# Patient Record
Sex: FEMALE | Race: WHITE | NOT HISPANIC OR LATINO | Employment: FULL TIME | URBAN - METROPOLITAN AREA
[De-identification: names, ages, dates, MRNs, and addresses within clinical notes are randomized per-mention and may not be internally consistent; named-entity substitution may affect disease eponyms.]

---

## 2017-03-02 ENCOUNTER — ALLSCRIPTS OFFICE VISIT (OUTPATIENT)
Dept: OTHER | Facility: OTHER | Age: 62
End: 2017-03-02

## 2018-01-15 VITALS
RESPIRATION RATE: 18 BRPM | HEIGHT: 64 IN | OXYGEN SATURATION: 96 % | SYSTOLIC BLOOD PRESSURE: 170 MMHG | WEIGHT: 287.25 LBS | BODY MASS INDEX: 49.04 KG/M2 | HEART RATE: 121 BPM | DIASTOLIC BLOOD PRESSURE: 90 MMHG

## 2018-03-12 ENCOUNTER — OFFICE VISIT (OUTPATIENT)
Dept: BARIATRICS | Facility: CLINIC | Age: 63
End: 2018-03-12

## 2018-03-12 VITALS
DIASTOLIC BLOOD PRESSURE: 82 MMHG | RESPIRATION RATE: 20 BRPM | WEIGHT: 291 LBS | HEART RATE: 82 BPM | SYSTOLIC BLOOD PRESSURE: 128 MMHG | TEMPERATURE: 98 F | HEIGHT: 64 IN | BODY MASS INDEX: 49.68 KG/M2

## 2018-03-12 DIAGNOSIS — E66.01 MORBID (SEVERE) OBESITY DUE TO EXCESS CALORIES (HCC): Primary | ICD-10-CM

## 2018-03-12 DIAGNOSIS — E66.01 MORBID OBESITY (HCC): Primary | ICD-10-CM

## 2018-03-12 PROCEDURE — RECHECK

## 2018-03-12 RX ORDER — LOSARTAN POTASSIUM 100 MG/1
1 TABLET ORAL DAILY
COMMUNITY
Start: 2014-01-20 | End: 2018-07-16 | Stop reason: SDUPTHER

## 2018-03-12 RX ORDER — BENZONATATE 200 MG/1
1 CAPSULE ORAL 3 TIMES DAILY PRN
COMMUNITY
Start: 2017-03-06 | End: 2018-04-27 | Stop reason: ALTCHOICE

## 2018-03-12 RX ORDER — LEVOCETIRIZINE DIHYDROCHLORIDE 5 MG/1
1 TABLET, FILM COATED ORAL DAILY
COMMUNITY
Start: 2014-01-20 | End: 2018-04-27 | Stop reason: SDUPTHER

## 2018-03-12 RX ORDER — ASPIRIN 81 MG/1
81 TABLET, CHEWABLE ORAL DAILY
COMMUNITY
End: 2018-08-09

## 2018-03-12 RX ORDER — IPRATROPIUM BROMIDE AND ALBUTEROL SULFATE 2.5; .5 MG/3ML; MG/3ML
3 SOLUTION RESPIRATORY (INHALATION) EVERY 6 HOURS PRN
COMMUNITY
Start: 2015-04-02

## 2018-03-12 RX ORDER — MELOXICAM 15 MG/1
1 TABLET ORAL DAILY
COMMUNITY
Start: 2013-11-20 | End: 2018-04-09 | Stop reason: ALTCHOICE

## 2018-03-12 RX ORDER — ALBUTEROL SULFATE 90 UG/1
2 AEROSOL, METERED RESPIRATORY (INHALATION) EVERY 6 HOURS PRN
COMMUNITY
Start: 2014-03-10

## 2018-03-12 RX ORDER — LEVOFLOXACIN 750 MG/1
1 TABLET ORAL DAILY
COMMUNITY
Start: 2016-10-24 | End: 2018-04-09 | Stop reason: ALTCHOICE

## 2018-03-12 RX ORDER — LORAZEPAM 0.5 MG/1
TABLET ORAL AS NEEDED
COMMUNITY
Start: 2014-04-08 | End: 2018-07-31 | Stop reason: SDUPTHER

## 2018-03-12 RX ORDER — HYDROCODONE POLISTIREX AND CHLORPHENIRAMINE POLISTIREX 10; 8 MG/5ML; MG/5ML
5 SUSPENSION, EXTENDED RELEASE ORAL
COMMUNITY
Start: 2016-10-24 | End: 2018-04-09 | Stop reason: ALTCHOICE

## 2018-03-12 NOTE — PROGRESS NOTES
Bariatric Behavioral Health Evaluation    Presenting Problem:  Bruno Engel, :  1955    Patient here to increase health, increase mobility, reduce chronic pain    Is the patient seeking Bariatric Surgery Eval? Yes  If yes how long have you researched this surgery option  Patient has been researching bariatric surgery for 2 years    Realizes Post- Op Requirements? Yes Patient has numerous family members with bariatric surgery    Pre-morbid level of function and history of present illness:  History of chronic pain    Psychiatric/Psychological Treatment Diagnosis: Denies [place] I diagnosis    Outpatient Counselor No    Psychiatrist No     Have you had Inpatient Treatment?  No    Family Constellation (include relationship with each and Psych/Med HX)    Father  tobacco use and Siblings  obesity    Domestic Violence No     Patient denies childhood trauma    Additional comments/stressors related to family/relationships/peer support:  Health, weight, chronic pain    Physical/Psychological Assessment:     Appearance: appropriate  Sociability: average  Affect: appropriate  Mood: calm  Thought Process: coherent  Speech: normal  Content: no impairment  Orientation: person  Yes , place  Yes , time  Yes , normal attention span  Yes , normal memory  Yes   and normal judgement  Yes   Insight: emotional  good    Risk Assessment:     none    Recommendations: Recommended for surgery  yes    Risk of Harm to Self or Others:   Patient denies suicidal and homicidal ideation    Observation:     Interviews this interview    Access to weapons no     Based on the previous information, the client presents the following risk of harm to self or others: low      BARIATRIC SURGERY EDUCATION CHECKLIST    I have received education related to my bariatric surgery process and understand:    Patients may be required to complete a psychiatric evaluation and receive clearance for surgery from their psychiatrist     Patients who undergo weight loss surgery are at higher risk of increased mental health concerns and suicide attempts  Patients may be required to complete a full substance abuse evaluation and then complete all treatment recommendations prior to surgery  If diagnosis of abuse/dependence results, patient may be required to remain sober for one (1) year before having bariatric surgery  Patients on psychiatric medications should check with their provider to discuss psychiatric medications and the changes in absorption  Patient should discuss all time release medications with provider and take all medications as prescribed  The recommendation is that there is no use of  any tobacco products, Hookah or  vapes for the bariatric post-operation patient  Bariatric surgery patients should not consume alcohol as a post-operative patient as it may increase risk of numerous health conditions including but not limited to alcohol abuse and ulcers  There is a possibility of weight regain if patient does not follow all program guidelines and recommendations  Bariatric surgery patients should exercise thirty (30) to sixty (60) minutes per day to maintain post-surgical weight loss  Research indicates that bariatric patients are more successful when they see a therapist for up to two (2) years post-op  Patients will follow all medical and dietary recommendations provided  Patient will keep all scheduled appointments and follow up with their physician for a minimum of five (5) years  Patient will take all vitamins as recommended  Post-operative vitamins are life-long  Patient reviewed Bariatric Surgery Education Checklist and agrees they have received education on these issues   Note:  Patient denies Ax[place] I diagnosis or treatment  Patient educated regarding the impact of nicotine and alcohol on the post bariatric surgery patient    Patient meets criteria for surgery at this program and is referred to physician

## 2018-03-12 NOTE — PROGRESS NOTES
Bariatric Nutrition Assessment Note    Type of surgery    Gastric bypass: laparoscopic  Surgery Date: TDB  Surgeon: Dr Jose Luis Arroyo  65 y o   female     Wt with BMI of 25: 143lbs  Pre-Op Excess Wt: 148  Height 5' 3 5" (1 613 m), weight 132 kg (291 lb)  Weight History   Onset of Obesity: Childhood, teenage years  Family history of obesity: Yes, sister had lap band, now going for bariatric sx  Wt Loss Attempts: Commercial Programs ([employer], Mayela Katt, etc )  Exercise  FAD Diets (Cabbage soup, Grapefruit, Cleanse, etc )  Fasting  High Protein/Low CHO diets (Atkins, Union, etc )  Self Created Diets (Portion Control, Healthy Food Choices, etc )  Maximum Wt Lost: 55lbs w/ weight watchers    Review of History and Medications   No past medical history on file  No past surgical history on file  Social History     Social History    Marital status: /Civil Union     Spouse name: N/A    Number of children: N/A    Years of education: N/A     Social History Main Topics    Smoking status: Not on file    Smokeless tobacco: Not on file    Alcohol use Not on file    Drug use: Unknown    Sexual activity: Not on file     Other Topics Concern    Not on file     Social History Narrative    No narrative on file     No current outpatient prescriptions on file  Food Intake and Lifestyle Assessment   Food Intake Assessment completed via usual diet recall  Breakfast: coffee w/lf milk and belvita pack  Sometimes oatmeal  Snack: grazes   Lunch: sometimes will bring a yoselyn, chicken salad or turkey and cheese sandwich  Or will get a salad from [facility] with tuna, cucumbers and oil and vinegar    Snack: grazes  Dinner: usually meat, veggie and carb, but sometimes orders out pizza or [employer]  Snack: popcorn  Beverage intake: water and coffee/tea  Protein supplement: n/a  Estimated protein intake per day: 60gm  Estimated fluid intake per day: 48-64oz  Meals eaten away from home: 1-2 per week  Typical meal pattern: 3 meals per day and 0-3 snacks per day  Eating Behaviors: Consumption of high calorie/ high fat foods, Large portion sizes, Frequent snacking/ grazing, Mindless eating, Emotional eating and Craves salty foods  Food allergies or intolerances: Allergies not on file  Cultural or Mandaeism considerations: n/a    Physical Assessment  Physical Activity  Types of exercise: None, had bad arthritis in knees, therefore has difficulty walking  Discussed trying the recumbent bike which she has done in the past  Current physical limitations: bad arthritis in both knees    Psychosocial Assessment   Support systems: spouse spouse and children sibling(s) children  Socioeconomic factors: n/a    Nutrition Diagnosis  Diagnosis: Overweight / Obesity (NC-3 3), Excessive energy intake (NI-1 5), Inappropriate intake of carbohydrates (NI-5 8 3) and Inadequate protein intake (NI-5 7  3)  Related to: Physical inactivity and Excessive energy intake  As Evidenced by: BMI >25 and Excessive energy intake     Nutrition Prescription: Recommend the following diet  High protein and Regular    Interventions and Teaching   Discussed pre-op and post-op nutrition guidelines  Patient educated and handouts provided    Surgical changes to stomach / GI  Capacity of post-surgery stomach  Adequate hydration  Sugar and fat restriction to decrease "dumping syndrome"  Expected weight loss  Exercise  Suggestions for pre-op diet  Nutrition considerations after surgery  Protein supplements  Meal planning and preparation  Appropriate carbohydrate, protein, and fat intake, and food/fluid choices to maximize safe weight loss, nutrient intake, and tolerance   Dietary and lifestyle changes  Possible problems with poor eating habits  Techniques for self monitoring and keeping daily food journal  Vitamin / Mineral supplementation 2000IU Vitamin D at this time, advised to add MVI    Education provided to: patient    Barriers to learning: No barriers identified  Readiness to change: preparation    Prior research on procedure: books, internet, discussed with provider and info session    Comprehension: verbalizes understanding     Expected Compliance: excellent  Recommendations  Pt is an appropriate candidate for surgery  Yes    Discussed with pt the need to lose 5% of current weight before surgery (14 5lbs)  Reviewed changes to make at this time to assist in weight loss ie:  Increase intake of veggies and protein, decrease intake of carbs and fat  Start back with exercise on the recumbent bike    Evaluation / Monitoring  Dietitian to Monitor: Eating pattern as discussed Body weight Physical activity    Goals  Food journal, Exercise 15 minutes *(to start due to knees) 5 times per week, Complete lession plans 1-6, Eat 3 meals per day and Eliminate mindless snacking    Time Spent:   1 Hour

## 2018-04-05 PROBLEM — F41.8 DEPRESSION WITH ANXIETY: Status: ACTIVE | Noted: 2018-04-05

## 2018-04-05 PROBLEM — E66.01 MORBID OBESITY WITH BODY MASS INDEX (BMI) OF 50.0 TO 59.9 IN ADULT (HCC): Status: ACTIVE | Noted: 2018-04-05

## 2018-04-05 PROBLEM — I10 BENIGN ESSENTIAL HTN: Status: ACTIVE | Noted: 2018-04-05

## 2018-04-05 NOTE — ASSESSMENT & PLAN NOTE
Interested in [address] with Dr Shyanne Clemons    10% Weight loss-Not Applicable  Screening labs-Not Completed  Support Group-Not Completed  Cardiac Risk Assessment-Not Completed  Upper Endoscopy-Not Completed  Sleep Medicine Assessment-Negative STOP  BANG  Alcohol and nicotine use is not recommended following bariatric surgery  NSAIDs should be avoided following bariatric surgery  Advised that pregnancy should be avoided following bariatric surgery for 12-18 months and should not solely rely on OCP and consider additional/alternative sources for contraception due to potential absorption issues-post menopausal

## 2018-04-05 NOTE — PROGRESS NOTES
Assessment/Plan: Morbid obesity with body mass index (BMI) of 50 0 to 59 9 in [facility])  Interested in Margarita-En-Y Gastric Bypass with Dr Giacomo Maloney  10% Weight loss-Not Applicable  Screening labs-Not Completed  Support Group-Not Completed  Cardiac Risk Assessment-Not Completed  Upper Endoscopy-Not Completed  Sleep Medicine Assessment-Negative STOP  BANG  Alcohol and nicotine use is not recommended following bariatric surgery  NSAIDs should be avoided following bariatric surgery  Advised that pregnancy should be avoided following bariatric surgery for 12-18 months and should not solely rely on OCP and consider additional/alternative sources for contraception due to potential absorption issues-post menopausal    Benign essential HTN  -stable  -taking losartan    Depression with anxiety  -on ativan prn   -recommend f/u with PCP  -no longer taking sertraline    Follow up in approximately 1 month with Non-Surgical Physician/Advanced Practitioner  Goals:  Food log (ie ) www myfitnesspal com,sparkpeople  com,loseit com,calorieking  com,etc    No sugary beverages  At least 64oz of water daily  Increase physical activity by 10 minutes daily Gradually increase physical activity to a goal of 5 days per week for 30 minutes of MODERATE intensity PLUS 2 days per week of FULL BODY resistance training  Practice lesson plans 1-6 in bariatric manual   Practice 30/60 rule  Please schedule cardiology risk assessment  Please attend support group  Please have labs drawn    Diagnoses and all orders for this visit:    Morbid obesity with body mass index (BMI) of 50 0 to 59 9 in [facility])  -     Comprehensive metabolic panel; Future  -     TSH, 3rd generation with T4 reflex; Future  -     Lipid panel; Future  -     CBC and Platelet; Future    Benign essential HTN  -     Comprehensive metabolic panel; Future  -     TSH, 3rd generation with T4 reflex; Future  -     Lipid panel; Future  -     CBC and Platelet;  Future    Depression with anxiety    Abnormal weight gain  -     Comprehensive metabolic panel; Future  -     TSH, 3rd generation with T4 reflex; Future  -     Lipid panel; Future  -     CBC and Platelet; Future    Preoperative clearance  -     Comprehensive metabolic panel; Future  -     TSH, 3rd generation with T4 reflex; Future  -     Lipid panel; Future  -     CBC and Platelet; Future    Other orders  -     Multiple Vitamins-Minerals (WOMENS MULTIVITAMIN PO); Take by mouth daily at bedtime    Subjective:   Chief Complaint   Patient presents with    Consult     Pt is here for 1/3 0667 [facility] weight consult     Patient ID: Faraz Palomares  is a 65 y o  female with excess weight/obesity here to pursue weight managment  Past Medical History:   Diagnosis Date    Anxiety     Asthma     Depression     Hypertension     Lumbar disc disease     Morbid obesity ([address] )     Osteoarthritis      HPI:  Obesity/Excess Weight:  Severity: Severe  Onset:  Most of her life    Modifiers: Commercial Weight Loss Programs-ie  Weight Watchers, Micah Sara, Nutrisystem, etc   Contributing factors: Pregnancy and falls/knee injury, poor eating habits  Associated symptoms: increased joint pain, increased shortness of breath and inability to do certain activities    The following portions of the patient's history were reviewed and updated as appropriate: allergies, current medications, past family history, past medical history, past social history, past surgical history and problem list     Review of Systems   Constitutional: Negative for chills and fever  HENT: Negative for sore throat  Respiratory: Positive for shortness of breath (associated with asthma, usually occurs with URI's)  Negative for cough  Cardiovascular: Negative for chest pain and palpitations  Gastrointestinal: Negative for abdominal pain, constipation, diarrhea, nausea and vomiting  Denies GERD   Genitourinary: Negative for dysuria  Musculoskeletal: Positive for arthralgias  Skin: Negative for rash  Neurological: Negative for headaches  Psychiatric/Behavioral:        Recently discontinued anti-depressant  Denies sx of depression  Recommend f/u with PCP     Objective:    /78 (BP Location: Left arm, Patient Position: Sitting, Cuff Size: Extra-Large)   Pulse 100   Temp 98 5 °F (36 9 °C) (Tympanic)   Resp 16   Ht 5' 3 5" (1 613 m)   Wt 130 kg (287 lb)   BMI 50 04 kg/m²     Physical Exam   Nursing note and vitals reviewed  Constitutional   General appearance: Abnormal   well developed and morbidly obese  Eyes No conjunctival pallor  Ears, Nose, Mouth, and Throat Oral mucosa moist    Pulmonary   Respiratory effort: No increased work of breathing or signs of respiratory distress  Auscultation of lungs: Clear to auscultation, equal breath sounds bilaterally, no wheezes, no rales, no rhonci  Cardiovascular   Auscultation of heart: Normal rate and rhythm, normal S1 and S2, without murmurs  Examination of extremities for edema and/or varicosities: Normal   no edema  Abdomen   Abdomen: Abnormal   The abdomen was obese  Bowel sounds were normal  The abdomen was soft and nontender     Musculoskeletal   Gait and station: ambulates with cane  Psychiatric   Orientation to person, place and time: Normal     Affect: appropriate

## 2018-04-09 ENCOUNTER — OFFICE VISIT (OUTPATIENT)
Dept: BARIATRICS | Facility: CLINIC | Age: 63
End: 2018-04-09
Payer: COMMERCIAL

## 2018-04-09 VITALS
SYSTOLIC BLOOD PRESSURE: 120 MMHG | HEIGHT: 64 IN | BODY MASS INDEX: 49 KG/M2 | RESPIRATION RATE: 16 BRPM | TEMPERATURE: 98.5 F | DIASTOLIC BLOOD PRESSURE: 78 MMHG | WEIGHT: 287 LBS | HEART RATE: 100 BPM

## 2018-04-09 DIAGNOSIS — F41.8 DEPRESSION WITH ANXIETY: ICD-10-CM

## 2018-04-09 DIAGNOSIS — Z01.818 PREOPERATIVE CLEARANCE: ICD-10-CM

## 2018-04-09 DIAGNOSIS — E66.01 MORBID OBESITY WITH BODY MASS INDEX (BMI) OF 50.0 TO 59.9 IN ADULT (HCC): Primary | ICD-10-CM

## 2018-04-09 DIAGNOSIS — I10 BENIGN ESSENTIAL HTN: ICD-10-CM

## 2018-04-09 DIAGNOSIS — R63.5 ABNORMAL WEIGHT GAIN: ICD-10-CM

## 2018-04-09 PROCEDURE — 99204 OFFICE O/P NEW MOD 45 MIN: CPT | Performed by: PHYSICIAN ASSISTANT

## 2018-04-09 NOTE — PATIENT INSTRUCTIONS
Goals: Food log (ie ) www myfitnesspal com,sparkpeople  com,loseit com,calorieking  com,etc    No sugary beverages  At least 64oz of water daily    Increase physical activity by 10 minutes daily Gradually increase physical activity to a goal of 5 days per week for 30 minutes of MODERATE intensity PLUS 2 days per week of FULL BODY resistance training  Practice lesson plans 1-6 in bariatric manual   Practice 30/60 rule  Please schedule cardiology risk assessment  Please attend support group  Please have labs drawn

## 2018-04-10 ENCOUNTER — TELEPHONE (OUTPATIENT)
Dept: FAMILY MEDICINE CLINIC | Facility: CLINIC | Age: 63
End: 2018-04-10

## 2018-04-27 ENCOUNTER — OFFICE VISIT (OUTPATIENT)
Dept: FAMILY MEDICINE CLINIC | Facility: CLINIC | Age: 63
End: 2018-04-27
Payer: COMMERCIAL

## 2018-04-27 VITALS
BODY MASS INDEX: 50.5 KG/M2 | OXYGEN SATURATION: 97 % | WEIGHT: 285 LBS | TEMPERATURE: 98.2 F | SYSTOLIC BLOOD PRESSURE: 160 MMHG | HEART RATE: 93 BPM | HEIGHT: 63 IN | DIASTOLIC BLOOD PRESSURE: 90 MMHG

## 2018-04-27 DIAGNOSIS — E66.01 CLASS 3 SEVERE OBESITY DUE TO EXCESS CALORIES WITHOUT SERIOUS COMORBIDITY WITH BODY MASS INDEX (BMI) OF 40.0 TO 44.9 IN ADULT (HCC): Primary | ICD-10-CM

## 2018-04-27 DIAGNOSIS — T78.40XD ALLERGIC STATE, SUBSEQUENT ENCOUNTER: ICD-10-CM

## 2018-04-27 PROCEDURE — 99213 OFFICE O/P EST LOW 20 MIN: CPT | Performed by: FAMILY MEDICINE

## 2018-04-27 RX ORDER — MULTIVIT-MIN/IRON/FOLIC ACID/K 18-600-40
1 CAPSULE ORAL EVERY EVENING
COMMUNITY

## 2018-04-27 RX ORDER — LEVOCETIRIZINE DIHYDROCHLORIDE 5 MG/1
5 TABLET, FILM COATED ORAL DAILY
Qty: 90 TABLET | Refills: 3 | Status: SHIPPED | OUTPATIENT
Start: 2018-04-27 | End: 2019-07-23 | Stop reason: SDUPTHER

## 2018-05-01 NOTE — PROGRESS NOTES
Assessment/Plan:    No problem-specific Assessment & Plan notes found for this encounter  Diagnoses and all orders for this visit:    Class 3 severe obesity due to excess calories without serious comorbidity with body mass index (BMI) of 40 0 to 44 9 in adult [facility])  -     Ambulatory referral to Bariatric Surgery; Future    Allergic state, subsequent encounter  -     levocetirizine (XYZAL) 5 MG tablet; Take 1 tablet (5 mg total) by mouth daily    Other orders  -     Cholecalciferol (VITAMIN D) 2000 units CAPS; Take 1 capsule by mouth daily        I definitely feel she would benefit from weight loss surgery in terms of her asthma and her arthritic condition of her knees  As well as her hypertension  Subjective:      Patient ID: Eleno Closs is a 65 y o  female  Lucia Tamez is here to discuss her interest in weight loss surgery she already has gone through the education portion of things with Dr Mecca Tony she is unsure if she wants to sleep versus the Margarita-en-Y did have a long discussion regarding my experiences with patients who have had both  She went off her sertraline on her own with weaning and she feels she is doing fine        The following portions of the patient's history were reviewed and updated as appropriate: current medications, past social history, past surgical history and problem list     Review of Systems   Constitutional: Negative  HENT: Negative  Respiratory: Negative  Cardiovascular: Negative  Gastrointestinal: Negative  Objective:      /90   Pulse 93   Temp 98 2 °F (36 8 °C) (Tympanic)   Ht 5' 3" (1 6 m)   Wt 129 kg (285 lb)   SpO2 97%   BMI 50 49 kg/m²          Physical Exam   Constitutional: She appears well-developed  Morbidly obese   Psychiatric: She has a normal mood and affect   Her behavior is normal  Judgment and thought content normal

## 2018-05-02 ENCOUNTER — OFFICE VISIT (OUTPATIENT)
Dept: CARDIOLOGY CLINIC | Facility: CLINIC | Age: 63
End: 2018-05-02
Payer: COMMERCIAL

## 2018-05-02 VITALS
OXYGEN SATURATION: 98 % | WEIGHT: 288 LBS | HEIGHT: 63 IN | BODY MASS INDEX: 51.03 KG/M2 | DIASTOLIC BLOOD PRESSURE: 96 MMHG | HEART RATE: 90 BPM | SYSTOLIC BLOOD PRESSURE: 136 MMHG

## 2018-05-02 DIAGNOSIS — Z82.49 FAMILY HISTORY OF HEART DISEASE: ICD-10-CM

## 2018-05-02 DIAGNOSIS — I10 BENIGN ESSENTIAL HYPERTENSION: ICD-10-CM

## 2018-05-02 DIAGNOSIS — Z01.818 PRE-OP EXAM: Primary | ICD-10-CM

## 2018-05-02 DIAGNOSIS — E66.01 MORBID OBESITY WITH BODY MASS INDEX (BMI) OF 50.0 TO 59.9 IN ADULT (HCC): ICD-10-CM

## 2018-05-02 DIAGNOSIS — R06.00 DYSPNEA ON EXERTION: ICD-10-CM

## 2018-05-02 PROCEDURE — 99244 OFF/OP CNSLTJ NEW/EST MOD 40: CPT | Performed by: INTERNAL MEDICINE

## 2018-05-02 PROCEDURE — 93000 ELECTROCARDIOGRAM COMPLETE: CPT | Performed by: INTERNAL MEDICINE

## 2018-05-02 NOTE — PROGRESS NOTES
Subjective:      Rodolfo Medrano is a 65 y o  female who presents to the office today for a preoperative consultation at the request of surgeon Dr Emmanuel Soliman who plans on performing gastric bypass (VS sleeve) in July  This consultation is requested for the specific conditions prompting preoperative evaluation (i e  because of potential affect on operative risk): poor conditioning, dyspnea, hypertension  Planned anesthesia is general  The patient has no known anesthesia issues and no remote history of abnormal bleeding  She feels dyspnea on exertion and is restricted in activity due to knee pain  She cannot walk 4 blocks or 2 flights of stairs without stopping  The following portions of the patient's history were reviewed and updated as appropriate: allergies, current medications, past family history, past medical history, past social history, past surgical history and problem list     Review of Systems  Pertinent items are noted in HPI  Remainder reviewed and are negative  Objective:      Physical Exam  /96 (BP Location: Left arm, Patient Position: Sitting, Cuff Size: Large)   Pulse 90 Comment: apical  Ht 5' 3" (1 6 m)   Wt 131 kg (288 lb)   SpO2 98%   BMI 51 02 kg/m²   General appearance: alert and oriented, in no acute distress  Head: Normocephalic, without obvious abnormality, atraumatic  Eyes: conjunctivae/corneas clear  PERRL, EOM's intact  Fundi benign  Nose: Nares normal  Septum midline  Mucosa normal  No drainage or sinus tenderness    Neck: no adenopathy, no carotid bruit, no JVD, supple, symmetrical, trachea midline and thyroid not enlarged, symmetric, no tenderness/mass/nodules  Lungs: clear to auscultation bilaterally  Heart: regular rate and rhythm, S1, S2 normal, no murmur, click, rub or gallop  Abdomen: soft, non-tender; bowel sounds normal; no masses,  no organomegaly  Extremities: extremities normal, warm and well-perfused; no cyanosis, clubbing, or edema  Skin: Skin color, texture, turgor normal  No rashes or lesions    Predictors of intubation difficulty:   Morbid obesity? yes    Anatomically abnormal facies? no   Prominent incisors? no   Receding mandible? no   Short, thick neck? yes    Neck range of motion: normal    Cardiographics  ECG: normal sinus rhythm and poor R-wave progression    Lab Review   No visits with results within 2 Month(s) from this visit  Assessment:     1  Pre-op exam - patient's functional capacity is limited due to knee pain  She has poor exercise capacity and develops dyspnea  Will need additional testing prior to surgery to rule out CAD or other cardiac dysfunction  2  Benign essential hypertension - controlled on losartan   3  Family history of heart disease    4  Morbid obesity with body mass index (BMI) of 50 0 to 59 9 in adult ([facility] [address] )    5  Dyspnea on exertion            Plan:      1  Preoperative workup as follows cardiac stress testing to exclude undiagnosed coronary disease (testing will be done with pharmacological agent as she cannot walk on treadmill due to knee pain and limp), echocardiography to exclude impaired ventricular function, hemoglobin, hematocrit, electrolytes, creatinine  2  Change in medication regimen before surgery: none  3  Prophylaxis for cardiac events with perioperative beta-blockers: not indicated  4  Invasive hemodynamic monitoring perioperatively: not indicated    5  Deep vein thrombosis prophylaxis postoperatively:regimen to be chosen by surgical team

## 2018-05-09 ENCOUNTER — TELEPHONE (OUTPATIENT)
Dept: FAMILY MEDICINE CLINIC | Facility: CLINIC | Age: 63
End: 2018-05-09

## 2018-05-10 ENCOUNTER — TELEPHONE (OUTPATIENT)
Dept: FAMILY MEDICINE CLINIC | Facility: CLINIC | Age: 63
End: 2018-05-10

## 2018-05-11 ENCOUNTER — OFFICE VISIT (OUTPATIENT)
Dept: BARIATRICS | Facility: CLINIC | Age: 63
End: 2018-05-11
Payer: COMMERCIAL

## 2018-05-11 VITALS
BODY MASS INDEX: 48.49 KG/M2 | DIASTOLIC BLOOD PRESSURE: 68 MMHG | WEIGHT: 284 LBS | RESPIRATION RATE: 18 BRPM | SYSTOLIC BLOOD PRESSURE: 116 MMHG | HEIGHT: 64 IN | TEMPERATURE: 98 F | HEART RATE: 88 BPM

## 2018-05-11 DIAGNOSIS — E66.9 OBESITY, CLASS I, BMI 30-34.9: Primary | ICD-10-CM

## 2018-05-11 PROBLEM — E66.01 MORBID OBESITY (HCC): Status: ACTIVE | Noted: 2018-05-11

## 2018-05-11 PROCEDURE — 99204 OFFICE O/P NEW MOD 45 MIN: CPT | Performed by: SURGERY

## 2018-05-11 NOTE — PROGRESS NOTES
BARIATRIC CONSULT-INITIAL - BARIATRIC SURGERY  Naz Carter 65 y o  female MRN: [id number]  Unit/Bed#:  Encounter: [id number]      HPI:  Naz Carter is a 65 y o  female who presents with morbid obesity to discuss weight loss options  Review of Systems   Constitutional: Negative  HENT: Negative  Eyes: Negative  Respiratory: Negative  Cardiovascular: Negative  Gastrointestinal: Negative  Endocrine: Negative  Genitourinary: Negative  Musculoskeletal: Negative  Skin: Negative  Neurological: Negative  Hematological: Negative  Psychiatric/Behavioral: Negative  Historical Information   Past Medical History:   Diagnosis Date    Allergic rhinitis     last assessed 14, resolved 12/22/15    Anxiety     Arthritis     resolved 12/22/15    Asthma     Depression     Hypertension     Benign essential     Lumbar disc disease     Morbid obesity ([address] )     Osteoarthritis     Pre-operative laboratory examination      Past Surgical History:   Procedure Laterality Date     SECTION      last assessed 14     Social History   History   Alcohol Use    Yes     Comment: wine - rarely     History   Drug Use No     History   Smoking Status    Never Smoker   Smokeless Tobacco    Never Used     Family History: non-contributory    Meds/Allergies   all medications and allergies reviewed  No Known Allergies    Objective       Current Vitals:   Blood Pressure: 116/68 (18 1418)  Pulse: 88 (18 1418)  Temperature: 98 °F (36 7 °C) (18 1418)  Respirations: 18 (18 1418)  Height: 5' 3 5" (161 3 cm) (18 1418)  Weight - Scale: 129 kg (284 lb) (18 1418)  Body mass index is 49 52 kg/m²  Invasive Devices          No matching active lines, drains, or airways          Physical Exam   Constitutional: She appears well-developed and well-nourished  Lab Results: I have personally reviewed pertinent lab results      Imaging: I have personally reviewed pertinent reports  EKG, Pathology, and Other Studies: I have personally reviewed pertinent reports  Code Status: [unfilled]  Advance Directive and Living Will:      Power of :    POLST:      Assessment/PLAN:            Patient has a long history of morbid obesity and is presenting to discuss the surgical weight loss options  Despite the patient best efforts patient was unable to lose any meaningful or sustainable weight using nonsurgical means  We had a long discussion regarding all the surgical weight-loss options at our disposal at this point and reviewed the risks and benefits of each procedure in details as it relates to her age, BMI and medical conditions  Patient elected to undergo a gastric bypass    Risks and benefits were explained to the patient  We also discussed the importance and need of a preoperative workup to make sure that the patient can undergo the procedure safely  Preoperative workup includes sleep apnea screening, cardiac evaluation, nutrition/psych and preoperative EGD  Risks and benefits of all the preoperative diagnostic tests were discussed with the patient including but not limited to the upper endoscopy  Alternatives to surgery and alternative forms of surgery were also explained  Postsurgical commitment and aftercare programs were discussed and explained to the patient in details   In terms of comorbidities patient suffers mostly of   Past Medical History:   Diagnosis Date    Allergic rhinitis     last assessed 1/20/14, resolved 12/22/15    Anxiety     Arthritis     resolved 12/22/15    Asthma     Depression     Hypertension     Benign essential     Lumbar disc disease     Morbid obesity [facility] )     Osteoarthritis     Pre-operative laboratory examination        I informed the patient that the rate of resolution of comorbid conditions following weight loss surgery is between 60 and 90% depending on the severity of the specific medical condition  I discussed and educated the patient regarding the different components of our multidisciplinary program and the importance of compliance and follow-up in the postoperative period  All questions answered  Patient understands risks and benefits  A videotape of the procedure was also shown to the patient  After showing the video we discussed all the technical aspects of the procedure and also the potential complications including but not limited to gastrointestinal perforation, leak, obstruction, stricture and hemorrhage  I spent 45 min with the patient more than 50% of the time was spent educating the patient and coordinating care

## 2018-05-11 NOTE — LETTER
May 11, 2018     Ermelinda Tillman, [address]    Patient: Tra Templeton   YOB: 1955   Date of Visit: 5/11/2018       Dear Dr Joshua Navarro: Thank you for referring Tra Templeton to me for evaluation  Below are my notes for this consultation  If you have questions, please do not hesitate to call me  I look forward to following your patient along with you           Sincerely,        Humberto Izaguirre MD        CC: No Recipients

## 2018-05-30 ENCOUNTER — HOSPITAL ENCOUNTER (OUTPATIENT)
Dept: RADIOLOGY | Facility: HOSPITAL | Age: 63
Discharge: HOME | End: 2018-05-30
Attending: INTERNAL MEDICINE
Payer: COMMERCIAL

## 2018-05-30 ENCOUNTER — HOSPITAL ENCOUNTER (OUTPATIENT)
Dept: NON INVASIVE DIAGNOSTICS | Facility: HOSPITAL | Age: 63
Discharge: HOME | End: 2018-05-30
Attending: INTERNAL MEDICINE
Payer: COMMERCIAL

## 2018-05-30 DIAGNOSIS — Z01.818 PRE-OP EXAM: ICD-10-CM

## 2018-05-30 DIAGNOSIS — R06.00 DYSPNEA ON EXERTION: ICD-10-CM

## 2018-05-30 LAB
CHEST PAIN STATEMENT: NORMAL
MAX DIASTOLIC BP: 85 MMHG
MAX HEART RATE: 155 BPM
MAX PREDICTED HEART RATE: 159 BPM
MAX. SYSTOLIC BP: 171 MMHG
PROTOCOL NAME: NORMAL
REASON FOR TERMINATION: NORMAL
TARGET HR FORMULA: NORMAL
TIME IN EXERCISE PHASE: NORMAL

## 2018-05-30 PROCEDURE — 78452 HT MUSCLE IMAGE SPECT MULT: CPT | Performed by: INTERNAL MEDICINE

## 2018-05-30 PROCEDURE — 93016 CV STRESS TEST SUPVJ ONLY: CPT | Performed by: INTERNAL MEDICINE

## 2018-05-30 PROCEDURE — 93306 TTE W/DOPPLER COMPLETE: CPT

## 2018-05-30 PROCEDURE — A9502 TC99M TETROFOSMIN: HCPCS

## 2018-05-30 PROCEDURE — 93017 CV STRESS TEST TRACING ONLY: CPT

## 2018-05-30 PROCEDURE — 93018 CV STRESS TEST I&R ONLY: CPT | Performed by: INTERNAL MEDICINE

## 2018-05-30 PROCEDURE — 78452 HT MUSCLE IMAGE SPECT MULT: CPT

## 2018-05-30 RX ADMIN — REGADENOSON 0.4 MG: 0.08 INJECTION, SOLUTION INTRAVENOUS at 09:15

## 2018-06-11 ENCOUNTER — TELEPHONE (OUTPATIENT)
Dept: CARDIOLOGY CLINIC | Facility: CLINIC | Age: 63
End: 2018-06-11

## 2018-06-11 ENCOUNTER — OFFICE VISIT (OUTPATIENT)
Dept: BARIATRICS | Facility: CLINIC | Age: 63
End: 2018-06-11
Payer: COMMERCIAL

## 2018-06-11 VITALS
RESPIRATION RATE: 17 BRPM | BODY MASS INDEX: 48.36 KG/M2 | WEIGHT: 283.3 LBS | HEIGHT: 64 IN | HEART RATE: 91 BPM | SYSTOLIC BLOOD PRESSURE: 128 MMHG | DIASTOLIC BLOOD PRESSURE: 86 MMHG | TEMPERATURE: 98.6 F

## 2018-06-11 DIAGNOSIS — I10 BENIGN ESSENTIAL HYPERTENSION: ICD-10-CM

## 2018-06-11 DIAGNOSIS — E66.01 MORBID OBESITY WITH BODY MASS INDEX (BMI) OF 40.0 TO 49.9 (HCC): Primary | ICD-10-CM

## 2018-06-11 PROCEDURE — 99214 OFFICE O/P EST MOD 30 MIN: CPT | Performed by: PHYSICIAN ASSISTANT

## 2018-06-11 NOTE — PROGRESS NOTES
Assessment/Plan: Morbid obesity with body mass index (BMI) of 40 0 to 49 9 ([facility] [address] )  Interested in Margarita-En-Y Gastric Bypass with Dr Lorena Parks  10% Weight loss-Not Applicable  Screening labs-Not Completed  Check coverage of vitamin labs before having them drawn  Support Group- Completed 4/18/18  Cardiac Risk Assessment-Not Completed  Upper Endoscopy-Not Completed; scheduled for 6/13/18  Sleep Medicine Assessment-Negative STOP  BANG  Alcohol and nicotine use is not recommended following bariatric surgery  NSAIDs should be avoided following bariatric surgery  Advised that pregnancy should be avoided following bariatric surgery for 12-18 months and should not solely rely on OCP and consider additional/alternative sources for contraception due to potential absorption issues-post menopausal    Benign essential hypertension  -stable  -taking losartan    Follow up in approximately 1 month with Surgical Dietician  Reviewed the importance of following the lessons in the manual and the dietary, behavioral, and exercise changes for long-term success following bariatric surgery  Goals:  Food log (ie ) www myfitnesspal com,sparkpeople  com,loseit com,calorieking  com,etc    No sugary beverages  At least 64oz of water daily  Increase physical activity by 10 minutes daily Gradually increase physical activity to a goal of 5 days per week for 30 minutes of MODERATE intensity PLUS 2 days per week of FULL BODY resistance training  Practice lesson plans 1-6 in bariatric manual   Practice 30/60 rule  Please have labs done     Diagnoses and all orders for this visit:    Morbid obesity with body mass index (BMI) of 40 0 to 49 9 ([facility])    Benign essential hypertension    Subjective:   Chief Complaint   Patient presents with    Weight Check     Patient here for 3/3 Jimbo-Weight Check  Patient ID: Julio C Dueñas  is a 65 y o  female with excess weight/obesity here to pursue weight managment    Patient is pursuing Margarita-En-Y Gastric Bypass  HPI  The patient presents for 3/4 weight check  The patient has been:  Following the lessons in the manual- yes  Practicing the 30/60 minute rule- yes  Food logging- yes  Exercise- no    The following portions of the patient's history were reviewed and updated as appropriate: allergies, current medications, past family history, past medical history, past social history, past surgical history and problem list     Review of Systems   Respiratory: Negative for cough and shortness of breath  Cardiovascular: Negative for chest pain and palpitations  Gastrointestinal: Negative for abdominal pain, constipation, diarrhea, nausea and vomiting  Psychiatric/Behavioral:        Denies sx of depression     Objective:    /86 (BP Location: Left arm, Patient Position: Sitting, Cuff Size: Large)   Pulse 91   Temp 98 6 °F (37 °C) (Tympanic)   Resp 17   Ht 5' 3 5" (1 613 m)   Wt 129 kg (283 lb 4 8 oz)   BMI 49 40 kg/m²      Physical Exam   Nursing note and vitals reviewed  Constitutional   General appearance: Abnormal   well developed and morbidly obese  Eyes No conjunctival pallor  Ears, Nose, Mouth, and Throat Oral mucosa moist    Pulmonary   Respiratory effort: No increased work of breathing or signs of respiratory distress  Auscultation of lungs: Clear to auscultation, equal breath sounds bilaterally, no wheezes, no rales, no rhonci  Cardiovascular   Auscultation of heart: Borderline tachycardic, Normal rhythm, normal S1 and S2, without murmurs  Examination of extremities for edema and/or varicosities: + nonpitting edema  Abdomen   Abdomen: Abnormal   The abdomen was obese  Bowel sounds were normal  The abdomen was soft and nontender     Musculoskeletal   Gait and station: ambulates with cane   Psychiatric   Orientation to person, place and time: Normal     Affect: appropriate

## 2018-06-11 NOTE — ASSESSMENT & PLAN NOTE
Interested in [address] with Dr Jesse Choudhury  10% Weight loss-Not Applicable  Screening labs-Not Completed  Check coverage of vitamin labs before having them drawn    Support Group- Completed 4/18/18  Cardiac Risk Assessment-Not Completed  Upper Endoscopy-Not Completed; scheduled for 6/13/18  Sleep Medicine Assessment-Negative STOP  BANG  Alcohol and nicotine use is not recommended following bariatric surgery  NSAIDs should be avoided following bariatric surgery  Advised that pregnancy should be avoided following bariatric surgery for 12-18 months and should not solely rely on OCP and consider additional/alternative sources for contraception due to potential absorption issues-post menopausal

## 2018-06-11 NOTE — PATIENT INSTRUCTIONS
Goals: Food log (ie ) www myfitnesspal com,sparkpeople  com,loseit com,calorieking  com,etc    No sugary beverages  At least 64oz of water daily    Increase physical activity by 10 minutes daily Gradually increase physical activity to a goal of 5 days per week for 30 minutes of MODERATE intensity PLUS 2 days per week of FULL BODY resistance training  Practice lesson plans 1-6 in bariatric manual   Practice 30/60 rule  Please have labs done

## 2018-06-12 NOTE — TELEPHONE ENCOUNTER
Pre  Op  Clearance note- Cardiology    Josefina Rodney   65 y o   female  1955      DO Josefina Ceballos :   Patient's chart was reviewed for preop clearance  Patient was seen in our office on 5/2/18  Patient has past medical history significant for obesity and hypertension  Patient is now scheduled for gastric bypass  Patient has no clinical evidence of heart failure or ischemia or active arrhythmia  Patient's last cardiac workup including stress test and echocardiogram reports were reviewed and it shows normal cardiac function  In my opinion patient is in optimum condition for the procedure as planned  Patient is low risk for the surgery as planned  Continue current cardiac medications  If you have any question please do not hesitate to call us at our office of [facility]    Phone # [phone]      Erika Rosas DO, [facility] [place]  6/12/2018  9:19 AM

## 2018-06-18 ENCOUNTER — TELEPHONE (OUTPATIENT)
Dept: CARDIOLOGY CLINIC | Facility: CLINIC | Age: 63
End: 2018-06-18

## 2018-06-18 NOTE — TELEPHONE ENCOUNTER
----- Message from Maksim Luciano sent at 6/18/2018 12:03 PM EDT -----  Regarding: FW: CARDIAC CLEARANCE      ----- Message -----  From: Kennedi France  Sent: 6/15/2018   1:05 PM  To: Maksim Luciano  Subject: CARDIAC CLEARANCE                                Larry Rangel Yuri,  Is it possible to put the clearance statement in the last cardiac visit this will be required by her insurance company to see in a report for preauthorization for surgery   Thank you

## 2018-06-27 ENCOUNTER — ANESTHESIA EVENT (OUTPATIENT)
Dept: GASTROENTEROLOGY | Facility: HOSPITAL | Age: 63
End: 2018-06-27
Payer: COMMERCIAL

## 2018-06-28 ENCOUNTER — TELEPHONE (OUTPATIENT)
Dept: BARIATRICS | Facility: CLINIC | Age: 63
End: 2018-06-28

## 2018-06-28 ENCOUNTER — HOSPITAL ENCOUNTER (OUTPATIENT)
Facility: HOSPITAL | Age: 63
Setting detail: OUTPATIENT SURGERY
Discharge: HOME | End: 2018-06-28
Attending: SURGERY | Admitting: SURGERY
Payer: COMMERCIAL

## 2018-06-28 ENCOUNTER — LAB (OUTPATIENT)
Dept: LAB | Facility: HOSPITAL | Age: 63
End: 2018-06-28
Attending: PHYSICIAN ASSISTANT
Payer: COMMERCIAL

## 2018-06-28 ENCOUNTER — ANESTHESIA (OUTPATIENT)
Dept: GASTROENTEROLOGY | Facility: HOSPITAL | Age: 63
End: 2018-06-28
Payer: COMMERCIAL

## 2018-06-28 VITALS
OXYGEN SATURATION: 95 % | HEART RATE: 100 BPM | BODY MASS INDEX: 48.32 KG/M2 | SYSTOLIC BLOOD PRESSURE: 129 MMHG | HEIGHT: 64 IN | TEMPERATURE: 97.7 F | DIASTOLIC BLOOD PRESSURE: 67 MMHG | WEIGHT: 283 LBS | RESPIRATION RATE: 16 BRPM

## 2018-06-28 DIAGNOSIS — R73.01 IFG (IMPAIRED FASTING GLUCOSE): ICD-10-CM

## 2018-06-28 DIAGNOSIS — Z01.818 PREOPERATIVE CLEARANCE: ICD-10-CM

## 2018-06-28 DIAGNOSIS — E66.01 MORBID OBESITY (HCC): ICD-10-CM

## 2018-06-28 DIAGNOSIS — E66.9 OBESITY, CLASS I, BMI 30-34.9: ICD-10-CM

## 2018-06-28 DIAGNOSIS — E66.01 MORBID OBESITY WITH BODY MASS INDEX (BMI) OF 50.0 TO 59.9 IN ADULT (HCC): ICD-10-CM

## 2018-06-28 DIAGNOSIS — E66.01 MORBID OBESITY WITH BODY MASS INDEX (BMI) OF 40.0 TO 49.9 (HCC): Primary | ICD-10-CM

## 2018-06-28 DIAGNOSIS — R73.01 IFG (IMPAIRED FASTING GLUCOSE): Primary | ICD-10-CM

## 2018-06-28 DIAGNOSIS — R63.5 ABNORMAL WEIGHT GAIN: ICD-10-CM

## 2018-06-28 DIAGNOSIS — I10 BENIGN ESSENTIAL HTN: ICD-10-CM

## 2018-06-28 LAB
ALBUMIN SERPL BCP-MCNC: 3.8 G/DL (ref 3.5–5)
ALP SERPL-CCNC: 104 U/L (ref 46–116)
ALT SERPL W P-5'-P-CCNC: 21 U/L (ref 12–78)
ANION GAP SERPL CALCULATED.3IONS-SCNC: 8 MMOL/L (ref 4–13)
AST SERPL W P-5'-P-CCNC: 13 U/L (ref 5–45)
BILIRUB SERPL-MCNC: 0.46 MG/DL (ref 0.2–1)
BUN SERPL-MCNC: 27 MG/DL (ref 5–25)
CALCIUM SERPL-MCNC: 9.3 MG/DL (ref 8.3–10.1)
CHLORIDE SERPL-SCNC: 104 MMOL/L (ref 100–108)
CHOLEST SERPL-MCNC: 232 MG/DL (ref 50–200)
CO2 SERPL-SCNC: 28 MMOL/L (ref 21–32)
CREAT SERPL-MCNC: 0.76 MG/DL (ref 0.6–1.3)
ERYTHROCYTE [DISTWIDTH] IN BLOOD BY AUTOMATED COUNT: 14.2 % (ref 11.6–15.1)
EST. AVERAGE GLUCOSE BLD GHB EST-MCNC: 114 MG/DL
GFR SERPL CREATININE-BSD FRML MDRD: 84 ML/MIN/1.73SQ M
GLUCOSE P FAST SERPL-MCNC: 106 MG/DL (ref 65–99)
HBA1C MFR BLD: 5.6 % (ref 4.2–6.3)
HCT VFR BLD AUTO: 41.3 % (ref 34.8–46.1)
HDLC SERPL-MCNC: 58 MG/DL (ref 40–60)
HGB BLD-MCNC: 12.9 G/DL (ref 11.5–15.4)
LDLC SERPL CALC-MCNC: 153 MG/DL (ref 0–100)
MCH RBC QN AUTO: 28.9 PG (ref 26.8–34.3)
MCHC RBC AUTO-ENTMCNC: 31.2 G/DL (ref 31.4–37.4)
MCV RBC AUTO: 93 FL (ref 82–98)
NONHDLC SERPL-MCNC: 174 MG/DL
PLATELET # BLD AUTO: 291 THOUSANDS/UL (ref 149–390)
PMV BLD AUTO: 9.5 FL (ref 8.9–12.7)
POTASSIUM SERPL-SCNC: 4.3 MMOL/L (ref 3.5–5.3)
PROT SERPL-MCNC: 7.7 G/DL (ref 6.4–8.2)
RBC # BLD AUTO: 4.46 MILLION/UL (ref 3.81–5.12)
SODIUM SERPL-SCNC: 140 MMOL/L (ref 136–145)
TRIGL SERPL-MCNC: 105 MG/DL
TSH SERPL DL<=0.05 MIU/L-ACNC: 2.42 UIU/ML (ref 0.36–3.74)
VIT B12 SERPL-MCNC: 726 PG/ML (ref 100–900)
WBC # BLD AUTO: 6.71 THOUSAND/UL (ref 4.31–10.16)

## 2018-06-28 PROCEDURE — 88342 IMHCHEM/IMCYTCHM 1ST ANTB: CPT | Performed by: PATHOLOGY

## 2018-06-28 PROCEDURE — 36415 COLL VENOUS BLD VENIPUNCTURE: CPT

## 2018-06-28 PROCEDURE — 84425 ASSAY OF VITAMIN B-1: CPT

## 2018-06-28 PROCEDURE — 43239 EGD BIOPSY SINGLE/MULTIPLE: CPT | Performed by: SURGERY

## 2018-06-28 PROCEDURE — 84590 ASSAY OF VITAMIN A: CPT

## 2018-06-28 PROCEDURE — 84443 ASSAY THYROID STIM HORMONE: CPT

## 2018-06-28 PROCEDURE — 88305 TISSUE EXAM BY PATHOLOGIST: CPT | Performed by: PATHOLOGY

## 2018-06-28 PROCEDURE — 88341 IMHCHEM/IMCYTCHM EA ADD ANTB: CPT | Performed by: PATHOLOGY

## 2018-06-28 PROCEDURE — 82306 VITAMIN D 25 HYDROXY: CPT

## 2018-06-28 PROCEDURE — 83036 HEMOGLOBIN GLYCOSYLATED A1C: CPT

## 2018-06-28 PROCEDURE — 85027 COMPLETE CBC AUTOMATED: CPT

## 2018-06-28 PROCEDURE — 80053 COMPREHEN METABOLIC PANEL: CPT

## 2018-06-28 PROCEDURE — 82607 VITAMIN B-12: CPT

## 2018-06-28 PROCEDURE — 80061 LIPID PANEL: CPT

## 2018-06-28 RX ORDER — SODIUM CHLORIDE 9 MG/ML
125 INJECTION, SOLUTION INTRAVENOUS CONTINUOUS
Status: DISCONTINUED | OUTPATIENT
Start: 2018-06-28 | End: 2018-06-28 | Stop reason: HOSPADM

## 2018-06-28 RX ORDER — OMEPRAZOLE 20 MG/1
20 TABLET, DELAYED RELEASE ORAL DAILY
Qty: 30 TABLET | Refills: 0 | Status: CANCELLED | OUTPATIENT
Start: 2018-06-28 | End: 2018-07-28

## 2018-06-28 RX ORDER — PROPOFOL 10 MG/ML
INJECTION, EMULSION INTRAVENOUS AS NEEDED
Status: DISCONTINUED | OUTPATIENT
Start: 2018-06-28 | End: 2018-06-28 | Stop reason: SURG

## 2018-06-28 RX ADMIN — PROPOFOL 200 MG: 10 INJECTION, EMULSION INTRAVENOUS at 08:11

## 2018-06-28 RX ADMIN — SODIUM CHLORIDE 125 ML/HR: 0.9 INJECTION, SOLUTION INTRAVENOUS at 07:47

## 2018-06-28 RX ADMIN — LIDOCAINE HYDROCHLORIDE 100 MG: 20 INJECTION, SOLUTION INTRAVENOUS at 08:11

## 2018-06-28 NOTE — PRE-PROCEDURE INSTRUCTIONS
Endo process reviewed with pt  Call bell in reach on rail  Pillow provided at pt  Request for under knees  Pt  Needs labs drawn after procedure  Pt  Comfortable

## 2018-06-28 NOTE — OP NOTE
OPERATIVE REPORT  PATIENT NAME: Olman Aj    :  1955  MRN: [id number]  Pt Location: [facility] 01    SURGERY DATE: 2018    Surgeon(s) and Role:     * Raisa Scott MD - Primary    Preop Diagnosis:  Morbid obesity ([facility] [address] ) [E66 01]    Post-Op Diagnosis Codes:     * Morbid obesity ([facility] [address] ) [E66 01]    Procedure(s) (LRB):  ESOPHAGOGASTRODUODENOSCOPY (EGD) (N/A)    Specimen(s):  * No specimens in log *    Estimated Blood Loss:   Minimal    Drains:       Anesthesia Type:   IV Sedation with Anesthesia    Operative Indications: Morbid obesity ([facility] ) [E66 01]      Operative Findings:  Esophagitis LA grade II  Hiatal hernia  Gastritis    Complications:   None    Procedure and Technique:  EGD and biopsy   I was present for the entire procedure    Patient Disposition:  hemodynamically stable             Indication:   Patient suffers from morbid obesity and associated co-morbidities  and failed to achieve any meaningful or sustainable weight loss  Patient presented for a bariatric procedure and was consented for a preoperative upper endoscopy and biopsy to rule out H  Pylori  Description of the procedure: The patient was brought to the endoscopy suite and was placed in  left lateral decubitus position  A time-out was called and the patient was identified by name and by armband  The patient received IV  Propofol under closed monitoring  by the anesthesiologist and nurse anesthesist     Upper endoscopy was performed under direct visualization  Esophagus was visualized and showed esophagitis   The GE junction showed a hiatal hernia   The stomach was then inspected and showed mild gastritis    First and second portion of duodenum were inspected and appeared to be normal  Biopsy was taken with a punch biopsy forceps from the antrum and sent to pathology to rule out H  Pylori   Retroflex view of the GE junction was obtained and was normal      The patient tolerated the procedure well and was transferred to the recovery unit in stable condition           I    SIGNATURE: Tip Mg MD  DATE: June 28, 2018  TIME: 8:07 AM

## 2018-06-28 NOTE — ANESTHESIA PREPROCEDURE EVALUATION
Review of Systems/Medical History  Patient summary reviewed  Chart reviewed  No history of anesthetic complications     Cardiovascular  Hypertension controlled,    Pulmonary  Asthma Last rescue: < 1 month ago Asthma type of rescue: PRN inhaler,        GI/Hepatic  Negative GI/hepatic ROS          Negative  ROS        Endo/Other    Obesity  morbid obesity   GYN  Negative gynecology ROS          Hematology  Negative hematology ROS      Musculoskeletal    Arthritis     Neurology  Negative neurology ROS      Psychology   Anxiety, Depression , being treated for depression,              Physical Exam    Airway    Mallampati score: II  TM Distance: >3 FB  Neck ROM: full     Dental   No notable dental hx     Cardiovascular  Rhythm: regular, Rate: normal, Cardiovascular exam normal    Pulmonary  Pulmonary exam normal Breath sounds clear to auscultation,     Other Findings        Anesthesia Plan  ASA Score- 3     Anesthesia Type- IV sedation with anesthesia with ASA Monitors  Additional Monitors:   Airway Plan:         Plan Factors-Patient not instructed to abstain from smoking on day of procedure  Patient did not smoke on day of surgery  Induction- intravenous  Postoperative Plan-     Informed Consent- Anesthetic plan and risks discussed with patient

## 2018-06-28 NOTE — TELEPHONE ENCOUNTER
Please call the pt and let her know that we are still waiting on labs to be resulted, however, her glucose has been resulted and was slightly elevated  We typically check an A1c  If the lab is able to add it to what they collected today, would she be agreeable for me to have them add it? This is to screen her for diabetes and would be recommended prior to surgery  Thank you!

## 2018-07-01 LAB — VIT A SERPL-MCNC: 44.6 UG/DL (ref 36.4–108)

## 2018-07-02 LAB — VIT B1 BLD-SCNC: 144.3 NMOL/L (ref 66.5–200)

## 2018-07-03 LAB
25(OH)D2 SERPL-MCNC: <1 NG/ML
25(OH)D3 SERPL-MCNC: 46 NG/ML
25(OH)D3+25(OH)D2 SERPL-MCNC: 46 NG/ML

## 2018-07-12 ENCOUNTER — OFFICE VISIT (OUTPATIENT)
Dept: BARIATRICS | Facility: CLINIC | Age: 63
End: 2018-07-12

## 2018-07-12 VITALS — WEIGHT: 276.1 LBS | HEIGHT: 64 IN | BODY MASS INDEX: 47.14 KG/M2

## 2018-07-12 DIAGNOSIS — Z98.84 BARIATRIC SURGERY STATUS: ICD-10-CM

## 2018-07-12 DIAGNOSIS — E66.01 MORBID (SEVERE) OBESITY DUE TO EXCESS CALORIES (HCC): Primary | ICD-10-CM

## 2018-07-12 PROCEDURE — RECHECK

## 2018-07-12 NOTE — PROGRESS NOTES
Bariatric Follow Up Nutrition Note    Preop  4 Month Program    Type of surgery  Preop  Surgery Date: TBD  Surgeon: Dr Camargo Reasons  59 y o   female  Height 5' 3 5" (1 613 m), weight 125 kg (276 lb 1 6 oz)  Review of History and Medications   Past Medical History:   Diagnosis Date    Allergic rhinitis     last assessed 14, resolved 12/22/15    Anxiety     Arthritis     resolved 12/22/15    Asthma     [facility] )     derma fibroid sarcoma protuberance    Depression     History of cellulitis     Hypertension     Benign essential     Joint stiffness of knee     Lumbar disc disease     Morbid obesity ([facility] [address] )     Osteoarthritis     Pre-operative laboratory examination      Past Surgical History:   Procedure Laterality Date     SECTION      last assessed 14    MOHS SURGERY Right     upper arm    CA EGD TRANSORAL BIOPSY SINGLE/MULTIPLE N/A 2018    Procedure: ESOPHAGOGASTRODUODENOSCOPY (EGD) with bx;  Surgeon: Sweta Degroot MD;  Location: [facility];   Service: Bariatrics     Social History     Social History    Marital status: /Civil Union     Spouse name: N/A    Number of children: N/A    Years of education: N/A     Social History Main Topics    Smoking status: Never Smoker    Smokeless tobacco: Never Used    Alcohol use Yes      Comment: wine - rarely    Drug use: No    Sexual activity: Not on file     Other Topics Concern    Not on file     Social History Narrative    No narrative on file       Current Outpatient Prescriptions:     albuterol (PROAIR HFA) 90 mcg/act inhaler, Inhale, Disp: , Rfl:     aspirin 81 mg chewable tablet, Chew 81 mg daily, Disp: , Rfl:     Cholecalciferol (VITAMIN D) 2000 units CAPS, Take 1 capsule by mouth daily, Disp: , Rfl:     ipratropium-albuterol (DUO-NEB) 0 5-2 5 mg/3 mL, Inhale, Disp: , Rfl:     levocetirizine (XYZAL) 5 MG tablet, Take 1 tablet (5 mg total) by mouth daily, Disp: 90 tablet, Rfl: 3    LORazepam (ATIVAN) 0 5 mg tablet, Take by mouth as needed  , Disp: , Rfl:     losartan (COZAAR) 100 MG tablet, Take 1 tablet by mouth daily, Disp: , Rfl:     Multiple Vitamins-Minerals (WOMENS MULTIVITAMIN PO), Take by mouth daily at bedtime, Disp: , Rfl:     Food Intake and Lifestyle Assessment   Food Intake Assessment completed via 24 hour recall  Breakfast: coffee and greek gyourt or cottage cheese with a sprinkle of granola  Snack: cheese stick   Lunch: salad with veggies and grilled chicken and balsamic vinaigrette  Snack:  Cheese stick  Dinner: lean protein and veggies  Snack: none  Beverage intake: water and coffee/tea  Diet texture/stage: regular  Protein supplement: not at this time  Estimated protein intake per day: 75gm  Estimated fluid intake per day: 64oz  Meals eaten away from home: none  Typical meal pattern: 3 meals per day and 2 snacks per day  Eating Behaviors: Appropriate portion sizes    Physical Activity  Types of exercise: None  Current physical limitations: bad knees    Psychosocial Assessment   Support systems: spouse and children  Socioeconomic factors: none    Nutrition Diagnosis  Diagnosis: Overweight / Obesity (NC-3 3)  Related to: Excessive energy intake in the past, but has made changes and has lost weight  As Evidenced by: BMI >25     Interventions and Teaching   Patient educated on post-op nutrition guidelines  Patient educated and handouts provided  Diet progression  Adequate hydration  Nutrition considerations after surgery  Vitamin / Mineral supplementation for post-op    Education provided to: patient    Barriers to learning: No barriers identified    Readiness to change: action    Comprehension: verbalizes understanding     Expected Compliance: excellent    Goals  Food journal, Exercise 30 minutes 5 times per week, Complete lession plans 1-6, Eat 3 meals per day     Pt has done well losing 15lbs over the past 4 months    She has made a lot of diet changes mostly greatly reducing the carbs and decreasing her portion sizes  Discussed post-op diet progression, vitamins and hydration  Advised pt will get further education in pre-op class  Pt has completed her required 4 month program    will submit for pre-auth and contact pt when she hears back  Pt verbalizes understanding         Time Spent:   30 Minutes

## 2018-07-16 DIAGNOSIS — I10 ESSENTIAL HYPERTENSION: Primary | ICD-10-CM

## 2018-07-16 RX ORDER — LOSARTAN POTASSIUM 100 MG/1
100 TABLET ORAL DAILY
Qty: 90 TABLET | Refills: 3 | Status: SHIPPED | OUTPATIENT
Start: 2018-07-16 | End: 2019-06-23 | Stop reason: SDUPTHER

## 2018-07-19 PROBLEM — I10 ESSENTIAL HYPERTENSION, BENIGN: Status: ACTIVE | Noted: 2018-07-19

## 2018-07-19 PROBLEM — M19.90 ACUTE ARTHRITIS: Status: ACTIVE | Noted: 2018-07-19

## 2018-07-19 PROBLEM — J45.909 ASTHMATIC BRONCHITIS WITHOUT COMPLICATION: Status: ACTIVE | Noted: 2018-07-19

## 2018-07-31 DIAGNOSIS — F41.9 ANXIETY: Primary | ICD-10-CM

## 2018-08-01 RX ORDER — LORAZEPAM 0.5 MG/1
0.5 TABLET ORAL AS NEEDED
Qty: 60 TABLET | Refills: 0 | Status: SHIPPED | OUTPATIENT
Start: 2018-08-01 | End: 2018-11-27 | Stop reason: SDUPTHER

## 2018-08-08 ENCOUNTER — ANESTHESIA EVENT (OUTPATIENT)
Dept: PERIOP | Facility: HOSPITAL | Age: 63
DRG: 621 | End: 2018-08-08
Payer: COMMERCIAL

## 2018-08-08 RX ORDER — SCOLOPAMINE TRANSDERMAL SYSTEM 1 MG/1
1 PATCH, EXTENDED RELEASE TRANSDERMAL ONCE
Status: CANCELLED | OUTPATIENT
Start: 2018-08-28 | End: 2018-08-28

## 2018-08-08 RX ORDER — SODIUM CHLORIDE 9 MG/ML
125 INJECTION, SOLUTION INTRAVENOUS CONTINUOUS
Status: CANCELLED | OUTPATIENT
Start: 2018-08-28 | End: 2019-08-28

## 2018-08-09 ENCOUNTER — APPOINTMENT (OUTPATIENT)
Dept: PREADMISSION TESTING | Facility: HOSPITAL | Age: 63
DRG: 621 | End: 2018-08-09
Payer: COMMERCIAL

## 2018-08-09 RX ORDER — ACETAMINOPHEN 500 MG
500 TABLET ORAL EVERY 6 HOURS PRN
COMMUNITY

## 2018-08-09 RX ORDER — OMEPRAZOLE 20 MG/1
20 CAPSULE, DELAYED RELEASE ORAL DAILY
COMMUNITY
End: 2018-08-31 | Stop reason: SDUPTHER

## 2018-08-09 NOTE — ANESTHESIA PREPROCEDURE EVALUATION
Review of Systems/Medical History  Patient summary reviewed  Chart reviewed  No history of anesthetic complications     Cardiovascular  Hypertension controlled,    Pulmonary  Asthma Last rescue: < 1 month ago Asthma type of rescue: PRN inhaler,        GI/Hepatic  Negative GI/hepatic ROS          Negative  ROS        Endo/Other    Obesity  morbid obesity   GYN  Negative gynecology ROS          Hematology  Negative hematology ROS      Musculoskeletal    Arthritis     Neurology  Negative neurology ROS      Psychology   Anxiety, Depression , being treated for depression,              Physical Exam    Airway    Mallampati score: II  TM Distance: >3 FB  Neck ROM: full     Dental   No notable dental hx     Cardiovascular  Rhythm: regular, Rate: normal, Cardiovascular exam normal    Pulmonary  Pulmonary exam normal Breath sounds clear to auscultation,     Other Findings        Anesthesia Plan  ASA Score- 3     Anesthesia Type- general with ASA Monitors  Additional Monitors:   Airway Plan: ETT  Comment: SCOP patch ordered  Plan Factors-Patient not instructed to abstain from smoking on day of procedure  Patient did not smoke on day of surgery  Induction- intravenous  Postoperative Plan- Plan for postoperative opioid use  Informed Consent- Anesthetic plan and risks discussed with patient

## 2018-08-09 NOTE — PRE-PROCEDURE INSTRUCTIONS
Pre-Surgery Instructions:   Medication Instructions    acetaminophen (TYLENOL) 500 mg tablet Patient was instructed by Physician and understands   albuterol (PROAIR HFA) 90 mcg/act inhaler Patient was instructed by Physician and understands   Cholecalciferol (VITAMIN D) 2000 units CAPS Patient was instructed by Physician and understands   ipratropium-albuterol (DUO-NEB) 0 5-2 5 mg/3 mL Patient was instructed by Physician and understands   levocetirizine (XYZAL) 5 MG tablet Patient was instructed by Physician and understands   LORazepam (ATIVAN) 0 5 mg tablet Patient was instructed by Physician and understands   losartan (COZAAR) 100 MG tablet Patient was instructed by Physician and understands   Multiple Vitamins-Minerals (WOMENS MULTIVITAMIN PO) Patient was instructed by Physician and understands   omeprazole (PriLOSEC) 20 mg delayed release capsule Patient was instructed by Physician and understands  Pt instructed by Dr Sky Doss to take omeprazole and ativan *with a sip of water the morning of surgery  Pt given/reviewed [facility] preop instructions and chlorhexadine soap   Pt to hold asa/NSAIDS/vitamins/herbal supplements one week before surgery or as per Dr Dianna Coronel

## 2018-08-21 ENCOUNTER — TELEPHONE (OUTPATIENT)
Dept: BARIATRICS | Facility: CLINIC | Age: 63
End: 2018-08-21

## 2018-08-23 DIAGNOSIS — E66.01 MORBID (SEVERE) OBESITY DUE TO EXCESS CALORIES (HCC): Primary | ICD-10-CM

## 2018-08-24 ENCOUNTER — OFFICE VISIT (OUTPATIENT)
Dept: BARIATRICS | Facility: CLINIC | Age: 63
End: 2018-08-24
Payer: COMMERCIAL

## 2018-08-24 VITALS
WEIGHT: 268 LBS | HEART RATE: 86 BPM | DIASTOLIC BLOOD PRESSURE: 86 MMHG | BODY MASS INDEX: 45.75 KG/M2 | SYSTOLIC BLOOD PRESSURE: 128 MMHG | TEMPERATURE: 97.8 F | HEIGHT: 64 IN

## 2018-08-24 DIAGNOSIS — E66.01 OBESITY, CLASS III, BMI 40-49.9 (MORBID OBESITY) (HCC): Primary | ICD-10-CM

## 2018-08-24 PROCEDURE — 99214 OFFICE O/P EST MOD 30 MIN: CPT | Performed by: SURGERY

## 2018-08-24 RX ORDER — HEPARIN SODIUM 5000 [USP'U]/ML
5000 INJECTION, SOLUTION INTRAVENOUS; SUBCUTANEOUS
Status: CANCELLED | OUTPATIENT
Start: 2018-08-25 | End: 2018-08-26

## 2018-08-24 RX ORDER — OXYCODONE HYDROCHLORIDE AND ACETAMINOPHEN 5; 325 MG/1; MG/1
1 TABLET ORAL EVERY 4 HOURS PRN
Qty: 30 TABLET | Refills: 0 | Status: SHIPPED | OUTPATIENT
Start: 2018-08-24 | End: 2018-08-29

## 2018-08-24 NOTE — PROGRESS NOTES
BARIATRIC HISTORY AND PHYSICAL - BARIATRIC SURGERY  Mayela Peralta 59 y o  female MRN: [id number]  Unit/Bed#:  Encounter: [id number]      HPI:  Mayela Peralta is a 59 y o  female who presents to review her preoperative workup and see if she is a good candidate to undergo a bariatric procedure  Review of Systems   Constitutional: Negative  HENT: Negative  Eyes: Negative  Respiratory: Negative  Cardiovascular: Negative  Gastrointestinal: Negative  Endocrine: Negative  Genitourinary: Negative  Musculoskeletal: Negative  Skin: Negative  Neurological: Negative  Hematological: Negative  Psychiatric/Behavioral: Negative  Historical Information   Past Medical History:   Diagnosis Date    Allergic rhinitis     last assessed 14, resolved 12/22/15    Anxiety     Arthritis     resolved 12/22/15    Asthma     [facility]     derma fibroid sarcoma protuberance    Depression     "situational"    Environmental and seasonal allergies     "smoke,perfume and mold some of my triggers"    GERD (gastroesophageal reflux disease)     Hiatal hernia     History of cellulitis     Hyperlipidemia     "a little high"    Hypertension     Benign essential     Joint stiffness of knee     Low back pain     Lumbar disc disease     [facility]bid [facility] ([facility] )     Osteoarthritis     Pre-operative laboratory examination     Right knee pain     Shortness of breath     "occas "    Use of cane as ambulatory aid     "for long distances"     Past Surgical History:   Procedure Laterality Date     SECTION      last assessed 14    MOHS SURGERY Right     upper arm    [facility] EGD TRANSORAL BIOPSY SINGLE/MULTIPLE N/A 2018    Procedure: ESOPHAGOGASTRODUODENOSCOPY (EGD) with bx;  Surgeon: Polina Anguiano MD;  Location: [facility];   Service: Bariatrics     Social History   History   Alcohol Use    Yes     Comment: rare     History   Drug Use No     History   Smoking Status    Never Smoker   Smokeless Tobacco    Never Used     Family History: non-contributory    Meds/Allergies   all medications and allergies reviewed  No Known Allergies    Objective     Current Vitals:   Blood Pressure: 128/86 (08/24/18 1442)  Pulse: 86 (08/24/18 1442)  Temperature: 97 8 °F (36 6 °C) (08/24/18 1442)  Temp Source: Tympanic (08/24/18 1442)  Height: 5' 3 5" (161 3 cm) (08/24/18 1442)  Weight - Scale: 122 kg (268 lb) (08/24/18 1442)  Body mass index is 46 73 kg/m²  [unfilled]    Invasive Devices          No matching active lines, drains, or airways          Physical Exam   Constitutional: She is oriented to person, place, and time  She appears well-developed  HENT:   Head: Normocephalic and atraumatic  Eyes: Conjunctivae and EOM are normal    Neck: Normal range of motion  Neck supple  Cardiovascular: Normal rate, regular rhythm, normal heart sounds and intact distal pulses  Pulmonary/Chest: Effort normal and breath sounds normal    Abdominal: Soft  Bowel sounds are normal    Musculoskeletal: Normal range of motion  Neurological: She is alert and oriented to person, place, and time  She has normal reflexes  Skin: Skin is warm and dry  Psychiatric: She has a normal mood and affect  Lab Results: I have personally reviewed pertinent lab results  Imaging: I have personally reviewed pertinent reports  EKG, Pathology, and Other Studies: I have personally reviewed pertinent reports  Code Status: [unfilled]  Advance Directive and Living Will:      Power of :    POLST:      Assessment/Plan:      The patient presented to review the preoperative workup and see if bariatric surgery is appropriate and indicated following the extensive preoperative workup and the enrollment in our weight loss program    Preoperative workup was complete  Results were reviewed with the patient including the blood work results and the endoscopy findings and the biopsy results   We also reviewed the cardiology evaluation  I believe that the patient will be a good candidate for laparoscopic AMISH-EN-Y GASTRIC BYPASS    Patient will need to start the 2 week liquid diet prior to surgery  Risks and benefits explained one more time to the patient  Alternatives to surgery and alternative forms of surgery were also explained  Post-surgical commitment and after care programs were explained  We also discussed that the [employer] robot may be available to us to use on the day of the patient procedure  and that the procedure may be performed robotically  I discussed in details the advantages and disadvantages of this approach including the potential decrease in postoperative pain because of the remote center technology  I also mentioned the lack of strong evidence for  the use of robot in bariatric patients and the potential disadvantage to patients because of the prolonged operative time  Consent was signed  Questions were answered and concerns were addressed  Patient wishes to proceed  As per  [facility] [address] guidelines, I had a discussion with the patient regarding his CODE STATUS in the perioperative period and the patient is level 1 or FULL CODE STATUS

## 2018-08-28 ENCOUNTER — HOSPITAL ENCOUNTER (INPATIENT)
Facility: HOSPITAL | Age: 63
LOS: 1 days | Discharge: HOME | DRG: 621 | End: 2018-08-29
Attending: SURGERY | Admitting: SURGERY
Payer: COMMERCIAL

## 2018-08-28 ENCOUNTER — ANESTHESIA (OUTPATIENT)
Dept: PERIOP | Facility: HOSPITAL | Age: 63
DRG: 621 | End: 2018-08-28
Payer: COMMERCIAL

## 2018-08-28 PROCEDURE — 43644 LAP GASTRIC BYPASS/ROUX-EN-Y: CPT | Performed by: SURGERY

## 2018-08-28 PROCEDURE — 0D164ZA BYPASS STOMACH TO JEJUNUM, PERCUTANEOUS ENDOSCOPIC APPROACH: ICD-10-PCS | Performed by: SURGERY

## 2018-08-28 PROCEDURE — 0DJ08ZZ INSPECTION OF UPPER INTESTINAL TRACT, VIA NATURAL OR ARTIFICIAL OPENING ENDOSCOPIC: ICD-10-PCS | Performed by: SURGERY

## 2018-08-28 PROCEDURE — C9113 INJ PANTOPRAZOLE SODIUM, VIA: HCPCS | Performed by: SURGERY

## 2018-08-28 RX ORDER — HEPARIN SODIUM 5000 [USP'U]/ML
5000 INJECTION, SOLUTION INTRAVENOUS; SUBCUTANEOUS
Status: COMPLETED | OUTPATIENT
Start: 2018-08-28 | End: 2018-08-28

## 2018-08-28 RX ORDER — MIDAZOLAM HYDROCHLORIDE 1 MG/ML
INJECTION INTRAMUSCULAR; INTRAVENOUS AS NEEDED
Status: DISCONTINUED | OUTPATIENT
Start: 2018-08-28 | End: 2018-08-28 | Stop reason: SURG

## 2018-08-28 RX ORDER — ONDANSETRON 2 MG/ML
4 INJECTION INTRAMUSCULAR; INTRAVENOUS EVERY 6 HOURS PRN
Status: DISCONTINUED | OUTPATIENT
Start: 2018-08-28 | End: 2018-08-29 | Stop reason: HOSPADM

## 2018-08-28 RX ORDER — ACETAMINOPHEN 160 MG/5ML
650 SUSPENSION, ORAL (FINAL DOSE FORM) ORAL EVERY 4 HOURS PRN
Status: DISCONTINUED | OUTPATIENT
Start: 2018-08-28 | End: 2018-08-29 | Stop reason: HOSPADM

## 2018-08-28 RX ORDER — PROPOFOL 10 MG/ML
INJECTION, EMULSION INTRAVENOUS AS NEEDED
Status: DISCONTINUED | OUTPATIENT
Start: 2018-08-28 | End: 2018-08-28 | Stop reason: SURG

## 2018-08-28 RX ORDER — FENTANYL CITRATE 50 UG/ML
INJECTION, SOLUTION INTRAMUSCULAR; INTRAVENOUS AS NEEDED
Status: DISCONTINUED | OUTPATIENT
Start: 2018-08-28 | End: 2018-08-28 | Stop reason: SURG

## 2018-08-28 RX ORDER — BUPIVACAINE HYDROCHLORIDE AND EPINEPHRINE 5; 5 MG/ML; UG/ML
INJECTION, SOLUTION PERINEURAL AS NEEDED
Status: DISCONTINUED | OUTPATIENT
Start: 2018-08-28 | End: 2018-08-28 | Stop reason: HOSPADM

## 2018-08-28 RX ORDER — GLYCOPYRROLATE 0.2 MG/ML
INJECTION INTRAMUSCULAR; INTRAVENOUS AS NEEDED
Status: DISCONTINUED | OUTPATIENT
Start: 2018-08-28 | End: 2018-08-28 | Stop reason: SURG

## 2018-08-28 RX ORDER — METOCLOPRAMIDE HYDROCHLORIDE 5 MG/ML
10 INJECTION INTRAMUSCULAR; INTRAVENOUS EVERY 6 HOURS PRN
Status: DISCONTINUED | OUTPATIENT
Start: 2018-08-28 | End: 2018-08-29 | Stop reason: HOSPADM

## 2018-08-28 RX ORDER — ALBUTEROL SULFATE 90 UG/1
2 AEROSOL, METERED RESPIRATORY (INHALATION) EVERY 6 HOURS PRN
Status: DISCONTINUED | OUTPATIENT
Start: 2018-08-28 | End: 2018-08-29 | Stop reason: HOSPADM

## 2018-08-28 RX ORDER — SODIUM CHLORIDE 9 MG/ML
125 INJECTION, SOLUTION INTRAVENOUS CONTINUOUS
Status: DISCONTINUED | OUTPATIENT
Start: 2018-08-28 | End: 2018-08-29

## 2018-08-28 RX ORDER — SCOLOPAMINE TRANSDERMAL SYSTEM 1 MG/1
1 PATCH, EXTENDED RELEASE TRANSDERMAL ONCE
Status: DISCONTINUED | OUTPATIENT
Start: 2018-08-28 | End: 2018-08-29 | Stop reason: HOSPADM

## 2018-08-28 RX ORDER — DEXAMETHASONE SODIUM PHOSPHATE 4 MG/ML
INJECTION, SOLUTION INTRA-ARTICULAR; INTRALESIONAL; INTRAMUSCULAR; INTRAVENOUS; SOFT TISSUE AS NEEDED
Status: DISCONTINUED | OUTPATIENT
Start: 2018-08-28 | End: 2018-08-28

## 2018-08-28 RX ORDER — SODIUM CHLORIDE, SODIUM LACTATE, POTASSIUM CHLORIDE, CALCIUM CHLORIDE 600; 310; 30; 20 MG/100ML; MG/100ML; MG/100ML; MG/100ML
75 INJECTION, SOLUTION INTRAVENOUS CONTINUOUS
Status: DISCONTINUED | OUTPATIENT
Start: 2018-08-28 | End: 2018-08-29 | Stop reason: HOSPADM

## 2018-08-28 RX ORDER — FENTANYL CITRATE/PF 50 MCG/ML
50 SYRINGE (ML) INJECTION
Status: COMPLETED | OUTPATIENT
Start: 2018-08-28 | End: 2018-08-28

## 2018-08-28 RX ORDER — ROCURONIUM BROMIDE 10 MG/ML
INJECTION, SOLUTION INTRAVENOUS AS NEEDED
Status: DISCONTINUED | OUTPATIENT
Start: 2018-08-28 | End: 2018-08-28 | Stop reason: SURG

## 2018-08-28 RX ORDER — HYDROMORPHONE HYDROCHLORIDE 2 MG/ML
INJECTION, SOLUTION INTRAMUSCULAR; INTRAVENOUS; SUBCUTANEOUS AS NEEDED
Status: DISCONTINUED | OUTPATIENT
Start: 2018-08-28 | End: 2018-08-28 | Stop reason: SURG

## 2018-08-28 RX ORDER — PANTOPRAZOLE SODIUM 40 MG/1
40 INJECTION, POWDER, FOR SOLUTION INTRAVENOUS
Status: DISCONTINUED | OUTPATIENT
Start: 2018-08-28 | End: 2018-08-29 | Stop reason: HOSPADM

## 2018-08-28 RX ORDER — DEXAMETHASONE SODIUM PHOSPHATE 4 MG/ML
INJECTION, SOLUTION INTRA-ARTICULAR; INTRALESIONAL; INTRAMUSCULAR; INTRAVENOUS; SOFT TISSUE AS NEEDED
Status: DISCONTINUED | OUTPATIENT
Start: 2018-08-28 | End: 2018-08-28 | Stop reason: SURG

## 2018-08-28 RX ORDER — IPRATROPIUM BROMIDE AND ALBUTEROL SULFATE 2.5; .5 MG/3ML; MG/3ML
3 SOLUTION RESPIRATORY (INHALATION) EVERY 6 HOURS PRN
Status: DISCONTINUED | OUTPATIENT
Start: 2018-08-28 | End: 2018-08-29 | Stop reason: HOSPADM

## 2018-08-28 RX ORDER — OXYCODONE HCL 5 MG/5 ML
10 SOLUTION, ORAL ORAL EVERY 4 HOURS PRN
Status: DISCONTINUED | OUTPATIENT
Start: 2018-08-28 | End: 2018-08-29 | Stop reason: HOSPADM

## 2018-08-28 RX ORDER — PROMETHAZINE HYDROCHLORIDE 25 MG/ML
12.5 INJECTION, SOLUTION INTRAMUSCULAR; INTRAVENOUS EVERY 6 HOURS PRN
Status: DISCONTINUED | OUTPATIENT
Start: 2018-08-28 | End: 2018-08-29 | Stop reason: HOSPADM

## 2018-08-28 RX ORDER — ONDANSETRON 2 MG/ML
INJECTION INTRAMUSCULAR; INTRAVENOUS AS NEEDED
Status: DISCONTINUED | OUTPATIENT
Start: 2018-08-28 | End: 2018-08-28 | Stop reason: SURG

## 2018-08-28 RX ORDER — ONDANSETRON 2 MG/ML
4 INJECTION INTRAMUSCULAR; INTRAVENOUS ONCE AS NEEDED
Status: DISCONTINUED | OUTPATIENT
Start: 2018-08-28 | End: 2018-08-28 | Stop reason: HOSPADM

## 2018-08-28 RX ORDER — MORPHINE SULFATE 4 MG/ML
4 INJECTION, SOLUTION INTRAMUSCULAR; INTRAVENOUS EVERY 2 HOUR PRN
Status: DISCONTINUED | OUTPATIENT
Start: 2018-08-28 | End: 2018-08-29 | Stop reason: HOSPADM

## 2018-08-28 RX ORDER — HYDRALAZINE HYDROCHLORIDE 20 MG/ML
10 INJECTION INTRAMUSCULAR; INTRAVENOUS EVERY 6 HOURS PRN
Status: DISCONTINUED | OUTPATIENT
Start: 2018-08-28 | End: 2018-08-29 | Stop reason: HOSPADM

## 2018-08-28 RX ORDER — MORPHINE SULFATE 2 MG/ML
2 INJECTION, SOLUTION INTRAMUSCULAR; INTRAVENOUS EVERY 2 HOUR PRN
Status: DISCONTINUED | OUTPATIENT
Start: 2018-08-28 | End: 2018-08-29 | Stop reason: HOSPADM

## 2018-08-28 RX ADMIN — SODIUM CHLORIDE, SODIUM LACTATE, POTASSIUM CHLORIDE, AND CALCIUM CHLORIDE 125 ML/HR: .6; .31; .03; .02 INJECTION, SOLUTION INTRAVENOUS at 17:39

## 2018-08-28 RX ADMIN — FENTANYL CITRATE 50 MCG: 50 INJECTION, SOLUTION INTRAMUSCULAR; INTRAVENOUS at 14:28

## 2018-08-28 RX ADMIN — HEPARIN SODIUM 5000 UNITS: 5000 INJECTION, SOLUTION INTRAVENOUS; SUBCUTANEOUS at 11:42

## 2018-08-28 RX ADMIN — CEFAZOLIN SODIUM 2000 MG: 2 SOLUTION INTRAVENOUS at 13:08

## 2018-08-28 RX ADMIN — OXYCODONE HYDROCHLORIDE 10 MG: 5 SOLUTION ORAL at 21:39

## 2018-08-28 RX ADMIN — PANTOPRAZOLE SODIUM 40 MG: 40 INJECTION, POWDER, FOR SOLUTION INTRAVENOUS at 17:58

## 2018-08-28 RX ADMIN — HYDROMORPHONE HYDROCHLORIDE 1 MG: 2 INJECTION, SOLUTION INTRAMUSCULAR; INTRAVENOUS; SUBCUTANEOUS at 13:39

## 2018-08-28 RX ADMIN — HYDROMORPHONE HYDROCHLORIDE 0.5 MG: 1 INJECTION, SOLUTION INTRAMUSCULAR; INTRAVENOUS; SUBCUTANEOUS at 16:00

## 2018-08-28 RX ADMIN — CEFAZOLIN SODIUM 2000 MG: 2 SOLUTION INTRAVENOUS at 20:46

## 2018-08-28 RX ADMIN — PROPOFOL 200 MG: 10 INJECTION, EMULSION INTRAVENOUS at 13:00

## 2018-08-28 RX ADMIN — FENTANYL CITRATE 50 MCG: 50 INJECTION, SOLUTION INTRAMUSCULAR; INTRAVENOUS at 15:34

## 2018-08-28 RX ADMIN — ACETAMINOPHEN 650 MG: 160 SUSPENSION ORAL at 21:42

## 2018-08-28 RX ADMIN — DEXAMETHASONE SODIUM PHOSPHATE 4 MG: 4 INJECTION, SOLUTION INTRAMUSCULAR; INTRAVENOUS at 14:42

## 2018-08-28 RX ADMIN — HYDROMORPHONE HYDROCHLORIDE 0.5 MG: 1 INJECTION, SOLUTION INTRAMUSCULAR; INTRAVENOUS; SUBCUTANEOUS at 16:10

## 2018-08-28 RX ADMIN — SCOPALAMINE 1 PATCH: 1 PATCH, EXTENDED RELEASE TRANSDERMAL at 10:36

## 2018-08-28 RX ADMIN — ROCURONIUM BROMIDE 50 MG: 10 INJECTION INTRAVENOUS at 13:00

## 2018-08-28 RX ADMIN — ONDANSETRON HYDROCHLORIDE 4 MG: 2 INJECTION, SOLUTION INTRAVENOUS at 14:42

## 2018-08-28 RX ADMIN — MORPHINE SULFATE 4 MG: 4 INJECTION INTRAVENOUS at 19:24

## 2018-08-28 RX ADMIN — GLYCOPYRROLATE 0.4 MG: 0.2 INJECTION, SOLUTION INTRAMUSCULAR; INTRAVENOUS at 14:46

## 2018-08-28 RX ADMIN — METRONIDAZOLE 500 MG: 500 INJECTION, SOLUTION INTRAVENOUS at 13:17

## 2018-08-28 RX ADMIN — FENTANYL CITRATE 50 MCG: 50 INJECTION, SOLUTION INTRAMUSCULAR; INTRAVENOUS at 15:27

## 2018-08-28 RX ADMIN — FENTANYL CITRATE 50 MCG: 50 INJECTION, SOLUTION INTRAMUSCULAR; INTRAVENOUS at 15:09

## 2018-08-28 RX ADMIN — SODIUM CHLORIDE: 0.9 INJECTION, SOLUTION INTRAVENOUS at 14:45

## 2018-08-28 RX ADMIN — LIDOCAINE HYDROCHLORIDE 50 MG: 20 INJECTION, SOLUTION INTRAVENOUS at 13:00

## 2018-08-28 RX ADMIN — NEOSTIGMINE METHYLSULFATE 3 MG: 1 INJECTION, SOLUTION INTRAMUSCULAR; INTRAVENOUS; SUBCUTANEOUS at 14:46

## 2018-08-28 RX ADMIN — DEXAMETHASONE SODIUM PHOSPHATE 4 MG: 4 INJECTION, SOLUTION INTRAMUSCULAR; INTRAVENOUS at 13:08

## 2018-08-28 RX ADMIN — MIDAZOLAM 2 MG: 1 INJECTION INTRAMUSCULAR; INTRAVENOUS at 12:52

## 2018-08-28 RX ADMIN — SODIUM CHLORIDE: 0.9 INJECTION, SOLUTION INTRAVENOUS at 13:17

## 2018-08-28 RX ADMIN — HYDROMORPHONE HYDROCHLORIDE 0.5 MG: 1 INJECTION, SOLUTION INTRAMUSCULAR; INTRAVENOUS; SUBCUTANEOUS at 15:50

## 2018-08-28 RX ADMIN — SODIUM CHLORIDE 125 ML/HR: 0.9 INJECTION, SOLUTION INTRAVENOUS at 10:45

## 2018-08-28 RX ADMIN — HYDROMORPHONE HYDROCHLORIDE 1 MG: 2 INJECTION, SOLUTION INTRAMUSCULAR; INTRAVENOUS; SUBCUTANEOUS at 13:11

## 2018-08-28 RX ADMIN — FENTANYL CITRATE 100 MCG: 50 INJECTION, SOLUTION INTRAMUSCULAR; INTRAVENOUS at 13:00

## 2018-08-28 NOTE — H&P (VIEW-ONLY)
BARIATRIC HISTORY AND PHYSICAL - BARIATRIC SURGERY  Buster Queen 59 y o  female MRN: [id number]  Unit/Bed#:  Encounter: [id number]      HPI:  Buster Queen is a 59 y o  female who presents to review her preoperative workup and see if she is a good candidate to undergo a bariatric procedure  Review of Systems   Constitutional: Negative  HENT: Negative  Eyes: Negative  Respiratory: Negative  Cardiovascular: Negative  Gastrointestinal: Negative  Endocrine: Negative  Genitourinary: Negative  Musculoskeletal: Negative  Skin: Negative  Neurological: Negative  Hematological: Negative  Psychiatric/Behavioral: Negative  Historical Information   Past Medical History:   Diagnosis Date    Allergic rhinitis     last assessed 14, resolved 12/22/15    Anxiety     Arthritis     resolved 12/22/15    Asthma     Cancer ([facility] )     derma fibroid sarcoma protuberance    Depression     "situational"    Environmental and seasonal allergies     "smoke,perfume and mold some of my triggers"    GERD (gastroesophageal reflux disease)     Hiatal hernia     History of cellulitis     Hyperlipidemia     "a little high"    Hypertension     Benign essential     Joint stiffness of knee     Low back pain     Lumbar disc disease     [facility]     Osteoarthritis     Pre-operative laboratory examination     Right knee pain     Shortness of breath     "occas "    Use of cane as ambulatory aid     "for long distances"     Past Surgical History:   Procedure Laterality Date     SECTION      last assessed 14    MOHS SURGERY Right     upper arm    [facility] EGD TRANSORAL BIOPSY SINGLE/MULTIPLE N/A 2018    Procedure: ESOPHAGOGASTRODUODENOSCOPY (EGD) with bx;  Surgeon: Teodoro Dean MD;  Location: [facility];   Service: Bariatrics     Social History   History   Alcohol Use    Yes     Comment: rare     History   Drug Use No     History   Smoking Status    Never Smoker   Smokeless Tobacco    Never Used     Family History: non-contributory    Meds/Allergies   all medications and allergies reviewed  No Known Allergies    Objective     Current Vitals:   Blood Pressure: 128/86 (08/24/18 1442)  Pulse: 86 (08/24/18 1442)  Temperature: 97 8 °F (36 6 °C) (08/24/18 1442)  Temp Source: Tympanic (08/24/18 1442)  Height: 5' 3 5" (161 3 cm) (08/24/18 1442)  Weight - Scale: 122 kg (268 lb) (08/24/18 1442)  Body mass index is 46 73 kg/m²  [unfilled]    Invasive Devices          No matching active lines, drains, or airways          Physical Exam   Constitutional: She is oriented to person, place, and time  She appears well-developed  HENT:   Head: Normocephalic and atraumatic  Eyes: Conjunctivae and EOM are normal    Neck: Normal range of motion  Neck supple  Cardiovascular: Normal rate, regular rhythm, normal heart sounds and intact distal pulses  Pulmonary/Chest: Effort normal and breath sounds normal    Abdominal: Soft  Bowel sounds are normal    Musculoskeletal: Normal range of motion  Neurological: She is alert and oriented to person, place, and time  She has normal reflexes  Skin: Skin is warm and dry  Psychiatric: She has a normal mood and affect  Lab Results: I have personally reviewed pertinent lab results  Imaging: I have personally reviewed pertinent reports  EKG, Pathology, and Other Studies: I have personally reviewed pertinent reports  Code Status: [unfilled]  Advance Directive and Living Will:      Power of :    POLST:      Assessment/Plan:      The patient presented to review the preoperative workup and see if bariatric surgery is appropriate and indicated following the extensive preoperative workup and the enrollment in our weight loss program    Preoperative workup was complete  Results were reviewed with the patient including the blood work results and the endoscopy findings and the biopsy results   We also reviewed the cardiology evaluation  I believe that the patient will be a good candidate for laparoscopic AMISH-EN-Y GASTRIC BYPASS    Patient will need to start the 2 week liquid diet prior to surgery  Risks and benefits explained one more time to the patient  Alternatives to surgery and alternative forms of surgery were also explained  Post-surgical commitment and after care programs were explained  We also discussed that the [employer] robot may be available to us to use on the day of the patient procedure  and that the procedure may be performed robotically  I discussed in details the advantages and disadvantages of this approach including the potential decrease in postoperative pain because of the remote center technology  I also mentioned the lack of strong evidence for  the use of robot in bariatric patients and the potential disadvantage to patients because of the prolonged operative time  Consent was signed  Questions were answered and concerns were addressed  Patient wishes to proceed  As per  [facility] [address] guidelines, I had a discussion with the patient regarding his CODE STATUS in the perioperative period and the patient is level 1 or FULL CODE STATUS

## 2018-08-28 NOTE — ANESTHESIA POSTPROCEDURE EVALUATION
Post-Op Assessment Note      CV Status:  Stable    Mental Status:  Alert and awake    Hydration Status:  Euvolemic    PONV Controlled:  Controlled    Airway Patency:  Patent    Post Op Vitals Reviewed: Yes          Staff: Anesthesiologist           /77 (08/28/18 1830)    Temp 97 5 °F (36 4 °C) (08/28/18 1830)    Pulse 74 (08/28/18 1830)   Resp 18 (08/28/18 1830)    SpO2 93 % (08/28/18 1830)

## 2018-08-28 NOTE — OP NOTE
OPERATIVE REPORT  PATIENT NAME: Neel May    :  1955  MRN: [id number]  Pt Location: [facility]    SURGERY DATE: 2018    Surgeon(s) and Role:     * Reanna Anguiano MD - Primary     * Francesca Lopez MD - Assisting    Preop Diagnosis:  Morbid obesity ([facility] ) [E66 01]  Essential hypertension, benign [I10]  Acute arthritis [M19 90]  Asthmatic bronchitis without complication, unspecified asthma severity, unspecified whether persistent [J45 909]    Post-Op Diagnosis Codes:     * Morbid obesity ([facility] ) [E66 01]     * Essential hypertension, benign [I10]     * Acute arthritis [M19 90]     * Asthmatic bronchitis without complication, unspecified asthma severity, unspecified whether persistent [J45 909]    Procedure(s) (LRB):  LAPAROSCOPIC AMISH-EN-Y GASTRIC BYPASS AND INTRAOPERATIVE EGD (N/A)    Specimen(s):  * No specimens in log *    Estimated Blood Loss:   Minimal    Drains:       Anesthesia Type:   General    Operative Indications: Morbid obesity ([facility] ) [E66 01]  Essential hypertension, benign [I10]  Acute arthritis [M19 90]  Asthmatic bronchitis without complication, unspecified asthma severity, unspecified whether persistent [J45 909]      Operative Findings:  Normal Findings    Complications:   None    Procedure and Technique:  Laparoscopic Amish en Y Gastric Bypass with intraop-EGD   I was present for the entire procedure    Patient Disposition:  hemodynamically stable     No qualified resident available to assist  The presence of an assistant was necessary for camera holding, traction and counter traction and for help with suturing and stapling in addition to performing the intraop-EGD    Indication:   Patient suffers from morbid obesity and associated co-morbidities  and failed to achieve any meaningful or sustainable weight loss and was therefore consented to undergo a laparoscopic gastric bypass  Risks and benefits were explained to the patient, patient consented for the procedure      Description of the procedure: The patient was placed in a supine position  The patient received a dose of IV antibiotics and a dose of subcutaneous heparin prior to the procedure  The patient was induced and intubated under general endotracheal anesthesia  The abdominal wall was prepped and draped under sterile conditions in the usual fashion  After calling a time-out, we started the procedure by making an incision to the left of the midline 6 inches from the xiphoid process, and the abdominal cavity was accessed using an Optiview trocar  The abdominal cavity was first insufflated with CO2 to a pressure of 15 mmHg using a Veress needle and then the abdominal cavity was  accessed using a 12 mm trocar using the Optiview technique The abdominal cavity was inspected and there was no evidence of tumors or lesions  There was no evidence of injury to the bowel or bleeding from the insertion of the Optiview trocar  At that point, two 12-mm trocars were placed under direct visualization on the right side of the abdominal wall and two other 12-mm trocars were placed on the left side of the abdominal wall, all under direct visualization  The procedure was started by lifting the omentum in a cephalad fashion  The ligament of Treitz was identified and then the small bowel was transected with an endo JERSON using a white load  The mesentery of the small bowel was then transected using a harmonic scalpel  After that, the distal small bowel was run for 150 cm in a way to obtain a 150 cm long Margarita limb  At that point, two enterotomies were made in the BP limb and the Margarita limb with a Harmonic scalpel, and the jejunojejunostomy was fashioned using an endo JERSON stapler with a white load  After firing the stapler, the staple line was inspected and there was no evidence of bleeding and the staple line was well formed   The common enterotomy of the jejunojejunostomy was closed in two layers using 2-0 Vicryl running suture for the first layer and  2-0 V LOC in a running fashion for the second layer  The mesenteric defect of the small bowel was approximated using nonabsorbable suture in a figure of eight fashion  At that point, a Brolin suture was placed to approximate the BP limb and the Margarita limb in a way to prevent kinking of the Margarita limb using 2-0 Vicryl suture  At that point, we turned our attention to the upper portion of the abdomen  Enolia Render liver retractor was placed through a stab incision in the subxiphoid space, and the left lobe of the liver was retracted medially  An OG tube was placed in the stomach to decompress the stomach and was then removed  The angle of His was dissected with a combination of sharp dissection and blunt dissection  At that point, the pars flaccida was opened and a gastric pouch was created with serial firings of the endo JERSON stapler with a purple cartridge reinforced with Seamguard  After the formation of the gastric pouch, the staple lines of the pouch and the gastric remnant were inspected and appeared to be well formed and also appeared to be hemostatic  At that point, a 22 OrVil was brought through the mouth and, after making a gastrotomy with electrocautery, the NG tube connected to the OrVil was brought through the pouch gastrotomy and removed from the abdominal cavity through the 12-mm trocar on the left side of the abdominal wall  At that point, the anvil appeared to be well positioned in the pouch  The omentum was then split in half with the harmonic scalpel to avoid tension on the margarita limb  The end of the Margarita limb was opened with a Harmonic scalpel  The 12-mm trocar site on the upper  right side of the abdominal wall was dilated, and the 25 circular stapler was brought through that incision into the abdominal cavity and placed inside the Margarita limb  At that point, the stapler was engaged and connected to the anvil  There was no twisting of the pouch or the Margarita limb    The stapler was fired and then removed from the abdominal cavity  The site of the mesentery of the overhang of the Margarita limb was transected with the Harmonic scalpel, and then the overhang was transected using an endo JERSON stapler with a white load  The gastrojejunostomy anastomosis was then reinforced with 2-0 Vicryl sutures placed at 3 oclock and 9 oclock to prevent any tension on the anastomosis  Georgana Fred space was closed with a figure-of-eight suture using 0 Ethibond suture  At that point, a bowel clamp was placed around the Margarita limb and an upper endoscopy was performed to check for hemostasis and airtightness  When the endoscopy was performed, the staple line of the pouch and the gastrojejunostomy anastomosis appeared to be hemostatic  At that point, the patient was placed in a supine position  The abdominal cavity was filled with saline and then the pouch and the Margarita limb were distended with air and, at that point, we did not visualize any air bubbles suggestive of air leak  The bowel clamp was then removed  The gastrojejunostomy was covered with an omental flap that was secured in place with an absorbable suture in a simple fashion  The small bowel segment was removed from the abdominal cavity through the right upper trocar site using an endo-catch bag  The [facility] liver retractor was removed  sponge count was completed and was correct  The right upper quadrant incision was irrigated with saline  The right upper quadrant trocar incision and the midline incision were both closed with 1-0 Vicryl in a simple fashion  All the skin edges of the trocar sites were approximated with 4-0 Monocryl in a subcuticular inverted fashion  The patient was extubated and transferred to the PACU in stable condition           SIGNATURE: Arnoldo Warner MD  DATE: August 28, 2018  TIME: 2:47 PM

## 2018-08-28 NOTE — PLAN OF CARE
DISCHARGE PLANNING     Discharge to home or other facility with appropriate resources Progressing        INFECTION - ADULT     Absence or prevention of progression during hospitalization Progressing        Knowledge Deficit     Patient/family/caregiver demonstrates understanding of disease process, treatment plan, medications, and discharge instructions Progressing        PAIN - ADULT     Verbalizes/displays adequate comfort level or baseline comfort level Progressing        Potential for Falls     Patient will remain free of falls Progressing        SKIN/TISSUE INTEGRITY - ADULT     Incision(s), wounds(s) or drain site(s) healing without S/S of infection Progressing

## 2018-08-29 VITALS
TEMPERATURE: 98.1 F | WEIGHT: 262.2 LBS | HEART RATE: 101 BPM | RESPIRATION RATE: 18 BRPM | HEIGHT: 64 IN | OXYGEN SATURATION: 92 % | SYSTOLIC BLOOD PRESSURE: 141 MMHG | DIASTOLIC BLOOD PRESSURE: 78 MMHG | BODY MASS INDEX: 44.76 KG/M2

## 2018-08-29 LAB
ERYTHROCYTE [DISTWIDTH] IN BLOOD BY AUTOMATED COUNT: 13.8 % (ref 11.6–15.1)
HCT VFR BLD AUTO: 38.3 % (ref 34.8–46.1)
HGB BLD-MCNC: 12.2 G/DL (ref 11.5–15.4)
MCH RBC QN AUTO: 30.9 PG (ref 26.8–34.3)
MCHC RBC AUTO-ENTMCNC: 31.9 G/DL (ref 31.4–37.4)
MCV RBC AUTO: 97 FL (ref 82–98)
PLATELET # BLD AUTO: 257 THOUSANDS/UL (ref 149–390)
PMV BLD AUTO: 9.6 FL (ref 8.9–12.7)
RBC # BLD AUTO: 3.95 MILLION/UL (ref 3.81–5.12)
WBC # BLD AUTO: 12.51 THOUSAND/UL (ref 4.31–10.16)

## 2018-08-29 PROCEDURE — 99024 POSTOP FOLLOW-UP VISIT: CPT | Performed by: SURGERY

## 2018-08-29 PROCEDURE — C9113 INJ PANTOPRAZOLE SODIUM, VIA: HCPCS | Performed by: SURGERY

## 2018-08-29 PROCEDURE — 85027 COMPLETE CBC AUTOMATED: CPT | Performed by: SURGERY

## 2018-08-29 RX ORDER — LOSARTAN POTASSIUM 50 MG/1
100 TABLET ORAL DAILY
Status: DISCONTINUED | OUTPATIENT
Start: 2018-08-29 | End: 2018-08-29 | Stop reason: HOSPADM

## 2018-08-29 RX ADMIN — ACETAMINOPHEN 650 MG: 160 SUSPENSION ORAL at 08:41

## 2018-08-29 RX ADMIN — PANTOPRAZOLE SODIUM 40 MG: 40 INJECTION, POWDER, FOR SOLUTION INTRAVENOUS at 08:42

## 2018-08-29 RX ADMIN — SODIUM CHLORIDE, SODIUM LACTATE, POTASSIUM CHLORIDE, AND CALCIUM CHLORIDE 125 ML/HR: .6; .31; .03; .02 INJECTION, SOLUTION INTRAVENOUS at 00:23

## 2018-08-29 RX ADMIN — ACETAMINOPHEN 650 MG: 160 SUSPENSION ORAL at 03:49

## 2018-08-29 RX ADMIN — CEFAZOLIN SODIUM 2000 MG: 2 SOLUTION INTRAVENOUS at 05:16

## 2018-08-29 RX ADMIN — OXYCODONE HYDROCHLORIDE 10 MG: 5 SOLUTION ORAL at 03:48

## 2018-08-29 RX ADMIN — OXYCODONE HYDROCHLORIDE 10 MG: 5 SOLUTION ORAL at 08:42

## 2018-08-29 RX ADMIN — OXYCODONE HYDROCHLORIDE 10 MG: 5 SOLUTION ORAL at 13:43

## 2018-08-29 RX ADMIN — LOSARTAN POTASSIUM 100 MG: 50 TABLET, FILM COATED ORAL at 10:36

## 2018-08-29 RX ADMIN — ACETAMINOPHEN 650 MG: 160 SUSPENSION ORAL at 13:42

## 2018-08-29 NOTE — PLAN OF CARE
DISCHARGE PLANNING     Discharge to home or other facility with appropriate resources Progressing        INFECTION - ADULT     Absence or prevention of progression during hospitalization Progressing        Knowledge Deficit     Patient/family/caregiver demonstrates understanding of disease process, treatment plan, medications, and discharge instructions Progressing        PAIN - ADULT     Verbalizes/displays adequate comfort level or baseline comfort level Progressing        Potential for Falls     Patient will remain free of falls Progressing        Prexisting or High Potential for Compromised Skin Integrity     Skin integrity is maintained or improved Progressing        SKIN/TISSUE INTEGRITY - ADULT     Incision(s), wounds(s) or drain site(s) healing without S/S of infection Progressing

## 2018-08-29 NOTE — CASE MANAGEMENT
Initial Clinical Review    Age/Sex: 59 y o  female    Surgery Date:    8/28/2018    Procedure: Preop Diagnosis:  Morbid obesity ([facility] ) [E66 01]  Essential hypertension, benign [I10]  Acute arthritis [M19 90]  Asthmatic bronchitis without complication, unspecified asthma severity, unspecified whether persistent [J45 909]     Post-Op Diagnosis Codes:     * Morbid obesity ([facility] ) [E66 01]     * Essential hypertension, benign [I10]     * Acute arthritis [M19 90]     * Asthmatic bronchitis without complication, unspecified asthma severity, unspecified whether persistent [J45 909]     Procedure(s) (LRB):  LAPAROSCOPIC AMISH-EN-Y GASTRIC BYPASS AND INTRAOPERATIVE EGD (N/A)    Anesthesia:   general    Admission Orders: Date/Time/Statement: 8/28/18 @ 1525     Orders Placed This Encounter   Procedures    Inpatient Admission     Standing Status:   Standing     Number of Occurrences:   1     Order Specific Question:   Admitting Physician     Answer:   YAZAN Ernandez [[id number]]     Order Specific Question:   Level of Care     Answer:   Med Surg [16]     Order Specific Question:   Bed Type     Answer:   Bariatric [1]     Order Specific Question:   Estimated length of stay     Answer:   More than 2 Midnights     Order Specific Question:   Certification     Answer:   I certify that inpatient services are medically necessary for this patient for a duration of greater than two midnights  See H&P and MD Progress Notes for additional information about the patient's course of treatment         Vital Signs: /66 (BP Location: Right arm)   Pulse 82   Temp 98 1 °F (36 7 °C) (Temporal)   Resp 18   Ht 5' 3 5" (1 613 m)   Wt 119 kg (262 lb 3 2 oz)   SpO2 91%   BMI 45 72 kg/m²     Diet:        Diet Orders            Start     Ordered    08/28/18 1655  Room Service  Once     Question:  Type of Service  Answer:  Room Service-Appropriate    08/28/18 1655    08/28/18 1522  Diet Bariatric; Bariatric Clear Liquid  Diet effective now     Question Answer Comment   Diet Type Bariatric    Bariatric Bariatric Clear Liquid    RD to adjust diet per protocol? No        08/28/18 1525          Mobility:   As  delaney    DVT Prophylaxis:    SCD'S    Pain Control:   Pain Medications             acetaminophen (TYLENOL) 500 mg tablet Take 500 mg by mouth every 6 (six) hours as needed for mild pain      IV  protonix  Daily  Cozaar   Daily  IV  75/hr  IV  MSO4  Prn  ( x1  8/28)    pOST OP PROGRESS  NOTE   8/29  Assessment  23-year-old female POD 1 s/p laparoscopic gastric bypass  Patient is afebrile hemodynamically stable, hemoglobin stable, tolerating clear liquids and ambulating  Patient looks well and is likely ready for discharge in the afternoon     Plan  - discharge later in the afternoon   -decrease IV fluids to 75 an hour  - encourage incentive spirometry, ambulation, clear liquids    Thank you,  [address]  Utilization Review Department  Phone: [phone]; Fax [phone]  ATTENTION: Please call with any questions or concerns to [phone]  and carefully follow the prompts so that you are directed to the right person  Send all requests for admission clinical reviews, approved or denied determinations and any other requests to fax [phone]   All voicemails are confidential

## 2018-08-29 NOTE — NURSING NOTE
Patient discharged to home verbal understanding instructions aware to get prescriptions from pharmacy escort via wheelchair with daughter and staff

## 2018-08-29 NOTE — DISCHARGE INSTR - AVS FIRST PAGE
Stay hydrated, drink cups of water every 15 mins  Mild nausea is ok as long as you can drink fluids  Take your omeprazole daily  Crush your meds and mix with liquid  Follow up with Dr Lamar Cortez  and your PCP within the next week

## 2018-08-29 NOTE — PROGRESS NOTES
I agree with the previous nursing assessment  Patient is pleasant, comfortable in bed, and rings appropriately   No pain or nausea, voiding and ambulating

## 2018-08-29 NOTE — DISCHARGE SUMMARY
Discharge Summary - Julio C Dueñas 59 y o  female MRN: [id number]    Unit/Bed#: E5 -01 Encounter: [id number]    Admission Date:   Admission Orders     Ordered        08/28/18 1525  Inpatient Admission  Once               Admitting Diagnosis: Morbid obesity ([address] ) [E66 01]  Essential hypertension, benign [I10]  Acute arthritis [M19 90]  Asthmatic bronchitis without complication, unspecified asthma severity, unspecified whether persistent [J45 909]    HPI:   Patient is a 58-year-old female admitted electively for laparoscopic gastric bypass    Procedures Performed:   Laparoscopic gastric bypass  Intraoperative EGD    Summary of Hospital Course:   Patient tolerated surgery well without complications  There were no events in the night after surgery  In the morning of postoperative day 1, the patient reported mild incisional pain and nausea were well controlled with medications  Patient was tolerating liquid diet, ambulating and voiding independently  The patient was deemed ready for discharge home  Significant Findings, Care, Treatment and Services Provided:   Pain medication and antiemetics    Complications:   None    Discharge Diagnosis:   Morbid obesity, status post gastric surgery    Resolved Problems  Date Reviewed: 8/29/2018    None          Condition at Discharge: good         Discharge instructions/Information to patient and family:   See after visit summary for information provided to patient and family  Provisions for Follow-Up Care:  See after visit summary for information related to follow-up care and any pertinent home health orders  PCP: Orlando Geller DO    Disposition: Home    Planned Readmission: No    Discharge Statement   I spent 20 minutes discharging the patient  This time was spent on the day of discharge  I had direct contact with the patient on the day of discharge  Additional documentation is required if more than 30 minutes were spent on discharge       Discharge Medications:  See after visit summary for reconciled discharge medications provided to patient and family

## 2018-08-29 NOTE — PROGRESS NOTES
Bariatric Surgery Progress Note    Subjective  No adverse events  Reports only mild nausea an incisional pain   Advancing PO hydration, ambulation, spirometry  Pain/nausea control adequate  Objective  Vitals:    08/28/18 1800 08/28/18 1830 08/28/18 2337 08/29/18 0725   BP: 142/77 144/77 145/78 114/88   BP Location: Right arm Right arm  Right arm   Pulse: 70 74 75 95   Resp: 16 18 18 18   Temp: 97 5 °F (36 4 °C) 97 5 °F (36 4 °C) 97 8 °F (36 6 °C) 98 2 °F (36 8 °C)   TempSrc: Tympanic Temporal  Temporal   SpO2: 93% 93% 94% 91%   Weight:       Height:         NAD, alert  Normal inspiratory effort  Abdomen soft, non-distended, appropriately tender  Incisions c/d/i      Labs  Leukocytosis at 12 5, expected from surgery  Hemoglobin stable at 12 2    Assessment  59-year-old female POD 1 s/p laparoscopic gastric bypass  Patient is afebrile hemodynamically stable, hemoglobin stable, tolerating clear liquids and ambulating    Patient looks well and is likely ready for discharge in the afternoon    Plan  - discharge later in the afternoon   -decrease IV fluids to 75 an hour  - encourage incentive spirometry, ambulation, clear liquids

## 2018-08-29 NOTE — DISCHARGE INSTRUCTIONS
Bariatric/Weight Loss Surgery  Hospital Discharge Instructions  1  ACTIVITY:  a  Progress as feels comfortable - a good rule is:  if you are doing something and it begins to hurt, stop doing the activity  Walk at least 3 times per day at home  b  Robert Dupont may walk stairs if you do so slowly  c  You may shower 48 hours after surgery  d  Use your incentive spirometer 10 times per hour while awake for 1 week  e  No driving if you are still taking certain prescription pain medication  Examples of such medication are Percocet, Darvocet, Oxycodone, Tylenol #3, and Tylenol with Codeine  Follow your pharmacists orders  2  DIET  a  Stay on a liquid diet for 7 days after your surgery date, sipping slowly  Refer to your manual for examples of choices  Remember to keep your liquids sugar free or low calorie  You may have protein drinks  Make sure to drink 48 to 64 ounces per day of fluids  b  On the 8th day after surgery, start a pureed/blenderized diet  (As an example, if you had surgery on a Monday, you can start your pureed/blenderized diet the following Monday)  Start 3 meals per day, breakfast, lunch and dinner, each meal to consist of 30 minutes without drinking, 20 minutes eating food and then another 60 minutes without drinking  Follow the directions in your manual under Diet Progression, section 3  You will be on a pureed/blenderized diet for 3 to 5 days  c  Once you start the pureed/blenderized diet, remember to keep hydrated by drinking water or low calorie liquids between meal times (48 to 64 ounces per day) following the 30/60 minute rule  d  Robert Dupont may start a soft diet once you get approval from your surgeon at your first follow up visit  3  MEDICATIONS:  a  Start vitamins and minerals when you get home  b  Pain and Anti-acid Medication as per prescription  c  Other medications as indicated on the Physician Patient Discharge Instructions form given to you at the time of discharge    d  Make sure that you are splitting your pill or tablet medications in halves or fourths or even crushing them before you take them  Capsules should be opened and mixed with water or jello  You need to do this for at least 2 to 4 weeks after surgery  Eventually you will be able to take your medications the regular way as they were prescribed  Ask your nurse prior to discharge about your medications  e  Robert Dupont will need to consult with your Family Doctor in regards to all your prescribed medication, particularly those for blood pressure and diabetes  As you lose weight, medical conditions may change, requiring an alteration or elimination of the drug dose  4  INCISION CARE  a  You may shower and get incisions wet 2 days after surgery  b  If you have a drain, empty the drain as the nurses instructed  5  FOLLOW-UP APPOINTMENT should be made for one week after discharge  Call surgeons office at [phone] to schedule an appointment      6  CALL YOUR DOCTOR FOR:  pain not controlled by pain medications, a temperature greater than 101 5° F, any increase or change in drainage or redness from any incision, any vomiting or inability to keep liquids down, shortness of breath, shoulder pain, or bleeding

## 2018-08-30 ENCOUNTER — TELEPHONE (OUTPATIENT)
Dept: BARIATRICS | Facility: CLINIC | Age: 63
End: 2018-08-30

## 2018-08-31 ENCOUNTER — TELEPHONE (OUTPATIENT)
Dept: BARIATRICS | Facility: CLINIC | Age: 63
End: 2018-08-31

## 2018-08-31 DIAGNOSIS — E66.01 MORBID (SEVERE) OBESITY DUE TO EXCESS CALORIES (HCC): Primary | ICD-10-CM

## 2018-08-31 RX ORDER — OMEPRAZOLE 20 MG/1
20 CAPSULE, DELAYED RELEASE ORAL DAILY
Qty: 90 CAPSULE | Refills: 1 | Status: SHIPPED | OUTPATIENT
Start: 2018-08-31 | End: 2019-02-24 | Stop reason: SDUPTHER

## 2018-08-31 NOTE — TELEPHONE ENCOUNTER
Post op follow up phone call completed  Pt is sipping liquids, using IS as instructed, reinforced importance of using IS to help prevent pneumonia  Ambulating about home without difficulty  Pain controlled with analgesia  Reaffirmed examples of liquid diet over the next week  Pt stated understanding about discharge instructions and medication adjustments  Follow up appt with surgeon scheduled for next week  Instructed to call with any additional questions or concerns

## 2018-09-07 ENCOUNTER — OFFICE VISIT (OUTPATIENT)
Dept: BARIATRICS | Facility: CLINIC | Age: 63
End: 2018-09-07

## 2018-09-07 VITALS
BODY MASS INDEX: 43.11 KG/M2 | HEIGHT: 64 IN | WEIGHT: 252.5 LBS | DIASTOLIC BLOOD PRESSURE: 86 MMHG | HEART RATE: 80 BPM | TEMPERATURE: 98.4 F | SYSTOLIC BLOOD PRESSURE: 124 MMHG

## 2018-09-07 DIAGNOSIS — E66.01 OBESITY, CLASS III, BMI 40-49.9 (MORBID OBESITY) (HCC): Primary | ICD-10-CM

## 2018-09-07 DIAGNOSIS — E66.01 MORBID OBESITY WITH BODY MASS INDEX (BMI) OF 40.0 TO 49.9 (HCC): Primary | ICD-10-CM

## 2018-09-07 PROCEDURE — 99024 POSTOP FOLLOW-UP VISIT: CPT | Performed by: SURGERY

## 2018-09-07 PROCEDURE — RECHECK: Performed by: DIETITIAN, REGISTERED

## 2018-09-07 NOTE — LETTER
2018     Brandon Patel, [facility]    Patient: Ana Webber   YOB: 1955   Date of Visit: 2018       Dear Dr Melissa Lyon: Thank you for referring Ana Webber to me for evaluation  Below are my notes for this consultation  If you have questions, please do not hesitate to call me  I look forward to following your patient along with you  Sincerely,        Ann Marie Rock MD        CC: No Recipients  Ann Marie Rock MD  2018  1:52 PM  Sign at close encounter  [address] [facility] [address] 59 y o  female MRN: [id number]  Unit/Bed#:  Encounter: [id number]      HPI:  Ana Webber is a 59 y o  female who presents for the 1st postoperative visit following a laparoscopic Margarita-en-Y gastric bypass      Review of Systems    Historical Information   Past Medical History:   Diagnosis Date    Allergic rhinitis     last assessed 14, resolved 12/22/15    Anxiety     Arthritis     resolved 12/22/15    Asthma     Cancer ([facility] )     derma fibroid sarcoma protuberance    Depression     "situational"    Environmental and seasonal allergies     "smoke,perfume and mold some of my triggers"    GERD (gastroesophageal reflux disease)     Hiatal hernia     History of cellulitis     Hyperlipidemia     "a little high"    Hypertension     Benign essential     Joint stiffness of knee     Low back pain     Lumbar disc disease     Morbid obesity ([address] )     Osteoarthritis     Postgastrectomy malabsorption     Pre-operative laboratory examination     Right knee pain     Shortness of breath     "occas "    Use of cane as ambulatory aid     "for long distances"     Past Surgical History:   Procedure Laterality Date     SECTION      last assessed 14    MOHS SURGERY Right     upper arm    [place] EGD TRANSORAL BIOPSY SINGLE/MULTIPLE N/A 2018    Procedure: ESOPHAGOGASTRODUODENOSCOPY (EGD) with bx;  Surgeon: Bhumika Go MD;  Location: [facility]; Service: Bariatrics    MS LAP GASTRIC BYPASS/AMISH-EN-Y N/A 8/28/2018    Procedure: LAPAROSCOPIC AMISH-EN-Y GASTRIC BYPASS AND INTRAOPERATIVE EGD;  Surgeon: Bhumika Go MD;  Location: [facility];  Service: Bariatrics     Social History   History   Alcohol Use    Yes     Comment: rare     History   Drug Use No     History   Smoking Status    Never Smoker   Smokeless Tobacco    Never Used     Family History: non-contributory    Meds/Allergies   all medications and allergies reviewed  No Known Allergies    Objective   First Vitals:   @VSFIRST2(5,8,6,7,9,11,14,10:FIRST)@    Current Vitals:   Blood Pressure: 124/86 (09/07/18 1338)  Pulse: 80 (09/07/18 1338)  Temperature: 98 4 °F (36 9 °C) (09/07/18 1338)  Temp Source: Tympanic (09/07/18 1338)  Height: 5' 3 5" (161 3 cm) (09/07/18 1338)  Weight - Scale: 115 kg (252 lb 8 oz) (09/07/18 1338)    [unfilled]    Invasive Devices          No matching active lines, drains, or airways          Physical Exam     Not in acute distress  Abdomen soft non tender non distended  Surgical incisions at trocar sites clean and dry no discharge no erythema no tenderness healing well    Lab Results: I have personally reviewed pertinent lab results  Imaging: I have personally reviewed pertinent reports  EKG, Pathology, and Other Studies: I have personally reviewed pertinent reports  Code Status: [unfilled]  Advance Directive and Living Will:      Power of :    POLST:      Assessment/Plan :      Patient is presenting for the first postoperative visit, patient hospital stay was uneventful without any complications, patient is doing well, has no complaints, is taking vitamins as instructed, currently tolerating the blenderized diet, will advance to soft diet   Patient will also be meeting with our dietician today to review her vitamin and mineral supplements and also go over her diet and emphasize postoperative commitment and compliance  The patient was also instructed to start exercising on a regular basis  However, I recommended no heavy lifting, or weight exercises for another 2 weeks  F/U in 4 weeks  Patient was instructed to call if develops nausea, vomiting, fever or chills

## 2018-09-07 NOTE — PROGRESS NOTES
Weight Management Nutrition Class     Diagnosis: Morbid Obesity    Bariatric Surgeon: Dr Rigo Winters    Surgery: Gastric Bypass Laparoscopic    Class: first post op note    Topics discussed today include:     fluid goals post op, protein goals post op, constipation, chew food well, exercise, PPI use, diet progression, protein supplems, vitamin/mineral supplements, calcium supplements and iron supplements    Patient was able to verbalize basic diet (protein, fluid, vitamin and mineral) recommendations and possible nutrition-related complications  Yes     Reviewed post-operative pureed and soft foods diet with pt  Discussed gradual diet advancement and portion size progression  Discussed adequate hydration, signs and symptoms of dehydration  Discussed recommended post-operative vitamin supplementation and protein supplements  Discussed importance of regular physical activity  Provided pt with contact information for future questions/concerns

## 2018-09-07 NOTE — PROGRESS NOTES
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    Depression     "situational"    Environmental and seasonal allergies     "smoke,perfume and mold some of my triggers"    GERD (gastroesophageal reflux disease)     Hiatal hernia     History of cellulitis     Hyperlipidemia     "a little high"    Hypertension     Benign essential     Joint stiffness of knee     Low back pain     Lumbar disc disease     Morbid obesity ([address] )     Osteoarthritis     Postgastrectomy malabsorption     Pre-operative laboratory examination     Right knee pain     Shortness of breath     "occas "    Use of cane as ambulatory aid     "for long distances"     Past Surgical History:   Procedure Laterality Date     SECTION      last assessed 14    MOHS SURGERY Right     upper arm    ND EGD TRANSORAL BIOPSY SINGLE/MULTIPLE N/A 2018    Procedure: ESOPHAGOGASTRODUODENOSCOPY (EGD) with bx;  Surgeon: Marcela Blair MD;  Location: [facility];   Service: Bariatrics    ND LAP GASTRIC BYPASS/MARGARITA-EN-Y N/A 2018    Procedure: LAPAROSCOPIC MARGARITA-EN-Y GASTRIC BYPASS AND INTRAOPERATIVE EGD;  Surgeon: Marcela Blair MD;  [facility]: [facility];  Service: Bariatrics     Social History   History   Alcohol Use    Yes     Comment: rare     History   Drug Use No     History   Smoking Status    Never Smoker   Smokeless Tobacco    Never Used     Family History: non-contributory    Meds/Allergies   all medications and allergies reviewed  No Known Allergies    Objective   First Vitals:   @VSFIRST2(5,8,6,7,9,11,14,10:FIRST)@    Current Vitals:   Blood Pressure: 124/86 (09/07/18 1338)  Pulse: 80 (09/07/18 1338)  Temperature: 98 4 °F (36 9 °C) (09/07/18 1338)  Temp Source: Tympanic (09/07/18 1338)  Height: 5' 3 5" (161 3 cm) (09/07/18 1338)  Weight - Scale: 115 kg (252 lb 8 oz) (09/07/18 1338)    [unfilled]    Invasive Devices          No matching active lines, drains, or airways          Physical Exam     Not in acute distress  Abdomen soft non tender non distended  Surgical incisions at trocar sites clean and dry no discharge no erythema no tenderness healing well    Lab Results: I have personally reviewed pertinent lab results  Imaging: I have personally reviewed pertinent reports  EKG, Pathology, and Other Studies: I have personally reviewed pertinent reports  Code Status: [unfilled]  Advance Directive and Living Will:      Power of :    POLST:      Assessment/Plan :      Patient is presenting for the first postoperative visit, patient hospital stay was uneventful without any complications, patient is doing well, has no complaints, is taking vitamins as instructed, currently tolerating the blenderized diet, will advance to soft diet  Patient will also be meeting with our dietician today to review her vitamin and mineral supplements and also go over her diet and emphasize postoperative commitment and compliance  The patient was also instructed to start exercising on a regular basis  However, I recommended no heavy lifting, or weight exercises for another 2 weeks  F/U in 4 weeks  Patient was instructed to call if develops nausea, vomiting, fever or chills

## 2018-09-10 ENCOUNTER — OFFICE VISIT (OUTPATIENT)
Dept: FAMILY MEDICINE CLINIC | Facility: CLINIC | Age: 63
End: 2018-09-10
Payer: COMMERCIAL

## 2018-09-10 VITALS
DIASTOLIC BLOOD PRESSURE: 70 MMHG | BODY MASS INDEX: 43.59 KG/M2 | HEART RATE: 95 BPM | WEIGHT: 250 LBS | SYSTOLIC BLOOD PRESSURE: 116 MMHG | RESPIRATION RATE: 16 BRPM | OXYGEN SATURATION: 99 % | TEMPERATURE: 97 F

## 2018-09-10 DIAGNOSIS — Z12.11 SCREENING FOR COLON CANCER: ICD-10-CM

## 2018-09-10 DIAGNOSIS — Z12.11 COLON CANCER SCREENING: ICD-10-CM

## 2018-09-10 DIAGNOSIS — J30.9 ALLERGIC RHINITIS, UNSPECIFIED SEASONALITY, UNSPECIFIED TRIGGER: ICD-10-CM

## 2018-09-10 DIAGNOSIS — Z98.84 STATUS POST BARIATRIC SURGERY: Primary | ICD-10-CM

## 2018-09-10 DIAGNOSIS — Z12.39 BREAST CANCER SCREENING: ICD-10-CM

## 2018-09-10 PROCEDURE — 99214 OFFICE O/P EST MOD 30 MIN: CPT | Performed by: FAMILY MEDICINE

## 2018-09-10 NOTE — PROGRESS NOTES
SUBJECTIVE:  9/10/2018 Heather Spencer MD  I have identified this patient to be Galo Adan,  -1955, MR# [id number]    Chief complaint: 2 week post op for Margarita n Y  She had the surgery to reduce damage in her R knee and to improve her mobility  On medication of HTN  On soft diet now X 4 - 6 weeks, then will be on modified regular diet  A little issue with hypersense of smell  Her typical diet now is:  Scrambled eggs, cottage cheese, applesauce, chicken broth, 1 - 2 protein drinks per day  Keeping hydrated  Allowed ground beef or turkey, as long as soft and moist    On bariatric MVI and calcium  Feels ok with some tiredness, some frustration  Easily feels full  Going back to work Friday   for [employer]  Exercise: just starting doing some light calisthenics  R knee (traumatic patella-femoral syndrome)    Sleep: doesn't have sleep apnea  History of Present Illness:    Patient Active Problem List   Diagnosis    Morbid obesity with body mass index (BMI) of 40 0 to 49 9 ([facility])    Benign essential hypertension    Depression with anxiety    Pre-op exam    Family history of heart disease    Morbid obesity ([facility] )    Essential hypertension, benign    Acute arthritis    Asthmatic bronchitis without complication       EXAM:  /70   Pulse 95   Temp (!) 97 °F (36 1 °C)   Resp 16   Wt 113 kg (250 lb)   SpO2 99%   BMI 43 59 kg/m²   The patient appears well, in no apparent distress  Alert and oriented times three, pleasant and cooperative  Vital signs are as noted by the nurse      Eyes: well perfused conjunctiva, not clinically pale, not jaundiced  Ears: normal non hyperemic left tympanic membranes with a little fluid   External ear canals clear, no otalgia  Nose: nares normal, no rhinorrhea  Oropharynx: No tonsillar enlargement or exudates, moist mucous membranes  Neck: supple, trachea in the midline and no cervical lymphadenopathy  Lungs: clear to auscultation and percussion  Heart: Regular rate and rhythm, no gallop, no murmurs  Abdomen: soft, non-tender or distended, no guarding, rebound, normal bowel sounds, no masses  Surgical laparoscopy scars healing well  Skin: good capillary refill,no rashes noted  Extremities: Good power and tone all extremities bilaterally  No pedal edema      ASSESSMENT/PLAN:    1  Breast cancer screening    - Mammo screening bilateral w cad; Future    2  Colon cancer screening    - Ambulatory referral to Gastroenterology; Future    3  Allergic rhinitis, unspecified seasonality, unspecified trigger    - fluticasone (VERAMYST) 27 5 MCG/SPRAY nasal spray; 2 sprays into each nostril daily  Dispense: 10 g; Refill: 0    4   Status post bariatric surgery  Healng well, emotionally adjusting reasonably well - discussed setting aside some time each day for emotional processing, healthy diet and exercise principles    F/u 3 months

## 2018-10-10 ENCOUNTER — CLINICAL SUPPORT (OUTPATIENT)
Dept: BARIATRICS | Facility: CLINIC | Age: 63
End: 2018-10-10

## 2018-10-10 VITALS — HEIGHT: 64 IN | WEIGHT: 244.4 LBS | BODY MASS INDEX: 41.73 KG/M2

## 2018-10-10 DIAGNOSIS — Z98.84 BARIATRIC SURGERY STATUS: Primary | ICD-10-CM

## 2018-10-10 DIAGNOSIS — E66.01 MORBID (SEVERE) OBESITY DUE TO EXCESS CALORIES (HCC): ICD-10-CM

## 2018-10-10 PROCEDURE — RECHECK: Performed by: DIETITIAN, REGISTERED

## 2018-10-10 NOTE — PROGRESS NOTES
Weight Management Nutrition Class     Diagnosis: Obesity    Bariatric Surgeon: Dr Fredo Avila    Surgery: Gastric Bypass Laparoscopic    Class: 5 week post op     Topics discussed today include:     fluid goals post op, protein goals post op, constipation, chew food well, exercise, avoidance of alcohol, PPI use, diet progression, protein supplems, vitamin/mineral supplements and calcium supplements    Patient was able to verbalize basic diet (protein, fluid, vitamin and mineral) recommendations and possible nutrition-related complications   Yes

## 2018-11-27 DIAGNOSIS — F41.9 ANXIETY: ICD-10-CM

## 2018-11-30 RX ORDER — LORAZEPAM 0.5 MG/1
TABLET ORAL
Qty: 60 TABLET | Refills: 0 | Status: SHIPPED | OUTPATIENT
Start: 2018-11-30 | End: 2019-05-28 | Stop reason: SDUPTHER

## 2018-12-13 ENCOUNTER — OFFICE VISIT (OUTPATIENT)
Dept: BARIATRICS | Facility: CLINIC | Age: 63
End: 2018-12-13
Payer: COMMERCIAL

## 2018-12-13 VITALS
SYSTOLIC BLOOD PRESSURE: 116 MMHG | HEIGHT: 63 IN | WEIGHT: 218.5 LBS | DIASTOLIC BLOOD PRESSURE: 84 MMHG | TEMPERATURE: 97 F | HEART RATE: 87 BPM | BODY MASS INDEX: 38.71 KG/M2

## 2018-12-13 DIAGNOSIS — I10 ESSENTIAL HYPERTENSION, BENIGN: ICD-10-CM

## 2018-12-13 DIAGNOSIS — K91.2 POSTSURGICAL MALABSORPTION: ICD-10-CM

## 2018-12-13 DIAGNOSIS — Z98.84 BARIATRIC SURGERY STATUS: Primary | ICD-10-CM

## 2018-12-13 PROBLEM — E66.9 OBESITY, CLASS II, BMI 35-39.9: Status: ACTIVE | Noted: 2018-12-13

## 2018-12-13 PROBLEM — J30.9 ALLERGIC RHINITIS: Status: RESOLVED | Noted: 2018-09-10 | Resolved: 2018-12-13

## 2018-12-13 PROBLEM — E66.01 MORBID OBESITY WITH BODY MASS INDEX (BMI) OF 40.0 TO 49.9 (HCC): Status: RESOLVED | Noted: 2018-04-05 | Resolved: 2018-12-13

## 2018-12-13 PROBLEM — E66.812 OBESITY, CLASS II, BMI 35-39.9: Status: ACTIVE | Noted: 2018-12-13

## 2018-12-13 PROBLEM — E66.01 MORBID OBESITY (HCC): Status: RESOLVED | Noted: 2018-05-11 | Resolved: 2018-12-13

## 2018-12-13 PROCEDURE — 99214 OFFICE O/P EST MOD 30 MIN: CPT | Performed by: PHYSICIAN ASSISTANT

## 2018-12-13 NOTE — ASSESSMENT & PLAN NOTE
Advised that with continued weight loss blood pressure can come done  Advised on symptoms of hypotension and if this occurs to follow-up with PCP for further management since blood pressure medications may need to be adjusted at that time      Controlled on medication/followed by PCP

## 2018-12-13 NOTE — ASSESSMENT & PLAN NOTE
-s/p Margarita-En-Y Gastric Bypass with Dr Denisha Arevalo on 8/28/2018  Overall doing Well  Initial: 291 lb  Current: 218 5 lb  EWL: 49% average weight loss and better than expected considering higher starting bmi  Chiki: Current  Current BMI is Body mass index is 39 33 kg/m²      Tolerating a regular diet-yes  Eating at least 60 grams of protein per day-yes  Following 30/60 minute rule with liquids-yes  Drinking at least 64 ounces of fluid per day-yes  Drinking carbonated beverages-no  Sufficient exercise-yes-kettle ball and becoming more active-plans to join the gym to increase as tolerated with baseline knee pains-encouraged exercise as tolerated  Using NSAIDs regularly-no  Using nicotine-no  Using alcohol-no

## 2018-12-13 NOTE — ASSESSMENT & PLAN NOTE
Malabsorption- patient is at risk for malabsorption of vitamins/minerals secondary to malabsorption from her procedure and restriction of intakes  Reviewed current supplements and advised on same    She is taking bariastic 2 per day  And 2 calcium per day-reviewed with our RD who gave approval

## 2018-12-13 NOTE — PROGRESS NOTES
Assessment/Plan:    Bariatric surgery status  -s/p Margarita-En-Y Gastric Bypass with Dr Annabelle Garcia on 8/28/2018  Overall doing Well  Initial: 291 lb  Current: 218 5 lb  EWL: 49% average weight loss and better than expected considering higher starting bmi  Chiki: Current  Current BMI is Body mass index is 39 33 kg/m²  Tolerating a regular diet-yes  Eating at least 60 grams of protein per day-yes  Following 30/60 minute rule with liquids-yes  Drinking at least 64 ounces of fluid per day-yes  Drinking carbonated beverages-no  Sufficient exercise-yes-kettle ball and becoming more active-plans to join the gym to increase as tolerated with baseline knee pains-encouraged exercise as tolerated  Using NSAIDs regularly-no  Using nicotine-no  Using alcohol-no      Postsurgical malabsorption  Malabsorption- patient is at risk for malabsorption of vitamins/minerals secondary to malabsorption from her procedure and restriction of intakes  Reviewed current supplements and advised on same    She is taking bariastic 2 per day  And 2 calcium per day-reviewed with our RD who gave approval    Essential hypertension, benign  Advised that with continued weight loss blood pressure can come done  Advised on symptoms of hypotension and if this occurs to follow-up with PCP for further management since blood pressure medications may need to be adjusted at that time  Controlled on medication/followed by PCP    Obesity, Class II, BMI 35-39 9  Improved from morbid obesity       Diagnoses and all orders for this visit:    Bariatric surgery status  -     Copper Level; Future  -     CBC and Platelet; Future  -     Comprehensive metabolic panel; Future  -     Ferritin; Future  -     Folate; Future  -     Lipid panel; Future  -     Iron Saturation %; Future  -     PTH, intact; Future  -     Vitamin A; Future  -     Vitamin B1, whole blood; Future  -     Vitamin B12; Future  -     Vitamin D 25 hydroxy;  Future  -     Zinc; Future    Postsurgical malabsorption  -     Copper Level; Future  -     CBC and Platelet; Future  -     Comprehensive metabolic panel; Future  -     Ferritin; Future  -     Folate; Future  -     Lipid panel; Future  -     Iron Saturation %; Future  -     PTH, intact; Future  -     Vitamin A; Future  -     Vitamin B1, whole blood; Future  -     Vitamin B12; Future  -     Vitamin D 25 hydroxy; Future  -     Zinc; Future    Essential hypertension, benign  -     Copper Level; Future  -     CBC and Platelet; Future  -     Comprehensive metabolic panel; Future  -     Ferritin; Future  -     Folate; Future  -     Lipid panel; Future  -     Iron Saturation %; Future  -     PTH, intact; Future  -     Vitamin A; Future  -     Vitamin B1, whole blood; Future  -     Vitamin B12; Future  -     Vitamin D 25 hydroxy; Future  -     Zinc; Future          Subjective:      Patient ID: Tyra Lyons is a 59 y o  female  She is here in routine follow-up  She is tolerating a regular diet  She is taking bariatric vitamins and is doing some exercise and plans to increase this in the next few weeks  She has no complaints today        The following portions of the patient's history were reviewed and updated as appropriate: allergies, current medications, past family history, past medical history, past social history, past surgical history and problem list     Review of Systems   Constitutional: Negative for chills and fever  Unexpected weight change: planned weight loss  Respiratory: Negative for shortness of breath and wheezing  Cardiovascular: Negative for chest pain and palpitations  Gastrointestinal: Negative for abdominal pain, constipation, diarrhea, nausea and vomiting  Psychiatric/Behavioral: Suicidal ideas: no complait of anxiety or depression           Objective:      /84 (BP Location: Left arm, Patient Position: Sitting, Cuff Size: Standard)   Pulse 87   Temp (!) 97 °F (36 1 °C) (Tympanic)   Ht 5' 2 5" (1 588 m)   Wt 99 1 kg (218 lb 8 oz)   BMI 39 33 kg/m²          Physical Exam   Constitutional: She is oriented to person, place, and time  She appears well-developed and well-nourished  HENT:   Mouth/Throat: Oropharynx is clear and moist    Eyes: Conjunctivae are normal  No scleral icterus  Cardiovascular: Normal rate, regular rhythm and normal heart sounds  Pulmonary/Chest: Effort normal and breath sounds normal    Abdominal: Soft  There is no tenderness  No incisional hernias appreciated   Musculoskeletal:   Normal gait   Neurological: She is alert and oriented to person, place, and time  Psychiatric: She has a normal mood and affect  Her behavior is normal  Judgment and thought content normal    Nursing note and vitals reviewed  GOALS: Continued weight loss with good nutrition intakes    Normal vitamin and mineral levels  Exercise as tolerated    BARRIERS: none identified

## 2018-12-13 NOTE — PATIENT INSTRUCTIONS
Follow-up in 3 months  We kindly ask that you arrive 15 minutes before your scheduled appointment time with your provider to allow for our staff to room you and check your vital signs and update your chart  We thank you for your patience at your visit  Follow diet as discussed  Get lab work done prior to next office visit  It is recommended to check with your insurance BEFORE getting labs done to make sure they are covered by your policy  Make sure to HOLD any multivitamins that may contain biotin and any biotin supplements FOR 5 DAYS before any labs since it can affect the results  Follow vitamin  and mineral recommendations as reviewed with you  Exercise as tolerated  Call our office if you have any problems with abdominal pain especially associated with fever, chills, nausea, vomiting or any other concerns  All  Post-bariatric surgery patients should be aware that very small quantities of any alcohol  can cause impairment and it is very possible not to feel the effect  The effect can be in the system for several hours  It is also a stomach irritant  It is advised to AVOID alcohol, Nonsteroidal antiinflammatory drugs (NSAIDS) and nicotine of all forms   Any of these can cause stomach irritation/pain  Recommendation : Most, if not all post-gastric surgery patients would benefit from having a regular counselor for at least 2 years post-op to help with need for multiple life-style changes  If you want to do this and need help finding a regular counselor you can also make a follow-up with our  to help you find one

## 2019-02-24 DIAGNOSIS — E66.01 MORBID (SEVERE) OBESITY DUE TO EXCESS CALORIES (HCC): ICD-10-CM

## 2019-02-24 RX ORDER — OMEPRAZOLE 20 MG/1
CAPSULE, DELAYED RELEASE ORAL
Qty: 90 CAPSULE | Refills: 1 | Status: SHIPPED | OUTPATIENT
Start: 2019-02-24 | End: 2020-12-28

## 2019-04-01 DIAGNOSIS — E66.01 MORBID (SEVERE) OBESITY DUE TO EXCESS CALORIES (HCC): ICD-10-CM

## 2019-04-01 RX ORDER — OMEPRAZOLE 20 MG/1
CAPSULE, DELAYED RELEASE ORAL
Qty: 90 CAPSULE | Refills: 1 | Status: SHIPPED | OUTPATIENT
Start: 2019-04-01 | End: 2019-12-21 | Stop reason: SDUPTHER

## 2019-04-03 ENCOUNTER — OFFICE VISIT (OUTPATIENT)
Dept: BARIATRICS | Facility: CLINIC | Age: 64
End: 2019-04-03
Payer: COMMERCIAL

## 2019-04-03 VITALS
TEMPERATURE: 99.9 F | WEIGHT: 198.2 LBS | SYSTOLIC BLOOD PRESSURE: 100 MMHG | HEIGHT: 64 IN | BODY MASS INDEX: 33.84 KG/M2 | RESPIRATION RATE: 16 BRPM | HEART RATE: 85 BPM | DIASTOLIC BLOOD PRESSURE: 78 MMHG

## 2019-04-03 DIAGNOSIS — Z98.84 S/P GASTRIC BYPASS: Primary | ICD-10-CM

## 2019-04-03 DIAGNOSIS — I10 BENIGN ESSENTIAL HYPERTENSION: ICD-10-CM

## 2019-04-03 DIAGNOSIS — K91.2 POSTSURGICAL MALABSORPTION: ICD-10-CM

## 2019-04-03 PROCEDURE — 99214 OFFICE O/P EST MOD 30 MIN: CPT | Performed by: PHYSICIAN ASSISTANT

## 2019-05-28 DIAGNOSIS — F41.9 ANXIETY: ICD-10-CM

## 2019-05-29 RX ORDER — LORAZEPAM 0.5 MG/1
TABLET ORAL
Qty: 60 TABLET | Refills: 0 | Status: SHIPPED | OUTPATIENT
Start: 2019-05-29 | End: 2019-11-14 | Stop reason: SDUPTHER

## 2019-06-17 LAB
25(OH)D3+25(OH)D2 SERPL-MCNC: 43.9 NG/ML (ref 30–100)
ALBUMIN SERPL-MCNC: 3.9 G/DL (ref 3.6–4.8)
ALBUMIN/GLOB SERPL: 1.7 {RATIO} (ref 1.2–2.2)
ALP SERPL-CCNC: 90 IU/L (ref 39–117)
ALT SERPL-CCNC: 16 IU/L (ref 0–32)
AST SERPL-CCNC: 20 IU/L (ref 0–40)
BASOPHILS # BLD AUTO: 0 X10E3/UL (ref 0–0.2)
BASOPHILS NFR BLD AUTO: 1 %
BILIRUB SERPL-MCNC: 0.3 MG/DL (ref 0–1.2)
BUN SERPL-MCNC: 17 MG/DL (ref 8–27)
BUN/CREAT SERPL: 24 (ref 12–28)
CALCIUM SERPL-MCNC: 9.2 MG/DL (ref 8.7–10.3)
CHLORIDE SERPL-SCNC: 105 MMOL/L (ref 96–106)
CO2 SERPL-SCNC: 26 MMOL/L (ref 20–29)
COPPER SERPL-MCNC: 119 UG/DL (ref 72–166)
CREAT SERPL-MCNC: 0.7 MG/DL (ref 0.57–1)
EOSINOPHIL # BLD AUTO: 0.2 X10E3/UL (ref 0–0.4)
EOSINOPHIL NFR BLD AUTO: 4 %
ERYTHROCYTE [DISTWIDTH] IN BLOOD BY AUTOMATED COUNT: 13.4 % (ref 12.3–15.4)
FERRITIN SERPL-MCNC: 154 NG/ML (ref 15–150)
FOLATE SERPL-MCNC: 18.1 NG/ML
GLOBULIN SER-MCNC: 2.3 G/DL (ref 1.5–4.5)
GLUCOSE SERPL-MCNC: 89 MG/DL (ref 65–99)
HCT VFR BLD AUTO: 37.4 % (ref 34–46.6)
HGB BLD-MCNC: 12.4 G/DL (ref 11.1–15.9)
IMM GRANULOCYTES # BLD: 0 X10E3/UL (ref 0–0.1)
IMM GRANULOCYTES NFR BLD: 0 %
IRON SATN MFR SERPL: 30 % (ref 15–55)
IRON SERPL-MCNC: 76 UG/DL (ref 27–139)
LABCORP COMMENT: NORMAL
LYMPHOCYTES # BLD AUTO: 1.5 X10E3/UL (ref 0.7–3.1)
LYMPHOCYTES NFR BLD AUTO: 34 %
MCH RBC QN AUTO: 31 PG (ref 26.6–33)
MCHC RBC AUTO-ENTMCNC: 33.2 G/DL (ref 31.5–35.7)
MCV RBC AUTO: 94 FL (ref 79–97)
MONOCYTES # BLD AUTO: 0.3 X10E3/UL (ref 0.1–0.9)
MONOCYTES NFR BLD AUTO: 6 %
NEUTROPHILS # BLD AUTO: 2.4 X10E3/UL (ref 1.4–7)
NEUTROPHILS NFR BLD AUTO: 55 %
PLATELET # BLD AUTO: 222 X10E3/UL (ref 150–450)
POTASSIUM SERPL-SCNC: 4.6 MMOL/L (ref 3.5–5.2)
PROT SERPL-MCNC: 6.2 G/DL (ref 6–8.5)
PTH-INTACT SERPL-MCNC: 17 PG/ML (ref 15–65)
RBC # BLD AUTO: 4 X10E6/UL (ref 3.77–5.28)
SL AMB EGFR AFRICAN AMERICAN: 107 ML/MIN/1.73
SL AMB EGFR NON AFRICAN AMERICAN: 93 ML/MIN/1.73
SODIUM SERPL-SCNC: 144 MMOL/L (ref 134–144)
TIBC SERPL-MCNC: 250 UG/DL (ref 250–450)
UIBC SERPL-MCNC: 174 UG/DL (ref 118–369)
VIT A SERPL-MCNC: 52.8 UG/DL (ref 22–69.5)
VIT B1 BLD-SCNC: 152.4 NMOL/L (ref 66.5–200)
VIT B12 SERPL-MCNC: 561 PG/ML (ref 232–1245)
WBC # BLD AUTO: 4.4 X10E3/UL (ref 3.4–10.8)
ZINC SERPL-MCNC: 73 UG/DL (ref 56–134)

## 2019-06-23 DIAGNOSIS — I10 ESSENTIAL HYPERTENSION: ICD-10-CM

## 2019-06-24 RX ORDER — LOSARTAN POTASSIUM 100 MG/1
TABLET ORAL
Qty: 90 TABLET | Refills: 3 | Status: SHIPPED | OUTPATIENT
Start: 2019-06-24 | End: 2020-11-20 | Stop reason: SDUPTHER

## 2019-07-23 DIAGNOSIS — T78.40XD ALLERGIC STATE, SUBSEQUENT ENCOUNTER: ICD-10-CM

## 2019-07-24 RX ORDER — LEVOCETIRIZINE DIHYDROCHLORIDE 5 MG/1
TABLET, FILM COATED ORAL
Qty: 90 TABLET | Refills: 3 | Status: SHIPPED | OUTPATIENT
Start: 2019-07-24 | End: 2020-07-20 | Stop reason: SDUPTHER

## 2019-08-08 DIAGNOSIS — F41.9 ANXIETY: ICD-10-CM

## 2019-08-08 RX ORDER — LORAZEPAM 0.5 MG/1
TABLET ORAL
Qty: 60 TABLET | Refills: 0 | Status: SHIPPED | OUTPATIENT
Start: 2019-08-08 | End: 2020-06-24 | Stop reason: SDUPTHER

## 2019-09-26 DIAGNOSIS — F41.9 ANXIETY: ICD-10-CM

## 2019-09-27 RX ORDER — LORAZEPAM 0.5 MG/1
TABLET ORAL
Qty: 60 TABLET | Refills: 0 | OUTPATIENT
Start: 2019-09-27

## 2019-11-14 ENCOUNTER — OFFICE VISIT (OUTPATIENT)
Dept: FAMILY MEDICINE CLINIC | Facility: CLINIC | Age: 64
End: 2019-11-14
Payer: COMMERCIAL

## 2019-11-14 VITALS
DIASTOLIC BLOOD PRESSURE: 80 MMHG | OXYGEN SATURATION: 98 % | BODY MASS INDEX: 33.02 KG/M2 | HEART RATE: 111 BPM | RESPIRATION RATE: 18 BRPM | SYSTOLIC BLOOD PRESSURE: 118 MMHG | HEIGHT: 64 IN | WEIGHT: 193.4 LBS

## 2019-11-14 DIAGNOSIS — F41.9 ANXIETY: ICD-10-CM

## 2019-11-14 DIAGNOSIS — N30.01 ACUTE CYSTITIS WITH HEMATURIA: Primary | ICD-10-CM

## 2019-11-14 LAB
SL AMB  POCT GLUCOSE, UA: ABNORMAL
SL AMB LEUKOCYTE ESTERASE,UA: 500
SL AMB POCT BILIRUBIN,UA: ABNORMAL
SL AMB POCT BLOOD,UA: 50
SL AMB POCT CLARITY,UA: CLEAR
SL AMB POCT COLOR,UA: YELLOW
SL AMB POCT KETONES,UA: ABNORMAL
SL AMB POCT NITRITE,UA: ABNORMAL
SL AMB POCT PH,UA: 5
SL AMB POCT SPECIFIC GRAVITY,UA: 1.02
SL AMB POCT URINE PROTEIN: ABNORMAL
SL AMB POCT UROBILINOGEN: ABNORMAL

## 2019-11-14 PROCEDURE — 1036F TOBACCO NON-USER: CPT | Performed by: FAMILY MEDICINE

## 2019-11-14 PROCEDURE — 99213 OFFICE O/P EST LOW 20 MIN: CPT | Performed by: FAMILY MEDICINE

## 2019-11-14 PROCEDURE — 81002 URINALYSIS NONAUTO W/O SCOPE: CPT | Performed by: FAMILY MEDICINE

## 2019-11-14 RX ORDER — LORAZEPAM 0.5 MG/1
TABLET ORAL
Qty: 60 TABLET | Refills: 0 | Status: SHIPPED | OUTPATIENT
Start: 2019-11-14 | End: 2020-01-20

## 2019-11-14 RX ORDER — SULFAMETHOXAZOLE AND TRIMETHOPRIM 800; 160 MG/1; MG/1
1 TABLET ORAL EVERY 12 HOURS SCHEDULED
Qty: 6 TABLET | Refills: 0 | Status: SHIPPED | OUTPATIENT
Start: 2019-11-14 | End: 2019-11-17

## 2019-11-18 ENCOUNTER — CLINICAL SUPPORT (OUTPATIENT)
Dept: FAMILY MEDICINE CLINIC | Facility: CLINIC | Age: 64
End: 2019-11-18
Payer: COMMERCIAL

## 2019-11-18 DIAGNOSIS — Z11.1 ENCOUNTER FOR PPD TEST: Primary | ICD-10-CM

## 2019-11-18 PROCEDURE — 86580 TB INTRADERMAL TEST: CPT

## 2019-11-18 NOTE — PROGRESS NOTES
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 she is upset to lose the benefits and income  She does not use her ativan all the time took her almost 3 months to use a months supply but she has taken it alittle more frequently but not more than prescribed  2  Thinks she has uti- frequency and mild burning  No fever no back pain      The following portions of the patient's history were reviewed and updated as appropriate: current medications, past family history, past medical history, past social history, past surgical history and problem list     Review of Systems   Constitutional:        See cc         Objective:      /80 (BP Location: Left arm, Patient Position: Sitting, Cuff Size: Adult)   Pulse (!) 111   Resp 18   Ht 5' 3 5" (1 613 m)   Wt 87 7 kg (193 lb 6 4 oz)   SpO2 98%   BMI 33 72 kg/m²          Physical Exam   Constitutional: She appears well-developed and well-nourished  Cardiovascular: Normal rate, regular rhythm, normal heart sounds and intact distal pulses  Exam reveals no gallop and no friction rub  No murmur heard  Pulmonary/Chest: Effort normal and breath sounds normal  No stridor  No respiratory distress  She has no wheezes  She has no rales  She exhibits no tenderness

## 2019-11-20 ENCOUNTER — CLINICAL SUPPORT (OUTPATIENT)
Dept: FAMILY MEDICINE CLINIC | Facility: CLINIC | Age: 64
End: 2019-11-20

## 2019-11-20 DIAGNOSIS — Z11.1 ENCOUNTER FOR PPD SKIN TEST READING: Primary | ICD-10-CM

## 2019-11-20 LAB
INDURATION: 0 MM
TB SKIN TEST: NEGATIVE

## 2019-12-21 DIAGNOSIS — E66.01 MORBID (SEVERE) OBESITY DUE TO EXCESS CALORIES (HCC): ICD-10-CM

## 2020-01-06 ENCOUNTER — TELEPHONE (OUTPATIENT)
Dept: BARIATRICS | Facility: CLINIC | Age: 65
End: 2020-01-06

## 2020-01-06 RX ORDER — OMEPRAZOLE 20 MG/1
CAPSULE, DELAYED RELEASE ORAL
Qty: 90 CAPSULE | Refills: 1 | Status: SHIPPED | OUTPATIENT
Start: 2020-01-06 | End: 2020-07-10

## 2020-01-06 NOTE — TELEPHONE ENCOUNTER
called patient to schedule an overdue follow up appointment - left a voicemail       ----- Message from Shannon Camargo RN sent at 2020  5:42 AM EST -----  Regardin year follow up appointment  Please contact patient to see if interested in rescheduling 1 year appointment that was canceled by patient  Thank you

## 2020-01-09 NOTE — TELEPHONE ENCOUNTER
Patient returned call and letter stating that she just recently lost her job and has no medical insurance and cannot afford a visit right now  She feels well and is taking her vitamins

## 2020-01-18 DIAGNOSIS — F41.9 ANXIETY: ICD-10-CM

## 2020-01-20 RX ORDER — LORAZEPAM 0.5 MG/1
TABLET ORAL
Qty: 60 TABLET | Refills: 0 | Status: SHIPPED | OUTPATIENT
Start: 2020-01-20 | End: 2020-11-20 | Stop reason: SDUPTHER

## 2020-03-10 DIAGNOSIS — F41.9 ANXIETY: ICD-10-CM

## 2020-03-22 DIAGNOSIS — F41.9 ANXIETY: ICD-10-CM

## 2020-03-23 RX ORDER — LORAZEPAM 0.5 MG/1
TABLET ORAL
Qty: 60 TABLET | Refills: 0 | OUTPATIENT
Start: 2020-03-23

## 2020-03-23 RX ORDER — LORAZEPAM 0.5 MG/1
TABLET ORAL
Qty: 60 TABLET | Refills: 0 | Status: SHIPPED | OUTPATIENT
Start: 2020-03-23 | End: 2020-08-06 | Stop reason: SDUPTHER

## 2020-06-12 DIAGNOSIS — F41.9 ANXIETY: ICD-10-CM

## 2020-06-24 DIAGNOSIS — F41.9 ANXIETY: ICD-10-CM

## 2020-06-25 RX ORDER — LORAZEPAM 0.5 MG/1
0.5 TABLET ORAL 2 TIMES DAILY
Qty: 60 TABLET | Refills: 0 | Status: SHIPPED | OUTPATIENT
Start: 2020-06-25 | End: 2020-08-06 | Stop reason: SDUPTHER

## 2020-06-25 RX ORDER — LORAZEPAM 0.5 MG/1
TABLET ORAL
Qty: 60 TABLET | Refills: 0 | OUTPATIENT
Start: 2020-06-25

## 2020-07-10 DIAGNOSIS — E66.01 MORBID (SEVERE) OBESITY DUE TO EXCESS CALORIES (HCC): ICD-10-CM

## 2020-07-10 RX ORDER — OMEPRAZOLE 20 MG/1
CAPSULE, DELAYED RELEASE ORAL
Qty: 90 CAPSULE | Refills: 1 | Status: SHIPPED | OUTPATIENT
Start: 2020-07-10 | End: 2020-08-06 | Stop reason: SDUPTHER

## 2020-07-15 DIAGNOSIS — T78.40XD ALLERGIC STATE, SUBSEQUENT ENCOUNTER: ICD-10-CM

## 2020-07-20 DIAGNOSIS — T78.40XD ALLERGIC STATE, SUBSEQUENT ENCOUNTER: ICD-10-CM

## 2020-07-20 RX ORDER — LEVOCETIRIZINE DIHYDROCHLORIDE 5 MG/1
5 TABLET, FILM COATED ORAL DAILY
Qty: 90 TABLET | Refills: 1 | Status: SHIPPED | OUTPATIENT
Start: 2020-07-20 | End: 2020-10-23

## 2020-08-06 ENCOUNTER — OFFICE VISIT (OUTPATIENT)
Dept: FAMILY MEDICINE CLINIC | Facility: CLINIC | Age: 65
End: 2020-08-06
Payer: COMMERCIAL

## 2020-08-06 VITALS
DIASTOLIC BLOOD PRESSURE: 96 MMHG | HEART RATE: 91 BPM | TEMPERATURE: 99 F | SYSTOLIC BLOOD PRESSURE: 140 MMHG | HEIGHT: 64 IN | WEIGHT: 197.6 LBS | BODY MASS INDEX: 33.73 KG/M2 | OXYGEN SATURATION: 98 %

## 2020-08-06 DIAGNOSIS — J30.2 SEASONAL ALLERGIES: ICD-10-CM

## 2020-08-06 DIAGNOSIS — Z02.1 PHYSICAL EXAM, PRE-EMPLOYMENT: ICD-10-CM

## 2020-08-06 DIAGNOSIS — J45.909 UNCOMPLICATED ASTHMA, UNSPECIFIED ASTHMA SEVERITY, UNSPECIFIED WHETHER PERSISTENT: ICD-10-CM

## 2020-08-06 DIAGNOSIS — E66.9 OBESITY, CLASS II, BMI 35-39.9: ICD-10-CM

## 2020-08-06 DIAGNOSIS — I10 BENIGN ESSENTIAL HYPERTENSION: Primary | ICD-10-CM

## 2020-08-06 PROCEDURE — 3008F BODY MASS INDEX DOCD: CPT | Performed by: FAMILY MEDICINE

## 2020-08-06 PROCEDURE — 1036F TOBACCO NON-USER: CPT | Performed by: FAMILY MEDICINE

## 2020-08-06 PROCEDURE — 3077F SYST BP >= 140 MM HG: CPT | Performed by: FAMILY MEDICINE

## 2020-08-06 PROCEDURE — 99213 OFFICE O/P EST LOW 20 MIN: CPT | Performed by: FAMILY MEDICINE

## 2020-08-06 PROCEDURE — 3080F DIAST BP >= 90 MM HG: CPT | Performed by: FAMILY MEDICINE

## 2020-08-06 NOTE — PROGRESS NOTES
[id number]  [place], [place], 20     Maxine Kimberlee MRN: [id number] : 1955 Age: 60 y o  Assessment/Plan     1  Physical exam, pre-employment  -patient to return on Monday for ppd    2  Benign essential hypertension  -blood pressure elevated today in office however, patient does states she had cup of coffee before she arrived and is bit nervous when it comes to discussing blood pressures  Patient to keep blood pressure log and follow-up in 2 weeks  Continue with Cozaar at this time  3  Obesity, Class II, BMI 35-39 9  -status post gastric bypass surgery    4  Uncomplicated asthma, unspecified asthma severity, unspecified whether persistent  -stable    5  Seasonal allergies  -stable      [employer] Kimberlee acknowledged understanding of treatment plan, all questions answered  Plan discussed with attending physician Dr Tello Bhatti  Eva Lazo is a 60 y o  female presents for pre-employment exam   Overall, patient states she feels well, no acute complaints  Patient denies any chest pain, shortness of breath, fever, chills, abdominal pain, lower extremity swelling, visual disturbance, hearing loss  Patient offers that her known triggers to asthma include tobacco smoke  Patient does have history of seasonal allergies, under good control  Patient's blood pressure elevated today in office, patient states she has been adherent with Cozaar  She does offer she consume caffeine prior to arrival and gets a bit nervous when discussing blood pressures  Denies any tobacco use  Social alcohol use      The following portions of the patient's history were reviewed and updated as appropriate: allergies, current medications, past family history, past medical history, past social history, past surgical history and problem list      Past Medical History:   Diagnosis Date    Allergic rhinitis     last assessed 14, resolved 12/22/15    Anxiety     Arthritis resolved 12/22/15    Asthma     Bariatric surgery status     Cancer [facility])     derma fibroid sarcoma protuberance    Depression     "situational"    Environmental and seasonal allergies     "smoke,perfume and mold some of my triggers"    GERD (gastroesophageal reflux disease)     Hiatal hernia     History of cellulitis     Hyperlipidemia     "a little high"    Hypertension     Benign essential     Joint stiffness of knee     Low back pain     Lumbar disc disease     Morbid obesity ([address] )     Osteoarthritis     Postgastrectomy malabsorption     Pre-operative laboratory examination     Right knee pain     Shortness of breath     "occas "    Use of cane as ambulatory aid     "for long distances"       Past Surgical History:   Procedure Laterality Date     SECTION      last assessed 14    [facility]    PA EGD TRANSORAL BIOPSY SINGLE/MULTIPLE N/A 2018    Procedure: ESOPHAGOGASTRODUODENOSCOPY (EGD) with bx;  Surgeon: Marianne Tello MD;  Location: [facility];   Service: Bariatrics    PA LAP GASTRIC BYPASS/AMISH-EN-Y N/A 2018    Procedure: LAPAROSCOPIC AMISH-EN-Y GASTRIC BYPASS AND INTRAOPERATIVE EGD;  Surgeon: Marianne Tello MD;  Location: [facility];  Service: Bariatrics       Current Outpatient Medications   Medication Sig Dispense Refill    albuterol (PROAIR HFA) 90 mcg/act inhaler Inhale 2 puffs every 6 (six) hours as needed        Cholecalciferol (VITAMIN D) 2000 units CAPS Take 1 capsule by mouth every evening        fluticasone (VERAMYST) 27 5 MCG/SPRAY nasal spray 2 sprays into each nostril daily 10 g 0    levocetirizine (XYZAL) 5 MG tablet Take 1 tablet (5 mg total) by mouth daily 90 tablet 1    losartan (COZAAR) 100 MG tablet TAKE ONE TABLET BY MOUTH EVERY DAY 90 tablet 3    Multiple Vitamins-Minerals (WOMENS MULTIVITAMIN PO) Take 1 tablet by mouth daily at bedtime        acetaminophen (TYLENOL) 500 mg tablet Take 500 mg by mouth every 6 (six) hours as needed for mild pain      ipratropium-albuterol (DUO-NEB) 0 5-2 5 mg/3 mL Take 3 mL by nebulization every 6 (six) hours as needed        LORazepam (ATIVAN) 0 5 mg tablet TAKE ONE TABLET BY MOUTH TWICE A DAY AS NEEDED (Patient not taking: Reported on 8/6/2020) 60 tablet 0    omeprazole (PriLOSEC) 20 mg delayed release capsule TAKE ONE CAPSULE BY MOUTH EVERY DAY (GENERIC PRILOSEC) (Patient not taking: Reported on 11/14/2019) 90 capsule 1     No current facility-administered medications for this visit  Review of Systems     As noted in HPI    Objective      /96 (BP Location: Left arm, Patient Position: Sitting, Cuff Size: Large)   Pulse 91   Temp 99 °F (37 2 °C) (Tympanic)   Ht 5' 4" (1 626 m)   Wt 89 6 kg (197 lb 9 6 oz)   SpO2 98%   BMI 33 92 kg/m²     Physical Exam   Constitutional: She is oriented to person, place, and time  She does not appear ill  No distress  HENT:   Head: Normocephalic and atraumatic  Right Ear: External ear normal    Left Ear: External ear normal    Nose: Nose normal    Mouth/Throat: Mucous membranes are moist  Oropharynx is clear  Eyes: Pupils are equal, round, and reactive to light  Conjunctivae are normal    Neck: Neck supple  Cardiovascular: Normal rate and normal pulses  Pulmonary/Chest: Effort normal and breath sounds normal    Abdominal: Soft  Normal appearance and bowel sounds are normal  There is no abdominal tenderness  Neurological: She is alert and oriented to person, place, and time  Skin: Skin is warm and dry  Capillary refill takes less than 2 seconds  Psychiatric: Her behavior is normal  Mood and thought content normal          Some portions of this record may have been generated with voice recognition software  There may be translation, syntax, or grammatical errors  Occasional wrong word or "sound-a-like" substitutions may have occurred due to the inherent limitations of the voice recognition software   Read the chart carefully and recognize, using context, where substations may have occurred  If you have any questions, please contact the dictating provider for clarification or correction, as needed

## 2020-08-10 ENCOUNTER — CLINICAL SUPPORT (OUTPATIENT)
Dept: FAMILY MEDICINE CLINIC | Facility: CLINIC | Age: 65
End: 2020-08-10
Payer: COMMERCIAL

## 2020-08-10 VITALS — HEIGHT: 64 IN | WEIGHT: 199.4 LBS | TEMPERATURE: 98.4 F | BODY MASS INDEX: 34.04 KG/M2

## 2020-08-10 DIAGNOSIS — Z11.1 SCREENING FOR TUBERCULOSIS: Primary | ICD-10-CM

## 2020-08-10 PROCEDURE — 3008F BODY MASS INDEX DOCD: CPT

## 2020-08-10 PROCEDURE — 99211 OFF/OP EST MAY X REQ PHY/QHP: CPT

## 2020-08-10 PROCEDURE — 3080F DIAST BP >= 90 MM HG: CPT

## 2020-08-10 PROCEDURE — 3077F SYST BP >= 140 MM HG: CPT

## 2020-08-10 PROCEDURE — 86580 TB INTRADERMAL TEST: CPT

## 2020-08-12 ENCOUNTER — CLINICAL SUPPORT (OUTPATIENT)
Dept: FAMILY MEDICINE CLINIC | Facility: CLINIC | Age: 65
End: 2020-08-12
Payer: COMMERCIAL

## 2020-08-12 DIAGNOSIS — Z11.1 ENCOUNTER FOR PPD SKIN TEST READING: Primary | ICD-10-CM

## 2020-08-12 LAB
INDURATION: NORMAL MM
TB SKIN TEST: NEGATIVE

## 2020-08-12 PROCEDURE — 3077F SYST BP >= 140 MM HG: CPT

## 2020-08-12 PROCEDURE — 1036F TOBACCO NON-USER: CPT

## 2020-08-12 PROCEDURE — 3080F DIAST BP >= 90 MM HG: CPT

## 2020-08-12 PROCEDURE — 99211 OFF/OP EST MAY X REQ PHY/QHP: CPT

## 2020-09-22 DIAGNOSIS — I10 ESSENTIAL HYPERTENSION: ICD-10-CM

## 2020-09-25 DIAGNOSIS — F41.9 ANXIETY: ICD-10-CM

## 2020-10-01 DIAGNOSIS — F41.9 ANXIETY: ICD-10-CM

## 2020-10-22 DIAGNOSIS — T78.40XD ALLERGY, SUBSEQUENT ENCOUNTER: ICD-10-CM

## 2020-10-23 DIAGNOSIS — F41.9 ANXIETY: ICD-10-CM

## 2020-10-23 RX ORDER — LEVOCETIRIZINE DIHYDROCHLORIDE 5 MG/1
TABLET, FILM COATED ORAL
Qty: 90 TABLET | Refills: 1 | Status: SHIPPED | OUTPATIENT
Start: 2020-10-23 | End: 2020-11-19

## 2020-11-06 DIAGNOSIS — I10 ESSENTIAL HYPERTENSION: ICD-10-CM

## 2020-11-06 DIAGNOSIS — F41.9 ANXIETY: ICD-10-CM

## 2020-11-09 RX ORDER — LOSARTAN POTASSIUM 100 MG/1
100 TABLET ORAL DAILY
Qty: 90 TABLET | Refills: 3 | OUTPATIENT
Start: 2020-11-09

## 2020-11-09 RX ORDER — LORAZEPAM 0.5 MG/1
TABLET ORAL
Qty: 60 TABLET | Refills: 0 | OUTPATIENT
Start: 2020-11-09

## 2020-11-18 DIAGNOSIS — T78.40XD ALLERGY, SUBSEQUENT ENCOUNTER: ICD-10-CM

## 2020-11-19 RX ORDER — LEVOCETIRIZINE DIHYDROCHLORIDE 5 MG/1
TABLET, FILM COATED ORAL
Qty: 90 TABLET | Refills: 1 | Status: SHIPPED | OUTPATIENT
Start: 2020-11-19 | End: 2020-11-22

## 2020-11-20 ENCOUNTER — TELEMEDICINE (OUTPATIENT)
Dept: FAMILY MEDICINE CLINIC | Facility: CLINIC | Age: 65
End: 2020-11-20
Payer: COMMERCIAL

## 2020-11-20 ENCOUNTER — TELEPHONE (OUTPATIENT)
Dept: FAMILY MEDICINE CLINIC | Facility: CLINIC | Age: 65
End: 2020-11-20

## 2020-11-20 DIAGNOSIS — I10 ESSENTIAL HYPERTENSION: ICD-10-CM

## 2020-11-20 DIAGNOSIS — F41.9 ANXIETY: ICD-10-CM

## 2020-11-20 DIAGNOSIS — F41.8 DEPRESSION WITH ANXIETY: Primary | ICD-10-CM

## 2020-11-20 PROCEDURE — 99213 OFFICE O/P EST LOW 20 MIN: CPT | Performed by: FAMILY MEDICINE

## 2020-11-20 RX ORDER — LORAZEPAM 0.5 MG/1
TABLET ORAL
Qty: 30 TABLET | Refills: 0 | Status: SHIPPED | OUTPATIENT
Start: 2020-11-20 | End: 2021-02-15

## 2020-11-20 RX ORDER — LOSARTAN POTASSIUM 100 MG/1
100 TABLET ORAL DAILY
Qty: 90 TABLET | Refills: 0 | Status: SHIPPED | OUTPATIENT
Start: 2020-11-20 | End: 2021-02-15

## 2020-11-21 DIAGNOSIS — T78.40XD ALLERGY, SUBSEQUENT ENCOUNTER: ICD-10-CM

## 2020-11-22 RX ORDER — LEVOCETIRIZINE DIHYDROCHLORIDE 5 MG/1
TABLET, FILM COATED ORAL
Qty: 90 TABLET | Refills: 1 | Status: SHIPPED | OUTPATIENT
Start: 2020-11-22 | End: 2021-01-22

## 2020-12-24 RX ORDER — LORAZEPAM 0.5 MG/1
TABLET ORAL
Qty: 60 TABLET | Refills: 0 | OUTPATIENT
Start: 2020-12-24

## 2020-12-24 RX ORDER — LEVOCETIRIZINE DIHYDROCHLORIDE 5 MG/1
TABLET, FILM COATED ORAL
Qty: 90 TABLET | Refills: 3 | OUTPATIENT
Start: 2020-12-24

## 2020-12-24 RX ORDER — LOSARTAN POTASSIUM 100 MG/1
TABLET ORAL
Qty: 90 TABLET | Refills: 3 | OUTPATIENT
Start: 2020-12-24

## 2020-12-26 DIAGNOSIS — E66.01 MORBID (SEVERE) OBESITY DUE TO EXCESS CALORIES (HCC): ICD-10-CM

## 2020-12-28 RX ORDER — OMEPRAZOLE 20 MG/1
CAPSULE, DELAYED RELEASE ORAL
Qty: 90 CAPSULE | Refills: 1 | Status: SHIPPED | OUTPATIENT
Start: 2020-12-28 | End: 2021-06-28

## 2021-01-08 ENCOUNTER — TELEPHONE (OUTPATIENT)
Dept: BARIATRICS | Facility: CLINIC | Age: 66
End: 2021-01-08

## 2021-01-08 NOTE — TELEPHONE ENCOUNTER
----- Message from Marie Palencia RN sent at 2021  6:25 AM EST -----  Regardin year f/u appointment  Please contact patient to schedule a 2 year follow up appointment    Thank You

## 2021-01-21 DIAGNOSIS — T78.40XD ALLERGY, SUBSEQUENT ENCOUNTER: ICD-10-CM

## 2021-01-22 RX ORDER — LEVOCETIRIZINE DIHYDROCHLORIDE 5 MG/1
TABLET, FILM COATED ORAL
Qty: 90 TABLET | Refills: 1 | Status: SHIPPED | OUTPATIENT
Start: 2021-01-22 | End: 2021-09-10 | Stop reason: SDUPTHER

## 2021-02-13 DIAGNOSIS — I10 ESSENTIAL HYPERTENSION: ICD-10-CM

## 2021-02-13 DIAGNOSIS — F41.9 ANXIETY: ICD-10-CM

## 2021-02-15 RX ORDER — LORAZEPAM 0.5 MG/1
TABLET ORAL
Qty: 30 TABLET | Refills: 0 | Status: SHIPPED | OUTPATIENT
Start: 2021-02-15 | End: 2021-04-23

## 2021-02-15 RX ORDER — LOSARTAN POTASSIUM 100 MG/1
TABLET ORAL
Qty: 90 TABLET | Refills: 0 | Status: SHIPPED | OUTPATIENT
Start: 2021-02-15 | End: 2021-05-25

## 2021-02-19 DIAGNOSIS — Z23 ENCOUNTER FOR IMMUNIZATION: ICD-10-CM

## 2021-04-21 DIAGNOSIS — F41.9 ANXIETY: ICD-10-CM

## 2021-04-23 RX ORDER — LORAZEPAM 0.5 MG/1
TABLET ORAL
Qty: 30 TABLET | Refills: 0 | Status: SHIPPED | OUTPATIENT
Start: 2021-04-23 | End: 2021-07-02

## 2021-05-24 DIAGNOSIS — I10 ESSENTIAL HYPERTENSION: ICD-10-CM

## 2021-05-25 RX ORDER — LOSARTAN POTASSIUM 100 MG/1
TABLET ORAL
Qty: 90 TABLET | Refills: 0 | Status: SHIPPED | OUTPATIENT
Start: 2021-05-25 | End: 2021-08-30 | Stop reason: SDUPTHER

## 2021-06-23 DIAGNOSIS — E66.01 MORBID (SEVERE) OBESITY DUE TO EXCESS CALORIES (HCC): ICD-10-CM

## 2021-06-28 DIAGNOSIS — F41.9 ANXIETY: ICD-10-CM

## 2021-06-28 RX ORDER — OMEPRAZOLE 20 MG/1
CAPSULE, DELAYED RELEASE ORAL
Qty: 90 CAPSULE | Refills: 1 | Status: SHIPPED | OUTPATIENT
Start: 2021-06-28 | End: 2021-09-10 | Stop reason: ALTCHOICE

## 2021-07-02 RX ORDER — LORAZEPAM 0.5 MG/1
TABLET ORAL
Qty: 30 TABLET | Refills: 0 | Status: SHIPPED | OUTPATIENT
Start: 2021-07-02 | End: 2022-01-28 | Stop reason: ALTCHOICE

## 2021-07-16 DIAGNOSIS — T78.40XD ALLERGY, SUBSEQUENT ENCOUNTER: ICD-10-CM

## 2021-07-16 RX ORDER — LEVOCETIRIZINE DIHYDROCHLORIDE 5 MG/1
TABLET, FILM COATED ORAL
Qty: 90 TABLET | Refills: 1 | Status: CANCELLED | OUTPATIENT
Start: 2021-07-16

## 2021-07-16 NOTE — TELEPHONE ENCOUNTER
Patient left message requesting a refill on medication  Last in office visit 8/6/2020      Please contact patient to schedule appointment

## 2021-07-19 NOTE — TELEPHONE ENCOUNTER
Called patient to schedule appt  She said she is too busy to schedule appt right now  Will have to call back another time

## 2021-08-30 DIAGNOSIS — I10 ESSENTIAL HYPERTENSION: ICD-10-CM

## 2021-08-30 RX ORDER — LOSARTAN POTASSIUM 100 MG/1
100 TABLET ORAL DAILY
Qty: 90 TABLET | Refills: 0 | Status: SHIPPED | OUTPATIENT
Start: 2021-08-30 | End: 2021-09-10 | Stop reason: SDUPTHER

## 2021-08-30 NOTE — TELEPHONE ENCOUNTER
patient is requesting medication refilled to get her thru until her appointment on 9/10 with Kalyani Moreira we were unable to get her in any sooner patient is almost out of the medication

## 2021-09-10 ENCOUNTER — OFFICE VISIT (OUTPATIENT)
Dept: FAMILY MEDICINE CLINIC | Facility: CLINIC | Age: 66
End: 2021-09-10
Payer: COMMERCIAL

## 2021-09-10 VITALS
HEART RATE: 84 BPM | HEIGHT: 64 IN | WEIGHT: 205.1 LBS | DIASTOLIC BLOOD PRESSURE: 78 MMHG | RESPIRATION RATE: 18 BRPM | BODY MASS INDEX: 35.01 KG/M2 | TEMPERATURE: 97.9 F | OXYGEN SATURATION: 97 % | SYSTOLIC BLOOD PRESSURE: 124 MMHG

## 2021-09-10 DIAGNOSIS — Z12.12 SCREENING FOR COLORECTAL CANCER: ICD-10-CM

## 2021-09-10 DIAGNOSIS — G47.00 INSOMNIA, UNSPECIFIED TYPE: ICD-10-CM

## 2021-09-10 DIAGNOSIS — Z12.11 SCREENING FOR COLORECTAL CANCER: ICD-10-CM

## 2021-09-10 DIAGNOSIS — Z23 ENCOUNTER FOR IMMUNIZATION: ICD-10-CM

## 2021-09-10 DIAGNOSIS — Z11.4 SCREENING FOR HIV (HUMAN IMMUNODEFICIENCY VIRUS): ICD-10-CM

## 2021-09-10 DIAGNOSIS — K21.9 GASTROESOPHAGEAL REFLUX DISEASE WITHOUT ESOPHAGITIS: ICD-10-CM

## 2021-09-10 DIAGNOSIS — T78.40XD ALLERGY, SUBSEQUENT ENCOUNTER: ICD-10-CM

## 2021-09-10 DIAGNOSIS — F41.9 ANXIETY: ICD-10-CM

## 2021-09-10 DIAGNOSIS — I10 ESSENTIAL HYPERTENSION: ICD-10-CM

## 2021-09-10 DIAGNOSIS — Z13.220 SCREENING FOR HYPERLIPIDEMIA: ICD-10-CM

## 2021-09-10 DIAGNOSIS — Z11.59 NEED FOR HEPATITIS C SCREENING TEST: Primary | ICD-10-CM

## 2021-09-10 DIAGNOSIS — Z13.228 SCREENING FOR METABOLIC DISORDER: ICD-10-CM

## 2021-09-10 DIAGNOSIS — E66.01 MORBID (SEVERE) OBESITY DUE TO EXCESS CALORIES (HCC): ICD-10-CM

## 2021-09-10 PROCEDURE — 99213 OFFICE O/P EST LOW 20 MIN: CPT | Performed by: FAMILY MEDICINE

## 2021-09-10 PROCEDURE — 3008F BODY MASS INDEX DOCD: CPT | Performed by: FAMILY MEDICINE

## 2021-09-10 PROCEDURE — 3074F SYST BP LT 130 MM HG: CPT | Performed by: FAMILY MEDICINE

## 2021-09-10 PROCEDURE — 1036F TOBACCO NON-USER: CPT | Performed by: FAMILY MEDICINE

## 2021-09-10 PROCEDURE — 3078F DIAST BP <80 MM HG: CPT | Performed by: FAMILY MEDICINE

## 2021-09-10 PROCEDURE — 4040F PNEUMOC VAC/ADMIN/RCVD: CPT | Performed by: FAMILY MEDICINE

## 2021-09-10 RX ORDER — HYDROXYZINE HYDROCHLORIDE 25 MG/1
25 TABLET, FILM COATED ORAL
Qty: 30 TABLET | Refills: 1 | Status: SHIPPED | OUTPATIENT
Start: 2021-09-10 | End: 2021-11-11

## 2021-09-10 RX ORDER — LEVOCETIRIZINE DIHYDROCHLORIDE 5 MG/1
TABLET, FILM COATED ORAL
Qty: 90 TABLET | Refills: 1 | Status: SHIPPED | OUTPATIENT
Start: 2021-09-10 | End: 2022-03-14

## 2021-09-10 RX ORDER — LOSARTAN POTASSIUM 100 MG/1
100 TABLET ORAL DAILY
Qty: 90 TABLET | Refills: 0 | Status: SHIPPED | OUTPATIENT
Start: 2021-09-10 | End: 2021-12-07

## 2021-09-10 RX ORDER — FAMOTIDINE 40 MG/5ML
20 POWDER, FOR SUSPENSION ORAL 2 TIMES DAILY PRN
Qty: 50 ML | Refills: 1 | Status: SHIPPED | OUTPATIENT
Start: 2021-09-10

## 2021-09-10 NOTE — PROGRESS NOTES
Assessment/Plan:     Diagnoses and all orders for this visit:    Need for hepatitis C screening test  -     Hepatitis C Antibody ([facility]); Future    Screening for HIV (human immunodeficiency virus)  -     HIV 1/2 Antigen/Antibody (4th Generation) w Reflex [facility]; Future    Screening for colorectal cancer  -     Ambulatory referral to Gastroenterology; Future    Encounter for immunization  -     PNEUMOCOCCAL POLYSACCHARIDE VACCINE 23-VALENT =>3YO SQ IM  -     influenza vaccine, high-dose, PF 0 7 mL (FLUZONE HIGH-DOSE)    Essential hypertension  -     losartan (COZAAR) 100 MG tablet; Take 1 tablet (100 mg total) by mouth daily    Allergy, subsequent encounter  -     levocetirizine (XYZAL) 5 MG tablet; Take one tablet by mouth every day during seasonal allergy season  Morbid (severe) obesity due to excess calories (HCC)    Gastroesophageal reflux disease without esophagitis  -     famotidine (PEPCID) 20 mg/2 5 mL oral suspension; Take 2 5 mL (20 mg total) by mouth 2 (two) times a day as needed for heartburn    Anxiety  -     hydrOXYzine HCL (ATARAX) 25 mg tablet; Take 1 tablet (25 mg total) by mouth daily at bedtime    Insomnia, unspecified type  -     hydrOXYzine HCL (ATARAX) 25 mg tablet; Take 1 tablet (25 mg total) by mouth daily at bedtime    Screening for metabolic disorder  -     Basic metabolic panel; Future    Screening for hyperlipidemia  -     Lipid panel; Future      -patient advised to follow up for annual visit    -Omeprazole discontinued and started on Pepcid    -Ativan discontinued and started on Atarax 25mg QHS  -Losartan 100mg QD was already refilled on 8/30  -She has already had 2 of 2 of her COVID vaccines    Subjective:      Patient ID: Greta Cloud is a 73 y o  female  HPI  Patient is 72 yo female with PMH of HYN, anxiety, depression, s/p gastric bypass, who presents for follow up of HTN and medication refill of her Losartan 100mg QD  BP today is 124/78   Patient denies any chest pain, vision changes, shortness of breath, headaches, abdominal pain, constipation or diarrhea  The following portions of the patient's history were reviewed and updated as appropriate: allergies, current medications, past medical history, past social history and problem list     Review of Systems    Per HPI    Objective:      /78 (BP Location: Left arm, Patient Position: Sitting, Cuff Size: Large)   Pulse 84   Temp 97 9 °F (36 6 °C) (Tympanic)   Resp 18   Ht 5' 4" (1 626 m)   Wt 93 kg (205 lb 1 6 oz)   SpO2 97%   BMI 35 21 kg/m²          Physical Exam  Constitutional:       Appearance: Normal appearance  She is obese  HENT:      Right Ear: Tympanic membrane normal       Mouth/Throat:      Mouth: Mucous membranes are moist       Pharynx: Oropharynx is clear  No oropharyngeal exudate or posterior oropharyngeal erythema  Cardiovascular:      Rate and Rhythm: Normal rate and regular rhythm  Pulses: Normal pulses  Heart sounds: Normal heart sounds  Pulmonary:      Effort: Pulmonary effort is normal       Breath sounds: Normal breath sounds  Abdominal:      General: Bowel sounds are normal       Palpations: Abdomen is soft  Tenderness: There is no abdominal tenderness  There is no guarding  Musculoskeletal:      Right lower leg: Edema present  Left lower leg: Edema present  Skin:     General: Skin is warm  Capillary Refill: Capillary refill takes less than 2 seconds  Neurological:      General: No focal deficit present  Mental Status: She is alert and oriented to person, place, and time  Psychiatric:         Mood and Affect: Mood normal          Behavior: Behavior normal          Thought Content:  Thought content normal          Judgment: Judgment normal

## 2021-09-17 DIAGNOSIS — E66.01 MORBID (SEVERE) OBESITY DUE TO EXCESS CALORIES (HCC): ICD-10-CM

## 2021-09-20 RX ORDER — OMEPRAZOLE 20 MG/1
CAPSULE, DELAYED RELEASE ORAL
Qty: 90 CAPSULE | Refills: 1 | Status: SHIPPED | OUTPATIENT
Start: 2021-09-20 | End: 2022-01-06

## 2021-10-20 ENCOUNTER — TELEPHONE (OUTPATIENT)
Dept: FAMILY MEDICINE CLINIC | Facility: CLINIC | Age: 66
End: 2021-10-20

## 2021-11-11 DIAGNOSIS — F41.9 ANXIETY: ICD-10-CM

## 2021-11-11 DIAGNOSIS — G47.00 INSOMNIA, UNSPECIFIED TYPE: ICD-10-CM

## 2021-11-11 RX ORDER — HYDROXYZINE HYDROCHLORIDE 25 MG/1
TABLET, FILM COATED ORAL
Qty: 30 TABLET | Refills: 1 | Status: SHIPPED | OUTPATIENT
Start: 2021-11-11 | End: 2022-01-13

## 2022-01-05 DIAGNOSIS — E66.01 MORBID (SEVERE) OBESITY DUE TO EXCESS CALORIES (HCC): ICD-10-CM

## 2022-01-06 RX ORDER — OMEPRAZOLE 20 MG/1
CAPSULE, DELAYED RELEASE ORAL
Qty: 90 CAPSULE | Refills: 1 | Status: SHIPPED | OUTPATIENT
Start: 2022-01-06 | End: 2022-07-11

## 2022-01-13 DIAGNOSIS — G47.00 INSOMNIA, UNSPECIFIED TYPE: ICD-10-CM

## 2022-01-13 DIAGNOSIS — F41.9 ANXIETY: ICD-10-CM

## 2022-01-13 RX ORDER — HYDROXYZINE HYDROCHLORIDE 25 MG/1
TABLET, FILM COATED ORAL
Qty: 30 TABLET | Refills: 1 | Status: SHIPPED | OUTPATIENT
Start: 2022-01-13

## 2022-01-28 NOTE — PROGRESS NOTES
Ativan was discontinued during the past few office visits   I've removed ativan from the list to stop the refill requests

## 2022-02-19 DIAGNOSIS — I10 ESSENTIAL HYPERTENSION: ICD-10-CM

## 2022-02-23 RX ORDER — LOSARTAN POTASSIUM 100 MG/1
TABLET ORAL
Qty: 90 TABLET | Refills: 0 | Status: SHIPPED | OUTPATIENT
Start: 2022-02-23 | End: 2022-02-25

## 2022-03-14 DIAGNOSIS — T78.40XD ALLERGY, SUBSEQUENT ENCOUNTER: ICD-10-CM

## 2022-03-14 RX ORDER — LEVOCETIRIZINE DIHYDROCHLORIDE 5 MG/1
TABLET, FILM COATED ORAL
Qty: 90 TABLET | Refills: 1 | Status: SHIPPED | OUTPATIENT
Start: 2022-03-14

## 2022-04-25 NOTE — DISCHARGE INSTRUCTIONS
Notified by RN; pt requesting to change Full Code back to DNR/DNI status.    Pt seen and evaluated at bedside with RN present.    Pt is A&Ox4; requesting Full Code back to DNR/DNI Status  MOLST form completed with RN as witness, placed in physical chart.  DNR/DNI ordered by writer.  Family present and at bedside aware  Will endorse to AM team Upper Endoscopy   WHAT YOU NEED TO KNOW:   An upper endoscopy is also called an upper gastrointestinal (GI) endoscopy, or an esophagogastroduodenoscopy (EGD)  You may feel bloated, gassy, or have some abdominal discomfort after your procedure  Your throat may be sore for 24 to 36 hours  You may burp or pass gas from air that is still inside your body  DISCHARGE INSTRUCTIONS:   Call 911 if:   · You have sudden chest pain or trouble breathing  Seek care immediately if:   · You feel dizzy or faint  · You have trouble swallowing  · You have severe throat pain  · Your bowel movements are very dark or black  · Your abdomen is hard and firm and you have severe pain  · You vomit blood  Contact your healthcare provider if:   · You feel full or bloated and cannot burp or pass gas  · You have not had a bowel movement for 3 days after your procedure  · You have neck pain  · You have a fever or chills  · You have nausea or are vomiting  · You have a rash or hives  · You have questions or concerns about your endoscopy  Relieve a sore throat:  Suck on throat lozenges or crushed ice  Gargle with a small amount of warm salt water  Mix 1 teaspoon of salt and 1 cup of warm water to make salt water  Relieve gas and discomfort from bloating:  Lie on your right side with a heating pad on your abdomen  Take short walks to help pass gas  Eat small meals until bloating is relieved  Rest after your procedure:  Do not drive or make important decisions until the day after your procedure  Return to your normal activity as directed  You can usually return to work the day after your procedure  Follow up with your healthcare provider as directed:  Write down your questions so you remember to ask them during your visits  © 2017 [employer]  Information is for End User's use only and may not be sold, redistributed or otherwise used for commercial purposes   All illustrations and images included in [place] are the copyrighted property of [employer]  or [employer] En[employer]  The above information is an  only  It is not intended as medical advice for individual conditions or treatments  Talk to your doctor, nurse or pharmacist before following any medical regimen to see if it is safe and effective for you  Esophagitis   WHAT YOU NEED TO KNOW:   What is esophagitis? Esophagitis is inflammation or irritation of the lining of the esophagus  What causes esophagitis? The most common cause is acid reflux  This means stomach acid backs up into your esophagus  The following can also cause esophagitis:  · An infection from bacteria, a virus, or a fungus    · Vomiting or a hiatal hernia    · Medicines such as aspirin or NSAIDs    · Large pills taken without enough water or right before you go to bed    · Cancer treatment, such as radiation    · A toxic object you swallowed, such as a button battery, that gets stuck in your esophagus    · Too much caffeine or acidic or spicy foods    · Cigarette smoking  What are the signs and symptoms of esophagitis? Signs and symptoms depend on the cause of your esophagitis  You may have any of the following:  · Pain in the middle of your chest that may spread to your back    · Burning or pain in your esophagus, abdominal pain, or indigestion    · Trouble swallowing, or pain when you swallow    · A feeling that something is stuck in your esophagus    · Sore throat, a cough, or hoarseness    · Gagging, drooling, or wheezing    · Mouth sores (white patches), or a bad taste in your mouth or bad breath    · Nausea or vomiting    · Feeding problems or failure to thrive (young children)  How is esophagitis diagnosed? Your healthcare provider will ask about your symptoms and when they started  Tell him if anything makes your symptoms worse or better   You may need any of the following:  · An endoscopy  is a procedure used to look at your esophagus and stomach  Your healthcare provider will use an endoscope (tube with a camera and light on the end)  He may also take a tissue sample during the procedure  The sample may show if your esophagus was damaged by what is causing your esophagitis  · A barium swallow  is done to show if your esophagus was damaged and how badly it was damaged  X-rays are taken after you swallow barium liquid  Barium liquid is used to help damage show up on the x-ray  How is esophagitis treated? The goal of treatment is to help the lining of your esophagus heal and to prevent serious complications  Treatment will depend on what is causing your esophagitis  Symptoms caused by a toxic object such as a button battery need immediate treatment  Less severe causes may not need treatment  You may need any of the following if symptoms continue or get worse:  · Medicines  may be given to fight infection or to control stomach acid  Your healthcare provider may make changes to your medicines, such as changing it to a liquid form  · An elimination diet  may help you find foods that are causing your symptoms  You will stop eating certain foods that can cause esophagitis  Your healthcare provider will tell you to start eating them again one at a time  Each time you do not have symptoms, you will start eating another food from the list  Any food that does cause symptoms may be causing your esophagitis  · Surgery  may be needed if other treatments do not work  Part of your stomach can be wrapped to cover the valve between your stomach and esophagus  This helps prevent acid from backing up into your esophagus  What can I do to manage or prevent esophagitis? · Do not smoke  Nicotine and other chemicals in cigarettes and cigars can cause blood vessel and lung damage  Ask your healthcare provider for information if you currently smoke and need help to quit  E-cigarettes or smokeless tobacco still contain nicotine   Talk to your healthcare provider before you use these products  · Do not drink alcohol  Alcohol can irritate your esophagus  Talk to your healthcare provider if you need help to stop drinking  · Limit or do not eat foods that can lead to esophagitis  Foods such as oranges and salsa can irritate your esophagus  Caffeine and chocolate can cause acid reflux  High-fat and fried foods make your stomach digest food more slowly  This increases the amount of stomach acid your esophagus is exposed to  Eat small meals, and drink water with your meals  Soft foods such as yogurt and applesauce may help soothe your throat  Do not eat for at least 3 hours before you go to bed  · Keep batteries and similar objects out of the reach of children  Babies often put items in their mouths to explore them  Button batteries are easy to swallow and can cause serious damage  Keep the battery covers of electronic devices such as remote controls taped closed  Store all batteries and toxic materials where children cannot get to them  Use childproof locks to keep children away from dangerous materials  · Drink more liquid when you take pills  Drink a full glass of water when you take your pills  Ask your healthcare provider if you can take your pills at least an hour before you go to bed  · Prevent acid reflux  Do not bend over unless it is necessary  Acid may back up into your esophagus when you bend over  If possible, keep the head of your bed elevated while you sleep  This will help keep acid from backing up  Manage stress  Stress can make your symptoms worse or cause stomach acid to back up  Call 911 for any of the following:   · You have chest pain that does not go away within a few minutes or gets worse  When should I seek immediate care? · You feel like you have food stuck in your throat and you cannot cough it out  When should I contact my healthcare provider?    · You have new or worsening symptoms, even after treatment  · You have questions or concerns about your condition or care  CARE AGREEMENT:   You have the right to help plan your care  Learn about your health condition and how it may be treated  Discuss treatment options with your caregivers to decide what care you want to receive  You always have the right to refuse treatment  The above information is an  only  It is not intended as medical advice for individual conditions or treatments  Talk to your doctor, nurse or pharmacist before following any medical regimen to see if it is safe and effective for you  © 2017 [employer]  Information is for End User's use only and may not be sold, redistributed or otherwise used for commercial purposes  All illustrations and images included in CareNotes® are the copyrighted property of [employer]  or [employer] Enriquez  Gastritis   WHAT YOU NEED TO KNOW:   What is gastritis? Gastritis is inflammation or irritation of the lining of your stomach  What increases my risk for gastritis? · Infection with bacteria, a virus, or a parasite    · NSAIDs, aspirin, or steroid medicine    · Use of tobacco products or alcohol    · Trauma such as an injury to your stomach or intestine    · Autoimmune disorders such as diabetes, thyroid disease, or Crohn disease    · Stress    · Age older than 60 years    · Illegal drugs, such as cocaine  What are the signs and symptoms of gastritis? · Stomach pain, burning, or tenderness when you press on it    · Stomach fullness or tightness    · Nausea or vomiting    · Loss of appetite, or feeling full quickly when you eat    · Bad breath    · Fatigue or feeling more tired than usual    · Heartburn  How is gastritis diagnosed? Your healthcare provider will ask about your signs and symptoms and examine you   You may need any of the following:  · Blood tests  may be used to show an infection, dehydration, or anemia (low red blood cell levels)  · A bowel movement sample  may be tested for blood or the germ that may be causing your gastritis  · A breath test  may show if H pylori is causing your gastritis  You will be given a liquid to drink  Then you will breathe into a bag  Your healthcare provider will measure the amount of carbon dioxide in your breath  Extra amounts of carbon dioxide may mean you have an H pylori infection  · An endoscopy  may be used to look for irritation or bleeding in your stomach  Your healthcare provider will use an endoscope (tube with a light and camera on the end) during the procedure  He or she may take a sample from your stomach to be tested  How is gastritis treated? Your symptoms may go away without treatment  Treatment will depend on what is causing your gastritis  Your healthcare provider may recommend changes to the medicines you take  Medicines may be given to help treat a bacterial infection or decrease stomach acid  How can I manage or prevent gastritis? · Do not smoke  Nicotine and other chemicals in cigarettes and cigars can make your symptoms worse and cause lung damage  Ask your healthcare provider for information if you currently smoke and need help to quit  E-cigarettes or smokeless tobacco still contain nicotine  Talk to your healthcare provider before you use these products  · Do not drink alcohol  Alcohol can prevent healing and make your gastritis worse  Talk to your healthcare provider if you need help to stop drinking  · Do not take NSAIDs or aspirin unless directed  These and similar medicines can cause irritation of your stomach lining  If your healthcare provider says it is okay to take NSAIDs, take them with food  · Do not eat foods that cause irritation  Foods such as oranges and salsa can cause burning or pain  Eat a variety of healthy foods   Examples include fruits (not citrus), vegetables, low-fat dairy products, beans, whole-grain breads, and lean meats and fish  Try to eat small meals, and drink water with your meals  Do not eat for at least 3 hours before you go to bed  · Find ways to relax and decrease stress  Stress can increase stomach acid and make gastritis worse  Activities such as yoga, meditation, or listening to music can help you relax  Spend time with friends, or do things you enjoy  Call 911 for any of the following:   · You develop chest pain or shortness of breath  When should I seek immediate care? · You vomit blood  · You have black or bloody bowel movements  · You have severe stomach or back pain  When should I contact my healthcare provider? · You have a fever  · You have new or worsening symptoms, even after treatment  · You have questions or concerns about your condition or care  CARE AGREEMENT:   You have the right to help plan your care  Learn about your health condition and how it may be treated  Discuss treatment options with your caregivers to decide what care you want to receive  You always have the right to refuse treatment  The above information is an  only  It is not intended as medical advice for individual conditions or treatments  Talk to your doctor, nurse or pharmacist before following any medical regimen to see if it is safe and effective for you  © 2017 [employer] Information is for End User's use only and may not be sold, redistributed or otherwise used for commercial purposes  All illustrations and images included in CareNotes® are the copyrighted property of A [employer] M [employer]  or [employer] Enriquez

## 2022-05-25 RX ORDER — IPRATROPIUM BROMIDE AND ALBUTEROL SULFATE 2.5; .5 MG/3ML; MG/3ML
SOLUTION RESPIRATORY (INHALATION)
Refills: 0 | OUTPATIENT
Start: 2022-05-25

## 2022-05-25 NOTE — TELEPHONE ENCOUNTER
Patient's inhalers requested were not refilled since 2015  I called this patient but could not reach  Left a message asking if she requested these medications and how come now  Refused med, needs appointment  Gustabo Shaffer, will you be able to schedule an annual physical for her?

## 2022-07-06 DIAGNOSIS — E66.01 MORBID (SEVERE) OBESITY DUE TO EXCESS CALORIES (HCC): ICD-10-CM

## 2022-07-11 DIAGNOSIS — E66.01 MORBID (SEVERE) OBESITY DUE TO EXCESS CALORIES (HCC): ICD-10-CM

## 2022-07-11 RX ORDER — OMEPRAZOLE 20 MG/1
CAPSULE, DELAYED RELEASE ORAL
Qty: 90 CAPSULE | Refills: 1 | Status: SHIPPED | OUTPATIENT
Start: 2022-07-11 | End: 2022-07-12

## 2022-07-12 RX ORDER — OMEPRAZOLE 20 MG/1
CAPSULE, DELAYED RELEASE ORAL
Qty: 90 CAPSULE | Refills: 1 | Status: SHIPPED | OUTPATIENT
Start: 2022-07-12

## 2022-10-07 DIAGNOSIS — T78.40XD ALLERGY, SUBSEQUENT ENCOUNTER: ICD-10-CM

## 2022-10-07 DIAGNOSIS — F41.9 ANXIETY: ICD-10-CM

## 2022-10-07 DIAGNOSIS — G47.00 INSOMNIA, UNSPECIFIED TYPE: ICD-10-CM

## 2022-10-07 RX ORDER — LEVOCETIRIZINE DIHYDROCHLORIDE 5 MG/1
TABLET, FILM COATED ORAL
Qty: 90 TABLET | Refills: 1 | OUTPATIENT
Start: 2022-10-07

## 2022-10-10 RX ORDER — HYDROXYZINE HYDROCHLORIDE 25 MG/1
TABLET, FILM COATED ORAL
Qty: 30 TABLET | Refills: 0 | Status: SHIPPED | OUTPATIENT
Start: 2022-10-10

## 2022-11-14 DIAGNOSIS — I10 ESSENTIAL HYPERTENSION: ICD-10-CM

## 2022-11-16 RX ORDER — LOSARTAN POTASSIUM 100 MG/1
100 TABLET ORAL DAILY
Qty: 30 TABLET | Refills: 0 | Status: SHIPPED | OUTPATIENT
Start: 2022-11-16 | End: 2022-11-22

## 2022-11-18 DIAGNOSIS — I10 ESSENTIAL HYPERTENSION: ICD-10-CM

## 2022-11-22 RX ORDER — LOSARTAN POTASSIUM 100 MG/1
TABLET ORAL
Qty: 30 TABLET | Refills: 0 | Status: SHIPPED | OUTPATIENT
Start: 2022-11-22

## 2022-12-20 DIAGNOSIS — E66.01 MORBID (SEVERE) OBESITY DUE TO EXCESS CALORIES (HCC): ICD-10-CM

## 2023-01-01 DIAGNOSIS — E66.01 MORBID (SEVERE) OBESITY DUE TO EXCESS CALORIES (HCC): ICD-10-CM

## 2023-01-09 RX ORDER — OMEPRAZOLE 20 MG/1
CAPSULE, DELAYED RELEASE ORAL
Qty: 90 CAPSULE | Refills: 1 | Status: SHIPPED | OUTPATIENT
Start: 2023-01-09

## 2023-01-13 ENCOUNTER — OFFICE VISIT (OUTPATIENT)
Dept: FAMILY MEDICINE CLINIC | Facility: CLINIC | Age: 68
End: 2023-01-13

## 2023-01-13 VITALS
TEMPERATURE: 97.8 F | RESPIRATION RATE: 21 BRPM | HEART RATE: 90 BPM | DIASTOLIC BLOOD PRESSURE: 82 MMHG | WEIGHT: 206 LBS | SYSTOLIC BLOOD PRESSURE: 132 MMHG | HEIGHT: 64 IN | OXYGEN SATURATION: 97 % | BODY MASS INDEX: 35.17 KG/M2

## 2023-01-13 DIAGNOSIS — I10 ESSENTIAL HYPERTENSION: ICD-10-CM

## 2023-01-13 DIAGNOSIS — T78.40XD ALLERGY, SUBSEQUENT ENCOUNTER: ICD-10-CM

## 2023-01-13 DIAGNOSIS — J45.20 MILD INTERMITTENT ASTHMA WITHOUT COMPLICATION: ICD-10-CM

## 2023-01-13 DIAGNOSIS — J01.00 ACUTE NON-RECURRENT MAXILLARY SINUSITIS: Primary | ICD-10-CM

## 2023-01-13 RX ORDER — LEVOCETIRIZINE DIHYDROCHLORIDE 5 MG/1
TABLET, FILM COATED ORAL
Qty: 90 TABLET | Refills: 1 | Status: SHIPPED | OUTPATIENT
Start: 2023-01-13

## 2023-01-13 RX ORDER — AZITHROMYCIN 250 MG/1
TABLET, FILM COATED ORAL
Qty: 6 TABLET | Refills: 0 | Status: SHIPPED | OUTPATIENT
Start: 2023-01-13 | End: 2023-01-17

## 2023-01-13 RX ORDER — FLUTICASONE PROPIONATE 50 MCG
1 SPRAY, SUSPENSION (ML) NASAL DAILY
Qty: 11.1 ML | Refills: 3 | Status: SHIPPED | OUTPATIENT
Start: 2023-01-13

## 2023-01-13 RX ORDER — LOSARTAN POTASSIUM 100 MG/1
100 TABLET ORAL DAILY
Qty: 30 TABLET | Refills: 0 | Status: SHIPPED | OUTPATIENT
Start: 2023-01-13

## 2023-01-13 NOTE — PROGRESS NOTES
Name: Christa Caldwell      : 1955      MRN: [id number]  Encounter Provider: Gui Burnham MD  Encounter Date: 2023   Encounter department: [address] [place] [address]  Acute non-recurrent maxillary sinusitis  Assessment & Plan:  Patient with PMH of seasonal allergy and sinusitis, had not had episode in many years  Presented with URI symptoms as well as sinus pressure and pain of 10 days duration  Symptoms improved initially, but have recurred  - Advised nasal irrigation with saline followed by flonase administration   - Provided antibiotics due to length and course of her symptoms  - Patient declined augmentin, provided z-pack as alternative    Orders:  -     fluticasone (FLONASE) 50 mcg/act nasal spray; 1 spray into each nostril daily  -     azithromycin (ZITHROMAX) 250 mg tablet; Take 2 tablets today then 1 tablet daily x 4 days    2  Allergy, subsequent encounter  -     levocetirizine (XYZAL) 5 MG tablet; TAKE ONE TABLET BY MOUTH EVERY DAY DURING SEASONAL ALLERGY SEASON (GENERIC XYZAL)  -     fluticasone (FLONASE) 50 mcg/act nasal spray; 1 spray into each nostril daily    3  Essential hypertension  -     losartan (COZAAR) 100 MG tablet; Take 1 tablet (100 mg total) by mouth daily    4  Mild intermittent asthma without complication         Subjective      Sinusitis  This is a new problem  Episode onset: 10 days ago  Progression since onset: improved initially but has now recurred  There has been no fever  Associated symptoms include congestion, coughing, a hoarse voice, sinus pressure and a sore throat  Pertinent negatives include no chills or shortness of breath  Treatments tried: nyquiln vicks severe sinus, saline  Review of Systems   Constitutional: Negative for chills  HENT: Positive for congestion, hoarse voice, sinus pressure and sore throat  Respiratory: Positive for cough  Negative for shortness of breath          Current Outpatient Medications on File Prior to Visit   Medication Sig   • acetaminophen (TYLENOL) 500 mg tablet Take 500 mg by mouth every 6 (six) hours as needed for mild pain   • albuterol (PROAIR HFA) 90 mcg/act inhaler Inhale 2 puffs every 6 (six) hours as needed     • Cholecalciferol (VITAMIN D) 2000 units CAPS Take 1 capsule by mouth every evening     • famotidine (PEPCID) 20 mg/2 5 mL oral suspension Take 2 5 mL (20 mg total) by mouth 2 (two) times a day as needed for heartburn   • hydrOXYzine HCL (ATARAX) 25 mg tablet TAKE ONE TABLET BY MOUTH AT BEDTIME (GENERIC FOR ATARAX)   • ipratropium-albuterol (DUO-NEB) 0 5-2 5 mg/3 mL Take 3 mL by nebulization every 6 (six) hours as needed   (Patient not taking: Reported on 9/10/2021)   • Multiple Vitamins-Minerals (WOMENS MULTIVITAMIN PO) Take 1 tablet by mouth daily at bedtime     • omeprazole (PriLOSEC) 20 mg delayed release capsule TAKE ONE CAPSULE BY MOUTH EVERY DAY (GENERIC FOR PRILOSEC)   • omeprazole (PriLOSEC) 20 mg delayed release capsule TAKE ONE CAPSULE BY MOUTH EVERY DAY (GENERIC FOR PRILOSEC)       Objective     /82 (BP Location: Left arm, Patient Position: Sitting, Cuff Size: Large)   Pulse 90   Temp 97 8 °F (36 6 °C) (Temporal)   Resp 21   Ht 5' 4" (1 626 m)   Wt 93 4 kg (206 lb)   SpO2 97%   BMI 35 36 kg/m²     Physical Exam  Constitutional:       General: She is not in acute distress  HENT:      Head: Normocephalic and atraumatic  Right Ear: Tympanic membrane, ear canal and external ear normal  There is no impacted cerumen  Left Ear: Tympanic membrane, ear canal and external ear normal  There is no impacted cerumen  Nose: Congestion present  Right Sinus: Maxillary sinus tenderness and frontal sinus tenderness present  Left Sinus: Maxillary sinus tenderness and frontal sinus tenderness present  Comments: Post nasal drip     Mouth/Throat:      Mouth: Mucous membranes are moist       Pharynx: Posterior oropharyngeal erythema present   No oropharyngeal exudate  Cardiovascular:      Rate and Rhythm: Normal rate and regular rhythm  Heart sounds: No murmur heard  Pulmonary:      Effort: Pulmonary effort is normal  No respiratory distress  Breath sounds: Normal breath sounds  Neurological:      Mental Status: She is alert         Danelle Hernandez MD

## 2023-01-15 PROBLEM — J45.20 MILD INTERMITTENT ASTHMA WITHOUT COMPLICATION: Status: ACTIVE | Noted: 2023-01-15

## 2023-01-15 PROBLEM — J01.00 ACUTE NON-RECURRENT MAXILLARY SINUSITIS: Status: ACTIVE | Noted: 2023-01-15

## 2023-01-15 NOTE — ASSESSMENT & PLAN NOTE
Patient with PMH of seasonal allergy and sinusitis, had not had episode in many years  Presented with URI symptoms as well as sinus pressure and pain of 10 days duration  Symptoms improved initially, but have recurred     - Advised nasal irrigation with saline followed by flonase administration   - Provided antibiotics due to length and course of her symptoms  - Patient declined augmentin, provided z-pack as alternative

## 2023-01-30 ENCOUNTER — VBI (OUTPATIENT)
Dept: ADMINISTRATIVE | Facility: OTHER | Age: 68
End: 2023-01-30

## 2023-01-30 NOTE — TELEPHONE ENCOUNTER
01/30/23 8:16 AM    [employer] payor platform information validated  Patient does not have any open care gaps  Thank you    Ana María Cowart MA  [facility]

## 2023-02-08 ENCOUNTER — OFFICE VISIT (OUTPATIENT)
Dept: FAMILY MEDICINE CLINIC | Facility: CLINIC | Age: 68
End: 2023-02-08

## 2023-02-08 VITALS
BODY MASS INDEX: 36.79 KG/M2 | WEIGHT: 207.6 LBS | OXYGEN SATURATION: 98 % | HEIGHT: 63 IN | TEMPERATURE: 97.8 F | HEART RATE: 70 BPM | RESPIRATION RATE: 20 BRPM | DIASTOLIC BLOOD PRESSURE: 84 MMHG | SYSTOLIC BLOOD PRESSURE: 124 MMHG

## 2023-02-08 DIAGNOSIS — Z13.6 SCREENING FOR CARDIOVASCULAR, RESPIRATORY, AND GENITOURINARY DISEASES: ICD-10-CM

## 2023-02-08 DIAGNOSIS — Z13.29 SCREENING FOR THYROID DISORDER: ICD-10-CM

## 2023-02-08 DIAGNOSIS — Z13.89 SCREENING FOR CARDIOVASCULAR, RESPIRATORY, AND GENITOURINARY DISEASES: ICD-10-CM

## 2023-02-08 DIAGNOSIS — H93.13 TINNITUS OF BOTH EARS: ICD-10-CM

## 2023-02-08 DIAGNOSIS — I10 ESSENTIAL HYPERTENSION: ICD-10-CM

## 2023-02-08 DIAGNOSIS — Z23 NEED FOR VACCINATION: ICD-10-CM

## 2023-02-08 DIAGNOSIS — Z78.0 POST-MENOPAUSAL: ICD-10-CM

## 2023-02-08 DIAGNOSIS — J45.20 MILD INTERMITTENT ASTHMA WITHOUT COMPLICATION: Primary | ICD-10-CM

## 2023-02-08 DIAGNOSIS — Z12.11 SCREENING FOR COLON CANCER: ICD-10-CM

## 2023-02-08 DIAGNOSIS — Z13.83 SCREENING FOR CARDIOVASCULAR, RESPIRATORY, AND GENITOURINARY DISEASES: ICD-10-CM

## 2023-02-08 PROBLEM — J01.00 ACUTE NON-RECURRENT MAXILLARY SINUSITIS: Status: RESOLVED | Noted: 2023-01-15 | Resolved: 2023-02-08

## 2023-02-08 PROBLEM — J45.909 ASTHMATIC BRONCHITIS WITHOUT COMPLICATION: Status: RESOLVED | Noted: 2018-07-19 | Resolved: 2023-02-08

## 2023-02-08 RX ORDER — LOSARTAN POTASSIUM 100 MG/1
100 TABLET ORAL DAILY
Qty: 90 TABLET | Refills: 0 | Status: SHIPPED | OUTPATIENT
Start: 2023-02-08

## 2023-02-08 RX ORDER — ALBUTEROL SULFATE 90 UG/1
2 AEROSOL, METERED RESPIRATORY (INHALATION) EVERY 6 HOURS PRN
Qty: 18 G | Refills: 0 | Status: SHIPPED | OUTPATIENT
Start: 2023-02-08

## 2023-02-08 NOTE — PROGRESS NOTES
Name: El Kumar      : 1955      MRN: [id number]  Encounter Provider: June Martins MD  Encounter Date: 2023   Encounter department: [address] [place] [address]     1  Mild intermittent asthma without complication  Comments:  Stable  Has not had any flare up in a long time  Orders:  -     albuterol (PROVENTIL HFA,VENTOLIN HFA) 90 mcg/act inhaler; Inhale 2 puffs every 6 (six) hours as needed for wheezing or shortness of breath    2  Screening for cardiovascular, respiratory, and genitourinary diseases  -     CBC and differential; Future  -     Comprehensive metabolic panel; Future  -     Lipid Panel with Direct LDL reflex; Future    3  Screening for thyroid disorder  -     TSH, 3rd generation with Free T4 reflex; Future    4  Screening for colon cancer  -     Cologuard    5  Post-menopausal  -     DXA bone density spine hip and pelvis; Future; Expected date: 2023    6  Need for vaccination  -     Pneumococcal Conjugate Vaccine 20-valent (Pcv20)    7  Tinnitus of both ears  Comments:  Recommend follow up with ENT for evaluation of hearing and middle ear effusion bilaterally  Orders:  -     Ambulatory Referral to Otolaryngology; Future    8  Essential hypertension  Comments:  Controlled on losartan, continue  Orders:  -     losartan (COZAAR) 100 MG tablet; Take 1 tablet (100 mg total) by mouth daily         Subjective      HPI   Duane Leos presents today as a new patient to establish care  She is a former patient of [facility]  She has a history of asthma, hypertension, depression/anxiety, seasonal allergies  Recently has been complaining of whooshing in her ears  No pain in the ears  She does complain of some hearing loss  Review of Systems   Constitutional: Negative  HENT: Positive for tinnitus  Eyes: Negative  Respiratory: Negative  Cardiovascular: Negative  Gastrointestinal: Negative  Endocrine: Negative  Genitourinary: Negative  Musculoskeletal: Negative  Skin: Negative  Allergic/Immunologic: Negative  Neurological: Negative  Hematological: Negative  Psychiatric/Behavioral: Negative          Current Outpatient Medications on File Prior to Visit   Medication Sig   • acetaminophen (TYLENOL) 500 mg tablet Take 500 mg by mouth every 6 (six) hours as needed for mild pain   • Cholecalciferol (VITAMIN D) 2000 units CAPS Take 1 capsule by mouth every evening     • fluticasone (FLONASE) 50 mcg/act nasal spray 1 spray into each nostril daily   • hydrOXYzine HCL (ATARAX) 25 mg tablet TAKE ONE TABLET BY MOUTH AT BEDTIME (GENERIC FOR ATARAX)   • levocetirizine (XYZAL) 5 MG tablet TAKE ONE TABLET BY MOUTH EVERY DAY DURING SEASONAL ALLERGY SEASON (GENERIC XYZAL)   • Multiple Vitamins-Minerals (WOMENS MULTIVITAMIN PO) Take 1 tablet by mouth daily at bedtime     • omeprazole (PriLOSEC) 20 mg delayed release capsule TAKE ONE CAPSULE BY MOUTH EVERY DAY (GENERIC FOR PRILOSEC)   • [DISCONTINUED] albuterol (PROVENTIL HFA,VENTOLIN HFA) 90 mcg/act inhaler Inhale 2 puffs every 6 (six) hours as needed     • [DISCONTINUED] losartan (COZAAR) 100 MG tablet Take 1 tablet (100 mg total) by mouth daily   • ipratropium-albuterol (DUO-NEB) 0 5-2 5 mg/3 mL Take 3 mL by nebulization every 6 (six) hours as needed   (Patient not taking: Reported on 9/10/2021)   • [DISCONTINUED] famotidine (PEPCID) 20 mg/2 5 mL oral suspension Take 2 5 mL (20 mg total) by mouth 2 (two) times a day as needed for heartburn (Patient not taking: Reported on 2/8/2023)   • [DISCONTINUED] omeprazole (PriLOSEC) 20 mg delayed release capsule TAKE ONE CAPSULE BY MOUTH EVERY DAY (GENERIC FOR PRILOSEC) (Patient not taking: Reported on 2/8/2023)       Objective     /84   Pulse 70   Temp 97 8 °F (36 6 °C) (Temporal)   Resp 20   Ht 5' 3 25" (1 607 m)   Wt 94 2 kg (207 lb 9 6 oz)   SpO2 98%   BMI 36 48 kg/m²     Physical Exam  Constitutional:       General: She is not in acute distress  Appearance: She is well-developed  She is not diaphoretic  HENT:      Head: Normocephalic and atraumatic  Right Ear: Ear canal and external ear normal  A middle ear effusion is present  Left Ear: Ear canal and external ear normal  A middle ear effusion is present  Cardiovascular:      Rate and Rhythm: Normal rate and regular rhythm  Heart sounds: Normal heart sounds  No murmur heard  No friction rub  No gallop  Pulmonary:      Effort: Pulmonary effort is normal  No respiratory distress  Breath sounds: Normal breath sounds  No wheezing or rales  Chest:      Chest wall: No tenderness  Musculoskeletal:         General: No deformity  Normal range of motion  Cervical back: Normal range of motion and neck supple  Skin:     General: Skin is warm and dry  Neurological:      Mental Status: She is alert and oriented to person, place, and time  Psychiatric:         Behavior: Behavior normal          Thought Content: Thought content normal          Judgment: Judgment normal      BMI Counseling: Body mass index is 36 48 kg/m²  Discussed with patient's BMI with her  The BMI is above normal  Nutrition recommendations include reducing portion sizes and decreasing overall calorie intake  Exercise recommendations include moderate aerobic physical activity for 150 minutes/week      Tessie Loredo MD

## 2023-02-19 DIAGNOSIS — F41.9 ANXIETY: ICD-10-CM

## 2023-02-19 DIAGNOSIS — G47.00 INSOMNIA, UNSPECIFIED TYPE: ICD-10-CM

## 2023-02-21 RX ORDER — HYDROXYZINE HYDROCHLORIDE 25 MG/1
TABLET, FILM COATED ORAL
Qty: 30 TABLET | Refills: 0 | Status: SHIPPED | OUTPATIENT
Start: 2023-02-21

## 2023-02-21 NOTE — TELEPHONE ENCOUNTER
Patient was last seen at [facility] by a different PCP  Can you please see if she is still following us? If not, can you route the refill to their office

## 2023-02-21 NOTE — TELEPHONE ENCOUNTER
This was sent through the interface and since you were the last to prescribe it, it came to you  I will send it to her PCP

## 2023-03-19 DIAGNOSIS — F41.9 ANXIETY: ICD-10-CM

## 2023-03-19 DIAGNOSIS — G47.00 INSOMNIA, UNSPECIFIED TYPE: ICD-10-CM

## 2023-03-20 RX ORDER — HYDROXYZINE HYDROCHLORIDE 25 MG/1
TABLET, FILM COATED ORAL
Qty: 30 TABLET | Refills: 0 | Status: SHIPPED | OUTPATIENT
Start: 2023-03-20

## 2023-04-30 DIAGNOSIS — I10 ESSENTIAL HYPERTENSION: ICD-10-CM

## 2023-05-01 RX ORDER — LOSARTAN POTASSIUM 100 MG/1
TABLET ORAL
Qty: 90 TABLET | Refills: 0 | Status: SHIPPED | OUTPATIENT
Start: 2023-05-01

## 2023-05-07 DIAGNOSIS — I10 ESSENTIAL HYPERTENSION: ICD-10-CM

## 2023-05-08 RX ORDER — LOSARTAN POTASSIUM 100 MG/1
TABLET ORAL
Qty: 90 TABLET | Refills: 0 | Status: SHIPPED | OUTPATIENT
Start: 2023-05-08 | End: 2023-05-15

## 2023-05-13 DIAGNOSIS — I10 ESSENTIAL HYPERTENSION: ICD-10-CM

## 2023-05-15 RX ORDER — LOSARTAN POTASSIUM 100 MG/1
TABLET ORAL
Qty: 90 TABLET | Refills: 0 | Status: SHIPPED | OUTPATIENT
Start: 2023-05-15 | End: 2023-05-19

## 2023-05-15 NOTE — TELEPHONE ENCOUNTER
Requested medication(s) are due for refill today: No  Patient has already received a courtesy refill: No  Other reason request has been forwarded to provider: Rx sent to pharmacy 7 days ago

## 2023-05-18 DIAGNOSIS — I10 ESSENTIAL HYPERTENSION: ICD-10-CM

## 2023-05-19 RX ORDER — LOSARTAN POTASSIUM 100 MG/1
TABLET ORAL
Qty: 90 TABLET | Refills: 0 | Status: SHIPPED | OUTPATIENT
Start: 2023-05-19 | End: 2023-05-22 | Stop reason: SDUPTHER

## 2023-05-22 DIAGNOSIS — I10 ESSENTIAL HYPERTENSION: ICD-10-CM

## 2023-05-22 RX ORDER — LOSARTAN POTASSIUM 100 MG/1
100 TABLET ORAL DAILY
Qty: 90 TABLET | Refills: 0 | Status: SHIPPED | OUTPATIENT
Start: 2023-05-22 | End: 2023-05-24

## 2023-05-24 DIAGNOSIS — I10 ESSENTIAL HYPERTENSION: ICD-10-CM

## 2023-05-24 RX ORDER — LOSARTAN POTASSIUM 100 MG/1
TABLET ORAL
Qty: 90 TABLET | Refills: 0 | Status: SHIPPED | OUTPATIENT
Start: 2023-05-24 | End: 2023-05-25

## 2023-05-25 DIAGNOSIS — I10 ESSENTIAL HYPERTENSION: ICD-10-CM

## 2023-05-25 RX ORDER — LOSARTAN POTASSIUM 100 MG/1
TABLET ORAL
Qty: 90 TABLET | Refills: 0 | Status: SHIPPED | OUTPATIENT
Start: 2023-05-25 | End: 2023-05-26

## 2023-05-26 RX ORDER — LOSARTAN POTASSIUM 100 MG/1
TABLET ORAL
Qty: 90 TABLET | Refills: 0 | Status: SHIPPED | OUTPATIENT
Start: 2023-05-26

## 2023-06-11 DIAGNOSIS — F41.9 ANXIETY: ICD-10-CM

## 2023-06-11 DIAGNOSIS — G47.00 INSOMNIA, UNSPECIFIED TYPE: ICD-10-CM

## 2023-06-12 RX ORDER — HYDROXYZINE HYDROCHLORIDE 25 MG/1
TABLET, FILM COATED ORAL
Qty: 30 TABLET | Refills: 1 | Status: SHIPPED | OUTPATIENT
Start: 2023-06-12

## 2023-07-21 DIAGNOSIS — E66.01 MORBID (SEVERE) OBESITY DUE TO EXCESS CALORIES (HCC): ICD-10-CM

## 2023-07-24 DIAGNOSIS — T78.40XD ALLERGY, SUBSEQUENT ENCOUNTER: ICD-10-CM

## 2023-07-24 RX ORDER — LEVOCETIRIZINE DIHYDROCHLORIDE 5 MG/1
TABLET, FILM COATED ORAL
Qty: 90 TABLET | Refills: 1 | Status: SHIPPED | OUTPATIENT
Start: 2023-07-24

## 2023-07-24 RX ORDER — OMEPRAZOLE 20 MG/1
CAPSULE, DELAYED RELEASE ORAL
Qty: 90 CAPSULE | Refills: 1 | Status: SHIPPED | OUTPATIENT
Start: 2023-07-24

## 2023-10-06 DIAGNOSIS — E66.01 MORBID (SEVERE) OBESITY DUE TO EXCESS CALORIES (HCC): ICD-10-CM

## 2023-10-09 RX ORDER — OMEPRAZOLE 20 MG/1
CAPSULE, DELAYED RELEASE ORAL
Qty: 90 CAPSULE | Refills: 1 | Status: SHIPPED | OUTPATIENT
Start: 2023-10-09

## 2023-11-11 DIAGNOSIS — F41.9 ANXIETY: ICD-10-CM

## 2023-11-11 DIAGNOSIS — G47.00 INSOMNIA, UNSPECIFIED TYPE: ICD-10-CM

## 2023-11-13 RX ORDER — HYDROXYZINE HYDROCHLORIDE 25 MG/1
TABLET, FILM COATED ORAL
Qty: 30 TABLET | Refills: 1 | Status: SHIPPED | OUTPATIENT
Start: 2023-11-13

## 2023-12-04 ENCOUNTER — OFFICE VISIT (OUTPATIENT)
Dept: FAMILY MEDICINE CLINIC | Facility: CLINIC | Age: 68
End: 2023-12-04
Payer: COMMERCIAL

## 2023-12-04 VITALS
HEART RATE: 60 BPM | BODY MASS INDEX: 38.34 KG/M2 | DIASTOLIC BLOOD PRESSURE: 82 MMHG | TEMPERATURE: 98.1 F | SYSTOLIC BLOOD PRESSURE: 128 MMHG | HEIGHT: 63 IN | RESPIRATION RATE: 16 BRPM | WEIGHT: 216.4 LBS

## 2023-12-04 DIAGNOSIS — F32.1 MODERATE MAJOR DEPRESSION (HCC): ICD-10-CM

## 2023-12-04 DIAGNOSIS — F41.9 ANXIETY: Primary | ICD-10-CM

## 2023-12-04 DIAGNOSIS — M17.0 OSTEOARTHRITIS OF BOTH KNEES, UNSPECIFIED OSTEOARTHRITIS TYPE: ICD-10-CM

## 2023-12-04 DIAGNOSIS — M54.50 CHRONIC LOW BACK PAIN, UNSPECIFIED BACK PAIN LATERALITY, UNSPECIFIED WHETHER SCIATICA PRESENT: ICD-10-CM

## 2023-12-04 DIAGNOSIS — G89.29 CHRONIC LOW BACK PAIN, UNSPECIFIED BACK PAIN LATERALITY, UNSPECIFIED WHETHER SCIATICA PRESENT: ICD-10-CM

## 2023-12-04 DIAGNOSIS — I10 BENIGN ESSENTIAL HYPERTENSION: ICD-10-CM

## 2023-12-04 PROBLEM — F41.8 DEPRESSION WITH ANXIETY: Status: RESOLVED | Noted: 2018-04-05 | Resolved: 2023-12-04

## 2023-12-04 PROCEDURE — 99214 OFFICE O/P EST MOD 30 MIN: CPT | Performed by: FAMILY MEDICINE

## 2023-12-04 RX ORDER — ESCITALOPRAM OXALATE 5 MG/1
5 TABLET ORAL DAILY
Qty: 30 TABLET | Refills: 1 | Status: SHIPPED | OUTPATIENT
Start: 2023-12-04

## 2023-12-04 RX ORDER — LORAZEPAM 0.5 MG/1
0.5 TABLET ORAL DAILY PRN
Qty: 30 TABLET | Refills: 0 | Status: SHIPPED | OUTPATIENT
Start: 2023-12-04

## 2023-12-04 NOTE — ASSESSMENT & PLAN NOTE
She was on ativan a couple of years ago and states it is the only thing that has helped her anxiety. Has not tried SSRI medications. I did let her know risks of benzodiazepine use. I let her know that I do not feel comfortable prescribing this for her long term, but I gave a 30 pill supply for severe anxiety until lexapro kicks in. Start low dose lexapro. Follow up in 6 weeks.

## 2023-12-04 NOTE — PROGRESS NOTES
Name: Suri Gayle      : 1955      MRN: [id number]  Encounter Provider: Bhavya Marte MD  Encounter Date: 2023   Encounter department: [facility]     1. Anxiety  Assessment & Plan:  She was on ativan a couple of years ago and states it is the only thing that has helped her anxiety. Has not tried SSRI medications. I did let her know risks of benzodiazepine use. I let her know that I do not feel comfortable prescribing this for her long term, but I gave a 30 pill supply for severe anxiety until lexapro kicks in. Start low dose lexapro. Follow up in 6 weeks. Orders:  -     Ambulatory referral to [employer]; Future  -     escitalopram (LEXAPRO) 5 mg tablet; Take 1 tablet (5 mg total) by mouth daily  -     LORazepam (Ativan) 0.5 mg tablet; Take 1 tablet (0.5 mg total) by mouth daily as needed for anxiety    2. Osteoarthritis of both knees, unspecified osteoarthritis type  Comments:  History of osteoarthritis in both knees. No recent scans. Shed like to establish with ortho. Orders:  -     Ambulatory Referral to Orthopedic Surgery; Future  -     Ambulatory Referral to Physical Therapy; Future    3. Moderate major depression ([address])  Assessment & Plan: Will start lexapro. Recommend therapy which she will look into. F/u in 6 weeks. 4. Benign essential hypertension  Assessment & Plan:  Controlled on losartan. 5. Chronic low back pain, unspecified back pain laterality, unspecified whether sciatica present  -     Ambulatory Referral to Physical Therapy; Future           Subjective      HPI  Govind Gao is here today for follow up. She has a history of bilateral knee osteoarthritis. Reports having imaging done years ago. Her knees have been bothering her a lot lately. Would like to see ortho. She also reports worsening anxiety due to recent stressors in her life such as her husbands mental health. She would like to go back on Ativan.    She also would like to start physical therapy for chronic low back pain. PHQ-2/9 Depression Screening    Little interest or pleasure in doing things: 1 - several days  Feeling down, depressed, or hopeless: 1 - several days  Trouble falling or staying asleep, or sleeping too much: 3 - nearly every day  Feeling tired or having little energy: 1 - several days  Poor appetite or overeatin - several days  Feeling bad about yourself - or that you are a failure or have let yourself or your family down: 2 - more than half the days  Trouble concentrating on things, such as reading the newspaper or watching television: 1 - several days  Moving or speaking so slowly that other people could have noticed. Or the opposite - being so fidgety or restless that you have been moving around a lot more than usual: 1 - several days  Thoughts that you would be better off dead, or of hurting yourself in some way: 0 - not at all  PHQ-9 Score: 11   PHQ-9 Interpretation: Moderate depression           MAHESH-7 Flowsheet Screening      Flowsheet Row Most Recent Value   Over the last 2 weeks, how often have you been bothered by any of the following problems? Feeling nervous, anxious, or on edge 2   Not being able to stop or control worrying 1   Worrying too much about different things 2   Trouble relaxing 2   Being so restless that it is hard to sit still 1   Becoming easily annoyed or irritable 0   Feeling afraid as if something awful might happen 1   MAHESH-7 Total Score 9           Review of Systems   Constitutional: Negative. HENT: Negative. Eyes: Negative. Respiratory: Negative. Cardiovascular: Negative. Gastrointestinal: Negative. Endocrine: Negative. Genitourinary: Negative. Musculoskeletal:  Positive for arthralgias and back pain. Skin: Negative. Allergic/Immunologic: Negative. Neurological: Negative. Hematological: Negative. Psychiatric/Behavioral:  Positive for dysphoric mood. The patient is nervous/anxious. Current Outpatient Medications on File Prior to Visit   Medication Sig    acetaminophen (TYLENOL) 500 mg tablet Take 500 mg by mouth every 6 (six) hours as needed for mild pain    albuterol (PROVENTIL HFA,VENTOLIN HFA) 90 mcg/act inhaler Inhale 2 puffs every 6 (six) hours as needed for wheezing or shortness of breath    Cholecalciferol (VITAMIN D) 2000 units CAPS Take 1 capsule by mouth every evening      fluticasone (FLONASE) 50 mcg/act nasal spray 1 spray into each nostril daily    hydrOXYzine HCL (ATARAX) 25 mg tablet TAKE ONE TABLET BY MOUTH AT BEDTIME (GENERIC FOR ATARAX)    levocetirizine (XYZAL) 5 MG tablet TAKE ONE TABLET BY MOUTH EVERY DAY DURING SEASONAL ALLERGY SEASON (GENERIC FOR XYZAL)    losartan (COZAAR) 100 MG tablet TAKE ONE TABLET BY MOUTH EVERY DAY    Multiple Vitamins-Minerals (WOMENS MULTIVITAMIN PO) Take 1 tablet by mouth daily at bedtime      omeprazole (PriLOSEC) 20 mg delayed release capsule TAKE ONE CAPSULE BY MOUTH EVERY DAY (GENERIC FOR PRILOSEC)    ipratropium-albuterol (DUO-NEB) 0.5-2.5 mg/3 mL Take 3 mL by nebulization every 6 (six) hours as needed   (Patient not taking: Reported on 9/10/2021)       Objective     /82   Pulse 60   Temp 98.1 °F (36.7 °C)   Resp 16   Ht 5' 3.25" (1.607 m)   Wt 98.2 kg (216 lb 6.4 oz)   BMI 38.03 kg/m²     Physical Exam  Constitutional:       General: She is not in acute distress. Appearance: She is well-developed. She is not diaphoretic. HENT:      Head: Normocephalic and atraumatic. Cardiovascular:      Rate and Rhythm: Normal rate and regular rhythm. Heart sounds: Normal heart sounds. No murmur heard. No friction rub. No gallop. Pulmonary:      Effort: Pulmonary effort is normal. No respiratory distress. Breath sounds: Normal breath sounds. No wheezing or rales. Chest:      Chest wall: No tenderness. Musculoskeletal:         General: No deformity. Normal range of motion.       Cervical back: Normal range of motion and neck supple. Skin:     General: Skin is warm and dry. Neurological:      Mental Status: She is alert and oriented to person, place, and time. Psychiatric:         Behavior: Behavior normal.         Thought Content:  Thought content normal.         Judgment: Judgment normal.       Ovi Fiore MD

## 2023-12-20 ENCOUNTER — TELEPHONE (OUTPATIENT)
Dept: PSYCHIATRY | Facility: CLINIC | Age: 68
End: 2023-12-20

## 2023-12-20 NOTE — TELEPHONE ENCOUNTER
-Called pt in regards to referral rcvd, verify needs of services and inform of current wait list. No answer, lvm for pt to call back.  Closing referral due to no contact w/pt.

## 2024-01-16 ENCOUNTER — EVALUATION (OUTPATIENT)
Dept: PHYSICAL THERAPY | Facility: CLINIC | Age: 69
End: 2024-01-16
Payer: COMMERCIAL

## 2024-01-16 DIAGNOSIS — M17.0 OSTEOARTHRITIS OF BOTH KNEES, UNSPECIFIED OSTEOARTHRITIS TYPE: Primary | ICD-10-CM

## 2024-01-16 DIAGNOSIS — G89.29 CHRONIC LOW BACK PAIN, UNSPECIFIED BACK PAIN LATERALITY, UNSPECIFIED WHETHER SCIATICA PRESENT: ICD-10-CM

## 2024-01-16 DIAGNOSIS — M54.50 CHRONIC LOW BACK PAIN, UNSPECIFIED BACK PAIN LATERALITY, UNSPECIFIED WHETHER SCIATICA PRESENT: ICD-10-CM

## 2024-01-16 PROCEDURE — 97110 THERAPEUTIC EXERCISES: CPT | Performed by: PHYSICAL THERAPIST

## 2024-01-16 PROCEDURE — 97530 THERAPEUTIC ACTIVITIES: CPT | Performed by: PHYSICAL THERAPIST

## 2024-01-16 PROCEDURE — 97161 PT EVAL LOW COMPLEX 20 MIN: CPT | Performed by: PHYSICAL THERAPIST

## 2024-01-16 NOTE — PROGRESS NOTES
PT Evaluation     Today's date: 2024  Patient name: Casey Machado  : 1955  MRN: [id number]  Referring provider: Joycelyn Srivastava MD  Dx:   Encounter Diagnosis     ICD-10-CM    1. Osteoarthritis of both knees, unspecified osteoarthritis type  M17.0 Ambulatory Referral to Physical Therapy    History of osteoarthritis in both knees. No recent scans. Shed like to establish with ortho.      2. Chronic low back pain, unspecified back pain laterality, unspecified whether sciatica present  M54.50 Ambulatory Referral to Physical Therapy    G89.29           Start Time: 1545  Stop Time: 1625  Total time in clinic (min): 40 minutes    Assessment  Assessment details: Casey Machado is a 68 y.o. female who presents with complaints of Osteoarthritis of both knees, unspecified osteoarthritis type  (primary encounter diagnosis)  Chronic low back pain, unspecified back pain laterality, unspecified whether sciatica present.  No further referral appears necessary at this time based upon examination results.  Patient is presenting with decreased knee ROM and lacking full right terminal knee extension as well as decreased lumbar extension ROM leading to limitations with walking, standing, transitions, sleeping, and stair navigation. Prognosis is good given HEP compliance and PT 2x/wk.  Patient educated on findings, role of HEP, and role of seated lumbar support. Positive prognostic indicators include positive attitude toward recovery.   Please contact me if you have any questions or recommendations.  Thank you for the opportunity to share in Casey's care.       Impairments: abnormal muscle firing, abnormal muscle tone, abnormal or restricted ROM, abnormal movement, impaired physical strength, lacks appropriate home exercise program, pain with function, poor posture  and poor body mechanics    Symptom irritability: lowUnderstanding of Dx/Px/POC: good   Prognosis: good    Plan  Patient would benefit from: skilled physical  therapy  Planned therapy interventions: joint mobilization, manual therapy, patient education, postural training, strengthening, stretching, therapeutic activities, therapeutic exercise, home exercise program, neuromuscular re-education, flexibility, functional ROM exercises and abdominal trunk stabilization  Frequency: 2x week  Duration in weeks: 8  Treatment plan discussed with: patient        Subjective Evaluation    History of Present Illness  Mechanism of injury: Patient presenting to therapy with bilateral knee pain, R>L. Had injury to right knee in 2017 and had therapy in the past which helped.Left knee pain started when she fell down steps about 10 years ago. Less mobility in RLE compared to LLE and wakes with stiffness in the morning. B/L knee achiness that improved with movement. Performs stairs with step to gait pattern at work. 5 steps to enter home and second floor as well with steps to basement. Uses railings as well. Patient denies bowel/bladder dysfunction, saddle paraesthesias, nor cough/sneeze provocation. Weather can effect knees. Limited with walking tolerance (due to knee and back).     Has had chiropractic care in the past. Had sciatica years ago. No sleep disturbances due to back. Does have discomfort when sitting for long periods of time. When sitting for long periods of time and transitions to standing will feel back discomfort as well as has back discomfort with standing.                Recurrent probem    Quality of life: good    Patient Goals  Patient goals for therapy: decreased pain, increased motion, increased strength and independence with ADLs/IADLs  Patient goal: patient would like to return to exercising for health  Pain  Pain scale: back-0, knees 0.  Pain scale at highest: knees- 5/10, back 5/10.  Quality: knees- achiness/stiffness, back- sharp.  Alleviating factors: Tylenol, heating pad.  Aggravating factors: standing and lifting    Treatments  Previous treatment:  chiropractic      Short Term:  Pt will report decreased levels of pain by at least 2 subjective ratings in 4 weeks  Pt will demonstrate improved ROM by at least 10 degrees in 4 weeks  Pt will demonstrate improved strength by 1/2 grade in 4 weeks.  Pt will be able to walk >15 minutes without limitations in 4 weeks  Long Term:   Pt will be independent in their HEP in 8 weeks  Pt will be pain free with IADL's in 8 weeks.  Pt will display 5/5 MMT quadriceps in 8 weeks.  Pt will be able to sleep without disturbances in 8 weeks     Objective  GAIT: fwd trunk lean, right knee flexed with decreased step length  Squat assess: WNL  SLS: RLE: unable, LLE: unable           MMT    Hip         R          L   Flex. 4 4   Extn. 4 4   Abd. 4 4                 MMT         AROM         PROM    Knee      R          L         R          L           R         L   Flex. 5 5 93 115 95 120   Extn. 4 4 -10 0 -7 WNL                         MMT    Ankle         R          L   PF 5 5   DF. 5 5   EHL 5 5   Inv. 5 5   Ever. 5 5     Posture: poor sitting posture, able to self correct  Palpation: medial joint line LLE    Slump test: L=  -   R=  -     Straight leg raise:   L=  -  R= -       Knee: post drawer =  - Lachman’s test= -  anterior draw test=  -  Valgus stress test= -  varus stress test = -   joint findings= decreased tibiofemoral mobility RLE    Lumbar  % of normal   Flex. 100   Extn. 25   SB Left 75   SB Right 75   ROT Left 75   ROT Right 75   Repetitive testing: extension in standing= better  extension in lying= deferred  flexion standing= ache        Insurance:  AMA/CMS Eval/ Re-eval POC expires FOTO Auth #/ Referral # Total    Start date  Expiration date Visit limitation?  PT only or  PT+OT? Co-Insurance   AMA 1.16.24 3.12.24  Not needed    BOMN                    Precautions: asthma, HTN, standard  Patient provided verbal consent to treatment plan and recommended interventions.    Manuals 1.16            visit 1                                                    Neuro Re-Ed                                                    Ther Ex             HARLEY 2*10            SLR flexion 3*10 ea.            Rep. Knee extn with OP 20x                                                                             Ther Activity             Pt ed FB

## 2024-01-23 ENCOUNTER — OFFICE VISIT (OUTPATIENT)
Dept: PHYSICAL THERAPY | Facility: CLINIC | Age: 69
End: 2024-01-23
Payer: COMMERCIAL

## 2024-01-23 DIAGNOSIS — M17.0 OSTEOARTHRITIS OF BOTH KNEES, UNSPECIFIED OSTEOARTHRITIS TYPE: Primary | ICD-10-CM

## 2024-01-23 DIAGNOSIS — M54.50 CHRONIC LOW BACK PAIN, UNSPECIFIED BACK PAIN LATERALITY, UNSPECIFIED WHETHER SCIATICA PRESENT: ICD-10-CM

## 2024-01-23 DIAGNOSIS — G89.29 CHRONIC LOW BACK PAIN, UNSPECIFIED BACK PAIN LATERALITY, UNSPECIFIED WHETHER SCIATICA PRESENT: ICD-10-CM

## 2024-01-23 PROCEDURE — 97110 THERAPEUTIC EXERCISES: CPT | Performed by: PHYSICAL THERAPIST

## 2024-01-23 NOTE — PROGRESS NOTES
"Daily Note     Today's date: 2024  Patient name: Casey Machado  : 1955  MRN: [id number]  Referring provider: Joycelyn Srivastava MD  Dx:   Encounter Diagnosis     ICD-10-CM    1. Osteoarthritis of both knees, unspecified osteoarthritis type  M17.0       2. Chronic low back pain, unspecified back pain laterality, unspecified whether sciatica present  M54.50     G89.29                Subjective: Patient reports fair compliance with HEP. Reports performing HARLEY at home.    Objective: See treatment diary below    Assessment: Tolerated treatment well. Patient did well with treatment rendered today with more upright posture demonstrated after HARLEY. No reporting of knee irritation during session.     Plan: Continue per plan of care.      Insurance:  AMA/CMS Eval/ Re-eval POC expires FOTO Auth #/ Referral # Total    Start date  Expiration date Visit limitation?  PT only or  PT+OT? Co-Insurance   AMA 16 3  Not needed    BOMN                    Precautions: asthma, HTN, standard  Patient provided verbal consent to treatment plan and recommended interventions.    Manuals 1.16 1.23           visit 1 2                                                  Neuro Re-Ed                                                    Ther Ex             HARLEY 2*10 2*10           SLR flexion 3*10 ea. HEP           Rep. Knee extn with OP 20x HEP           Calf raises  3*10           squats  3*10           Nu step- st 8  8' lvl 2           Step up  2*10, 6\" ea.           Lateral step up  2*10, 4\" ea.           Side stepping  2*3 laps 12'                                                                                         Ther Activity             Pt ed FB                              "

## 2024-01-25 ENCOUNTER — OFFICE VISIT (OUTPATIENT)
Dept: PHYSICAL THERAPY | Facility: CLINIC | Age: 69
End: 2024-01-25
Payer: COMMERCIAL

## 2024-01-25 DIAGNOSIS — G89.29 CHRONIC LOW BACK PAIN, UNSPECIFIED BACK PAIN LATERALITY, UNSPECIFIED WHETHER SCIATICA PRESENT: ICD-10-CM

## 2024-01-25 DIAGNOSIS — M54.50 CHRONIC LOW BACK PAIN, UNSPECIFIED BACK PAIN LATERALITY, UNSPECIFIED WHETHER SCIATICA PRESENT: ICD-10-CM

## 2024-01-25 DIAGNOSIS — M17.0 OSTEOARTHRITIS OF BOTH KNEES, UNSPECIFIED OSTEOARTHRITIS TYPE: Primary | ICD-10-CM

## 2024-01-25 PROCEDURE — 97110 THERAPEUTIC EXERCISES: CPT | Performed by: PHYSICAL THERAPIST

## 2024-01-25 NOTE — PROGRESS NOTES
"Daily Note     Today's date: 2024  Patient name: Casey Machado  : 1955  MRN: [id number]  Referring provider: Joycelyn Srivastava MD  Dx:   Encounter Diagnosis     ICD-10-CM    1. Osteoarthritis of both knees, unspecified osteoarthritis type  M17.0       2. Chronic low back pain, unspecified back pain laterality, unspecified whether sciatica present  M54.50     G89.29                Subjective: Patient reports doing okay entering treatment today. Reports better compliance with HEP.     Objective: See treatment diary below    Assessment: Tolerated treatment well. Patient reported being challenged and decreased hip flexion noted when trying to progress anterior step ups to 8'' with RLE.     Plan: Continue per plan of care.      Insurance:  AMA/CMS Eval/ Re-eval POC expires FOTO Auth #/ Referral # Total    Start date  Expiration date Visit limitation?  PT only or  PT+OT? Co-Insurance   AMA 1.16. 3.1224  Not needed    BOMN                    Precautions: asthma, HTN, standard  Patient provided verbal consent to treatment plan and recommended interventions.    Manuals 1.16 1.23 1.25       visit 1 2 3                                     Neuro Re-Ed          Low rows   3*10 GTB                           Ther Ex          HARLEY 2*10 2*10 2*10       SLR flexion 3*10 ea. HEP 2*10 ea.       Rep. Knee extn with OP 20x HEP        Calf raises  3*10 3*10       squats  3*10 3*10       Nu step- st 8  8' lvl 2 8' lvl 3       Step up  2*10, 6\" ea. 2*10, 6\" ea.       Lateral step up  2*10, 4\" ea. 3*10, 4\" ea.       Side stepping  2*3 laps 12' 2*3 laps 12'                                                                   Ther Activity          Pt ed FB                        "

## 2024-01-26 ENCOUNTER — APPOINTMENT (OUTPATIENT)
Dept: LAB | Facility: HOSPITAL | Age: 69
End: 2024-01-26
Attending: FAMILY MEDICINE
Payer: COMMERCIAL

## 2024-01-26 DIAGNOSIS — Z13.29 SCREENING FOR THYROID DISORDER: ICD-10-CM

## 2024-01-26 DIAGNOSIS — Z13.89 SCREENING FOR CARDIOVASCULAR, RESPIRATORY, AND GENITOURINARY DISEASES: ICD-10-CM

## 2024-01-26 DIAGNOSIS — Z13.83 SCREENING FOR CARDIOVASCULAR, RESPIRATORY, AND GENITOURINARY DISEASES: ICD-10-CM

## 2024-01-26 DIAGNOSIS — Z13.6 SCREENING FOR CARDIOVASCULAR, RESPIRATORY, AND GENITOURINARY DISEASES: ICD-10-CM

## 2024-01-26 LAB
ALBUMIN SERPL BCP-MCNC: 4.1 G/DL (ref 3.5–5)
ALP SERPL-CCNC: 86 U/L (ref 34–104)
ALT SERPL W P-5'-P-CCNC: 13 U/L (ref 7–52)
ANION GAP SERPL CALCULATED.3IONS-SCNC: 4 MMOL/L
AST SERPL W P-5'-P-CCNC: 17 U/L (ref 13–39)
BASOPHILS # BLD AUTO: 0.04 THOUSANDS/ÂΜL (ref 0–0.1)
BASOPHILS NFR BLD AUTO: 1 % (ref 0–1)
BILIRUB SERPL-MCNC: 0.6 MG/DL (ref 0.2–1)
BUN SERPL-MCNC: 22 MG/DL (ref 5–25)
CALCIUM SERPL-MCNC: 9.1 MG/DL (ref 8.4–10.2)
CHLORIDE SERPL-SCNC: 103 MMOL/L (ref 96–108)
CHOLEST SERPL-MCNC: 257 MG/DL
CO2 SERPL-SCNC: 29 MMOL/L (ref 21–32)
CREAT SERPL-MCNC: 0.59 MG/DL (ref 0.6–1.3)
EOSINOPHIL # BLD AUTO: 0.24 THOUSAND/ÂΜL (ref 0–0.61)
EOSINOPHIL NFR BLD AUTO: 5 % (ref 0–6)
ERYTHROCYTE [DISTWIDTH] IN BLOOD BY AUTOMATED COUNT: 12.9 % (ref 11.6–15.1)
GFR SERPL CREATININE-BSD FRML MDRD: 95 ML/MIN/1.73SQ M
GLUCOSE P FAST SERPL-MCNC: 86 MG/DL (ref 65–99)
HCT VFR BLD AUTO: 38.6 % (ref 34.8–46.1)
HDLC SERPL-MCNC: 113 MG/DL
HGB BLD-MCNC: 12.8 G/DL (ref 11.5–15.4)
IMM GRANULOCYTES # BLD AUTO: 0.01 THOUSAND/UL (ref 0–0.2)
IMM GRANULOCYTES NFR BLD AUTO: 0 % (ref 0–2)
LDLC SERPL CALC-MCNC: 129 MG/DL (ref 0–100)
LYMPHOCYTES # BLD AUTO: 1.81 THOUSANDS/ÂΜL (ref 0.6–4.47)
LYMPHOCYTES NFR BLD AUTO: 38 % (ref 14–44)
MCH RBC QN AUTO: 33.5 PG (ref 26.8–34.3)
MCHC RBC AUTO-ENTMCNC: 33.2 G/DL (ref 31.4–37.4)
MCV RBC AUTO: 101 FL (ref 82–98)
MONOCYTES # BLD AUTO: 0.46 THOUSAND/ÂΜL (ref 0.17–1.22)
MONOCYTES NFR BLD AUTO: 10 % (ref 4–12)
NEUTROPHILS # BLD AUTO: 2.26 THOUSANDS/ÂΜL (ref 1.85–7.62)
NEUTS SEG NFR BLD AUTO: 46 % (ref 43–75)
NRBC BLD AUTO-RTO: 0 /100 WBCS
PLATELET # BLD AUTO: 221 THOUSANDS/UL (ref 149–390)
PMV BLD AUTO: 9.1 FL (ref 8.9–12.7)
POTASSIUM SERPL-SCNC: 4.1 MMOL/L (ref 3.5–5.3)
PROT SERPL-MCNC: 7 G/DL (ref 6.4–8.4)
RBC # BLD AUTO: 3.82 MILLION/UL (ref 3.81–5.12)
SODIUM SERPL-SCNC: 136 MMOL/L (ref 135–147)
TRIGL SERPL-MCNC: 74 MG/DL
TSH SERPL DL<=0.05 MIU/L-ACNC: 2.22 UIU/ML (ref 0.45–4.5)
WBC # BLD AUTO: 4.82 THOUSAND/UL (ref 4.31–10.16)

## 2024-01-26 PROCEDURE — 80061 LIPID PANEL: CPT

## 2024-01-26 PROCEDURE — 36415 COLL VENOUS BLD VENIPUNCTURE: CPT

## 2024-01-26 PROCEDURE — 80053 COMPREHEN METABOLIC PANEL: CPT

## 2024-01-26 PROCEDURE — 84443 ASSAY THYROID STIM HORMONE: CPT

## 2024-01-26 PROCEDURE — 85025 COMPLETE CBC W/AUTO DIFF WBC: CPT

## 2024-01-27 DIAGNOSIS — T78.40XD ALLERGY, SUBSEQUENT ENCOUNTER: ICD-10-CM

## 2024-01-29 ENCOUNTER — OFFICE VISIT (OUTPATIENT)
Dept: PHYSICAL THERAPY | Facility: CLINIC | Age: 69
End: 2024-01-29
Payer: COMMERCIAL

## 2024-01-29 ENCOUNTER — APPOINTMENT (OUTPATIENT)
Dept: PHYSICAL THERAPY | Facility: CLINIC | Age: 69
End: 2024-01-29
Payer: COMMERCIAL

## 2024-01-29 DIAGNOSIS — G89.29 CHRONIC LOW BACK PAIN, UNSPECIFIED BACK PAIN LATERALITY, UNSPECIFIED WHETHER SCIATICA PRESENT: ICD-10-CM

## 2024-01-29 DIAGNOSIS — M17.0 OSTEOARTHRITIS OF BOTH KNEES, UNSPECIFIED OSTEOARTHRITIS TYPE: Primary | ICD-10-CM

## 2024-01-29 DIAGNOSIS — M54.50 CHRONIC LOW BACK PAIN, UNSPECIFIED BACK PAIN LATERALITY, UNSPECIFIED WHETHER SCIATICA PRESENT: ICD-10-CM

## 2024-01-29 PROCEDURE — 97110 THERAPEUTIC EXERCISES: CPT | Performed by: PHYSICAL THERAPIST

## 2024-01-29 PROCEDURE — 97112 NEUROMUSCULAR REEDUCATION: CPT | Performed by: PHYSICAL THERAPIST

## 2024-01-29 NOTE — PROGRESS NOTES
"Daily Note     Today's date: 2024  Patient name: Casey Machado  : 1955  MRN: [id number]  Referring provider: Joycelyn Srivastava MD  Dx:   Encounter Diagnosis     ICD-10-CM    1. Osteoarthritis of both knees, unspecified osteoarthritis type  M17.0       2. Chronic low back pain, unspecified back pain laterality, unspecified whether sciatica present  M54.50     G89.29                Subjective: Patient reports slight ache in her back that she had this morning. No reporting of other irritation at this time.   1 on 1 with PT for 20 minutes. Remaining time independent fitness program.    Objective: See treatment diary below    Assessment: Tolerated treatment well. Patient reported okay with treatment rendered today. Cueing needed for proper squat mechanics to avoid knee irritation.     Plan: Continue per plan of care.      Insurance:  AMA/CMS Eval/ Re-eval POC expires FOTO Auth #/ Referral # Total    Start date  Expiration date Visit limitation?  PT only or  PT+OT? Co-Insurance   AMA 1.16.24 3.12.24  Not needed    BOMN                    Precautions: asthma, HTN, standard  Patient provided verbal consent to treatment plan and recommended interventions.    Manuals 1.16 1.23 1.25 1.29      visit 1 2 3 4                                    Neuro Re-Ed          Low rows   3*10 GTB 3*10 Black TB      bridge    3*10                Ther Ex          HARLEY 2*10 2*10 2*10 2*10      SLR flexion 3*10 ea. HEP 2*10 ea. 3*10 ea.      Rep. Knee extn with OP 20x HEP        Calf raises  3*10 3*10 3*10      squats  3*10 3*10 3*10      Nu step- st 8  8' lvl 2 8' lvl 3 5' lvl 3      Step up  2*10, 6\" ea. 2*10, 6\" ea.       Lateral step up  2*10, 4\" ea. 3*10, 4\" ea. 2*15, 4\" ea.      Side stepping  2*3 laps 12' 2*3 laps 12' 2*3 laps 12'                                                                  Ther Activity          Pt ed FB                        "

## 2024-01-30 ENCOUNTER — TELEPHONE (OUTPATIENT)
Age: 69
End: 2024-01-30

## 2024-01-30 ENCOUNTER — OFFICE VISIT (OUTPATIENT)
Dept: FAMILY MEDICINE CLINIC | Facility: CLINIC | Age: 69
End: 2024-01-30
Payer: COMMERCIAL

## 2024-01-30 VITALS
HEART RATE: 77 BPM | RESPIRATION RATE: 16 BRPM | WEIGHT: 218 LBS | DIASTOLIC BLOOD PRESSURE: 78 MMHG | BODY MASS INDEX: 40.12 KG/M2 | HEIGHT: 62 IN | TEMPERATURE: 97.5 F | SYSTOLIC BLOOD PRESSURE: 120 MMHG

## 2024-01-30 DIAGNOSIS — F32.1 MODERATE MAJOR DEPRESSION (HCC): ICD-10-CM

## 2024-01-30 DIAGNOSIS — F41.9 ANXIETY: ICD-10-CM

## 2024-01-30 DIAGNOSIS — J45.20 MILD INTERMITTENT ASTHMA WITHOUT COMPLICATION: ICD-10-CM

## 2024-01-30 DIAGNOSIS — I10 BENIGN ESSENTIAL HYPERTENSION: ICD-10-CM

## 2024-01-30 DIAGNOSIS — Z00.00 WELL ADULT EXAM: Primary | ICD-10-CM

## 2024-01-30 DIAGNOSIS — G47.00 INSOMNIA, UNSPECIFIED TYPE: ICD-10-CM

## 2024-01-30 PROBLEM — M19.90 ACUTE ARTHRITIS: Status: RESOLVED | Noted: 2018-07-19 | Resolved: 2024-01-30

## 2024-01-30 PROCEDURE — 3074F SYST BP LT 130 MM HG: CPT | Performed by: FAMILY MEDICINE

## 2024-01-30 PROCEDURE — 1160F RVW MEDS BY RX/DR IN RCRD: CPT | Performed by: FAMILY MEDICINE

## 2024-01-30 PROCEDURE — 99397 PER PM REEVAL EST PAT 65+ YR: CPT | Performed by: FAMILY MEDICINE

## 2024-01-30 PROCEDURE — 1159F MED LIST DOCD IN RCRD: CPT | Performed by: FAMILY MEDICINE

## 2024-01-30 PROCEDURE — 3078F DIAST BP <80 MM HG: CPT | Performed by: FAMILY MEDICINE

## 2024-01-30 RX ORDER — LEVOCETIRIZINE DIHYDROCHLORIDE 5 MG/1
TABLET, FILM COATED ORAL
Qty: 90 TABLET | Refills: 1 | Status: SHIPPED | OUTPATIENT
Start: 2024-01-30

## 2024-01-30 RX ORDER — ESCITALOPRAM OXALATE 10 MG/1
10 TABLET ORAL DAILY
Qty: 30 TABLET | Refills: 1 | Status: SHIPPED | OUTPATIENT
Start: 2024-01-30

## 2024-01-30 RX ORDER — HYDROXYZINE HYDROCHLORIDE 25 MG/1
25 TABLET, FILM COATED ORAL EVERY 6 HOURS PRN
Qty: 30 TABLET | Refills: 1 | Status: SHIPPED | OUTPATIENT
Start: 2024-01-30

## 2024-01-30 NOTE — TELEPHONE ENCOUNTER
PA for Hydroxyzine HCL (ATARAX) 25 mg tablet     Submitted via  [][employer]   [x]APX-Case ID # [id number]  []Faxed to plan   []Other website   []Phone call Case ID #     Office notes sent, clinical questions answered. Awaiting determination

## 2024-01-30 NOTE — TELEPHONE ENCOUNTER
PA for Hydroxyzine HCL (ATARAX) 25 mg tablet Approved     Date(s) approved 1-1-2024 to 1-          Patient advised by [x] [employer] Message                      [] Phone call       Pharmacy advised by [x]Fax                                     []Phone call    Approval letter scanned into Media No not available at this time

## 2024-01-30 NOTE — PROGRESS NOTES
ADULT ANNUAL PHYSICAL  [facility]    NAME: Casey Machado  AGE: 68 y.o. SEX: female  : 1955     DATE: 2024     Assessment and Plan:     Problem List Items Addressed This Visit          Respiratory    Mild intermittent asthma without complication     Controlled. Has not used her rescue inhaler in a while.             Cardiovascular and Mediastinum    Benign essential hypertension     Controlled on losartan.             Other    Moderate major depression (HCC)     Stable. On lexapro.          Relevant Medications    escitalopram (Lexapro) 10 mg tablet    hydrOXYzine HCL (ATARAX) 25 mg tablet    Anxiety     Not fully controlled. Will increase dose of lexapro from 5 to 10mg. F/u in 1 month         Relevant Medications    escitalopram (Lexapro) 10 mg tablet    hydrOXYzine HCL (ATARAX) 25 mg tablet     Other Visit Diagnoses       Well adult exam    -  Primary    Insomnia, unspecified type        Relevant Medications    hydrOXYzine HCL (ATARAX) 25 mg tablet            Immunizations and preventive care screenings were discussed with patient today. Appropriate education was printed on patient's after visit summary.        Depression Screening and Follow-up Plan: Patient was screened for depression during today's encounter. They screened negative with a PHQ-9 score of 0.        No follow-ups on file.     Chief Complaint:     Chief Complaint   Patient presents with    Physical Exam     Sas/cma      History of Present Illness:     Adult Annual Physical   Patient here for a comprehensive physical exam. The patient reports no problems.    Diet and Physical Activity  Diet/Nutrition: well balanced diet.   Exercise: no formal exercise.      Depression Screening  PHQ-2/9 Depression Screening    Little interest or pleasure in doing things: 0 - not at all  Feeling down, depressed, or hopeless: 0 - not at all  Trouble falling or staying asleep, or sleeping too much: 0 -  not at all  Feeling tired or having little energy: 0 - not at all  Poor appetite or overeatin - not at all  Feeling bad about yourself - or that you are a failure or have let yourself or your family down: 0 - not at all  Trouble concentrating on things, such as reading the newspaper or watching television: 0 - not at all  Moving or speaking so slowly that other people could have noticed. Or the opposite - being so fidgety or restless that you have been moving around a lot more than usual: 0 - not at all  Thoughts that you would be better off dead, or of hurting yourself in some way: 0 - not at all  PHQ-9 Score: 0  PHQ-9 Interpretation: No or Minimal depression       General Health  Sleep: sleeps well.   Hearing: decreased - bilateral. Has tinnitus. Has been trying to reach out to ENT for evaluation.  Vision: no vision problems. 20/40 vision in the left eye- she has seen eye doctor in the past. She will get vision checked again.   Dental: no dental visits for >1 year.       /GYN Health  Follows with gynecology? no   Patient is: postmenopausal     Review of Systems:     Review of Systems   Constitutional: Negative.    HENT: Negative.     Eyes: Negative.    Respiratory: Negative.     Cardiovascular: Negative.    Gastrointestinal: Negative.    Endocrine: Negative.    Genitourinary: Negative.    Musculoskeletal: Negative.    Skin: Negative.    Allergic/Immunologic: Negative.    Neurological: Negative.    Hematological: Negative.    Psychiatric/Behavioral: Negative.        Past Medical History:     Past Medical History:   Diagnosis Date    Acute non-recurrent maxillary sinusitis 1/15/2023    Allergic rhinitis     last assessed 14, resolved 12/22/15    Anxiety     Arthritis     resolved 12/22/15    Asthma     Asthmatic bronchitis without complication 2018    Added automatically from request for surgery [id number]    Bariatric surgery status     Cancer (HCC)     derma fibroid sarcoma protuberance    Depression   "   \"situational\"    Environmental and seasonal allergies     \"smoke,perfume and mold some of my triggers\"    GERD (gastroesophageal reflux disease)     Hiatal hernia     History of cellulitis     Hyperlipidemia     \"a little high\"    Hypertension     Benign essential     Joint stiffness of knee     Low back pain     Lumbar disc disease     Morbid obesity (HCC)     Osteoarthritis     Postgastrectomy malabsorption     Pre-operative laboratory examination     Right knee pain     Shortness of breath     \"occas \"    Use of cane as ambulatory aid     \"for long distances\"      Past Surgical History:     Past Surgical History:   Procedure Laterality Date     SECTION      last assessed 14    MOHS SURGERY Right     upper arm    CO EGD TRANSORAL BIOPSY SINGLE/MULTIPLE N/A 2018    Procedure: ESOPHAGOGASTRODUODENOSCOPY (EGD) with bx;  Surgeon: Matty Munoz MD;  Location: [facility];  Service: Bariatrics    CO LAPS GSTR RSTCV PX W/BYP AMISH-EN-Y LIMB <150 CM N/A 2018    Procedure: LAPAROSCOPIC AMISH-EN-Y GASTRIC BYPASS AND INTRAOPERATIVE EGD;  Surgeon: Matty Munoz MD;  Location: [facility];  Service: Bariatrics      Social History:     Social History     Socioeconomic History    Marital status: /Civil Union     Spouse name: None    Number of children: None    Years of education: None    Highest education level: None   Occupational History    None   Tobacco Use    Smoking status: Never    Smokeless tobacco: Never   Vaping Use    Vaping status: Never Used   Substance and Sexual Activity    Alcohol use: Yes     Comment: rare    Drug use: No    Sexual activity: None   Other Topics Concern    None   Social History Narrative    None     Social Determinants of Health     Financial Resource Strain: Not on file   Food Insecurity: Not on file   Transportation Needs: Not on file   Physical Activity: Not on file   Stress: Not on file   Social Connections: Not on file   Intimate Partner Violence: Not on file " "  Housing Stability: Not on file      Family History:     Family History   Problem Relation Age of Onset    Hypertension Mother     Heart disease Father     Cancer Neg Hx     Diabetes Neg Hx     Thyroid disease Neg Hx       Current Medications:     Current Outpatient Medications   Medication Sig Dispense Refill    acetaminophen (TYLENOL) 500 mg tablet Take 500 mg by mouth every 6 (six) hours as needed for mild pain      albuterol (PROVENTIL HFA,VENTOLIN HFA) 90 mcg/act inhaler Inhale 2 puffs every 6 (six) hours as needed for wheezing or shortness of breath 18 g 0    Cholecalciferol (VITAMIN D) 2000 units CAPS Take 1 capsule by mouth every evening        escitalopram (Lexapro) 10 mg tablet Take 1 tablet (10 mg total) by mouth daily 30 tablet 1    fluticasone (FLONASE) 50 mcg/act nasal spray 1 spray into each nostril daily 11.1 mL 3    hydrOXYzine HCL (ATARAX) 25 mg tablet Take 1 tablet (25 mg total) by mouth every 6 (six) hours as needed for anxiety 30 tablet 1    ipratropium-albuterol (DUO-NEB) 0.5-2.5 mg/3 mL Take 3 mL by nebulization every 6 (six) hours as needed      levocetirizine (XYZAL) 5 MG tablet TAKE ONE TABLET BY MOUTH EVERY DAY DURING SEASONAL ALLERGY SEASON (GENERIC FOR XYZAL) 90 tablet 1    LORazepam (Ativan) 0.5 mg tablet Take 1 tablet (0.5 mg total) by mouth daily as needed for anxiety 30 tablet 0    losartan (COZAAR) 100 MG tablet TAKE ONE TABLET BY MOUTH EVERY DAY 90 tablet 0    Multiple Vitamins-Minerals (WOMENS MULTIVITAMIN PO) Take 1 tablet by mouth daily at bedtime        omeprazole (PriLOSEC) 20 mg delayed release capsule TAKE ONE CAPSULE BY MOUTH EVERY DAY (GENERIC FOR PRILOSEC) 90 capsule 1     No current facility-administered medications for this visit.      Allergies:     No Known Allergies   Physical Exam:     /78   Pulse 77   Temp 97.5 °F (36.4 °C)   Resp 16   Ht 5' 2\" (1.575 m)   Wt 98.9 kg (218 lb)   BMI 39.87 kg/m²     Physical Exam  Vitals and nursing note reviewed. "   Constitutional:       General: She is not in acute distress.     Appearance: She is well-developed.   HENT:      Head: Normocephalic and atraumatic.      Right Ear: Tympanic membrane, ear canal and external ear normal. There is no impacted cerumen.      Left Ear: Tympanic membrane, ear canal and external ear normal. There is no impacted cerumen.      Nose: Nose normal.      Mouth/Throat:      Mouth: Mucous membranes are moist.   Eyes:      Conjunctiva/sclera: Conjunctivae normal.   Cardiovascular:      Rate and Rhythm: Normal rate and regular rhythm.      Heart sounds: No murmur heard.  Pulmonary:      Effort: Pulmonary effort is normal. No respiratory distress.      Breath sounds: Normal breath sounds.   Abdominal:      General: Abdomen is flat. There is no distension.      Palpations: Abdomen is soft. There is no mass.      Tenderness: There is no abdominal tenderness. There is no guarding or rebound.      Hernia: No hernia is present.   Musculoskeletal:         General: Normal range of motion.      Cervical back: Neck supple.   Skin:     General: Skin is warm and dry.   Neurological:      General: No focal deficit present.      Mental Status: She is alert and oriented to person, place, and time.          Joycelyn Srivastava MD  [facility]

## 2024-02-01 ENCOUNTER — OFFICE VISIT (OUTPATIENT)
Dept: PHYSICAL THERAPY | Facility: CLINIC | Age: 69
End: 2024-02-01
Payer: COMMERCIAL

## 2024-02-01 DIAGNOSIS — M54.50 CHRONIC LOW BACK PAIN, UNSPECIFIED BACK PAIN LATERALITY, UNSPECIFIED WHETHER SCIATICA PRESENT: ICD-10-CM

## 2024-02-01 DIAGNOSIS — G89.29 CHRONIC LOW BACK PAIN, UNSPECIFIED BACK PAIN LATERALITY, UNSPECIFIED WHETHER SCIATICA PRESENT: ICD-10-CM

## 2024-02-01 DIAGNOSIS — M17.0 OSTEOARTHRITIS OF BOTH KNEES, UNSPECIFIED OSTEOARTHRITIS TYPE: Primary | ICD-10-CM

## 2024-02-01 PROCEDURE — 97110 THERAPEUTIC EXERCISES: CPT | Performed by: PHYSICAL THERAPIST

## 2024-02-01 NOTE — PROGRESS NOTES
"Daily Note     Today's date: 2024  Patient name: Casey Machado  : 1955  MRN: [id number]  Referring provider: Joycelyn Srivastava MD  Dx:   Encounter Diagnosis     ICD-10-CM    1. Osteoarthritis of both knees, unspecified osteoarthritis type  M17.0       2. Chronic low back pain, unspecified back pain laterality, unspecified whether sciatica present  M54.50     G89.29                Subjective: Patient reports some achiness in her low back at times. Reports that her legs have been doing okay lately.     Objective: See treatment diary below    Assessment: Tolerated treatment well. Patient reported feeling good with treatment today. Cueing needed for split squat leg position to avoid knee irritation.     Plan: Continue per plan of care.      Insurance:  AMA/CMS Eval/ Re-eval POC expires FOTO Auth #/ Referral # Total    Start date  Expiration date Visit limitation?  PT only or  PT+OT? Co-Insurance   AMA 1.16 324  Not needed    BOMN                    Precautions: asthma, HTN, standard  Patient provided verbal consent to treatment plan and recommended interventions.    Manuals 1.16 1.23 1.25 1.29 2.1     visit 1 2 3 4 5                                   Neuro Re-Ed          Low rows   3*10 GTB 3*10 Black TB 3*10 Black TB     bridge    3*10                Ther Ex          HARLEY 2*10 2*10 2*10 2*10 2*10     SLR flexion 3*10 ea. HEP 2*10 ea. 3*10 ea.      Rep. Knee extn with OP 20x HEP        Calf raises  3*10 3*10 3*10 3*10     squats  3*10 3*10 3*10 2*15     Nu step- st 8  8' lvl 2 8' lvl 3 5' lvl 3 8' lvl 3     Step up  2*10, 6\" ea. 2*10, 6\" ea.  3*10, 6\" ea.     Lateral step up  2*10, 4\" ea. 3*10, 4\" ea. 2*15, 4\" ea. 2*15, 6\" ea.     Side stepping  2*3 laps 12' 2*3 laps 12' 2*3 laps 12' 2*3 laps 12' x-light loop     Split squat     2*10 ea.                                                       Ther Activity          Pt ed FB                        "

## 2024-02-06 ENCOUNTER — OFFICE VISIT (OUTPATIENT)
Dept: PHYSICAL THERAPY | Facility: CLINIC | Age: 69
End: 2024-02-06
Payer: COMMERCIAL

## 2024-02-06 DIAGNOSIS — M54.50 CHRONIC LOW BACK PAIN, UNSPECIFIED BACK PAIN LATERALITY, UNSPECIFIED WHETHER SCIATICA PRESENT: ICD-10-CM

## 2024-02-06 DIAGNOSIS — G89.29 CHRONIC LOW BACK PAIN, UNSPECIFIED BACK PAIN LATERALITY, UNSPECIFIED WHETHER SCIATICA PRESENT: ICD-10-CM

## 2024-02-06 DIAGNOSIS — M17.0 OSTEOARTHRITIS OF BOTH KNEES, UNSPECIFIED OSTEOARTHRITIS TYPE: Primary | ICD-10-CM

## 2024-02-06 PROCEDURE — 97110 THERAPEUTIC EXERCISES: CPT | Performed by: PHYSICAL THERAPIST

## 2024-02-06 NOTE — PROGRESS NOTES
"Daily Note     Today's date: 2024  Patient name: Casey Machado  : 1955  MRN: [id number]  Referring provider: Joycelyn Srivastava MD  Dx:   Encounter Diagnosis     ICD-10-CM    1. Osteoarthritis of both knees, unspecified osteoarthritis type  M17.0       2. Chronic low back pain, unspecified back pain laterality, unspecified whether sciatica present  M54.50     G89.29       Start Time: 1545  Stop Time: 1628  Total time in clinic (min): 43 minutes  Subjective: Patient reports better adherence to HEP. Reports not being able to straighten RLE at this time actively or passively.     Objective: See treatment diary below    Assessment: Tolerated treatment well. Patient reported feeling okay with treatment today. No reporting of knee irritation reported.      Plan: Continue per plan of care.      Insurance:  AMA/CMS Eval/ Re-eval POC expires FOTO Auth #/ Referral # Total    Start date  Expiration date Visit limitation?  PT only or  PT+OT? Co-Insurance   AMA 1.16.24 3.12.24  Not needed    BOMN                    Precautions: asthma, HTN, standard  Patient provided verbal consent to treatment plan and recommended interventions.    HEP: HARLEY, rep knee extn with OP    Manuals 1.16 1.23 1.25 1.29 2.1 2.6    visit 1 2 3 4 5 6                                  Neuro Re-Ed          Low rows   3*10 GTB 3*10 Black TB 3*10 Black TB 3*10 Black TB    bridge    3*10  3*10    Clam shell      3*10 ea.              Ther Ex          HARLEY 2*10 2*10 2*10 2*10 2*10     SLR flexion 3*10 ea. HEP 2*10 ea. 3*10 ea.  3*12 ea.    Calf raises  3*10 3*10 3*10 3*10 3*10    squats  3*10 3*10 3*10 2*15 2*15    Nu step- st 8  8' lvl 2 8' lvl 3 5' lvl 3 8' lvl 3 7' lvl 3    Step up  2*10, 6\" ea. 2*10, 6\" ea.  3*10, 6\" ea. 3*10, 6\" ea.    Lateral step up  2*10, 4\" ea. 3*10, 4\" ea. 2*15, 4\" ea. 2*15, 6\" ea. 3*12, 6\" ea.    Side stepping  2*3 laps 12' 2*3 laps 12' 2*3 laps 12' 2*3 laps 12' x-light loop 2*3 laps 12' x-light loop    Split squat     2*10 " ea. 2*10 ea.                                                      Ther Activity          Pt ed FB

## 2024-02-08 ENCOUNTER — OFFICE VISIT (OUTPATIENT)
Dept: PHYSICAL THERAPY | Facility: CLINIC | Age: 69
End: 2024-02-08
Payer: COMMERCIAL

## 2024-02-08 DIAGNOSIS — G89.29 CHRONIC LOW BACK PAIN, UNSPECIFIED BACK PAIN LATERALITY, UNSPECIFIED WHETHER SCIATICA PRESENT: ICD-10-CM

## 2024-02-08 DIAGNOSIS — M17.0 OSTEOARTHRITIS OF BOTH KNEES, UNSPECIFIED OSTEOARTHRITIS TYPE: Primary | ICD-10-CM

## 2024-02-08 DIAGNOSIS — M54.50 CHRONIC LOW BACK PAIN, UNSPECIFIED BACK PAIN LATERALITY, UNSPECIFIED WHETHER SCIATICA PRESENT: ICD-10-CM

## 2024-02-08 PROCEDURE — 97112 NEUROMUSCULAR REEDUCATION: CPT | Performed by: PHYSICAL THERAPIST

## 2024-02-08 PROCEDURE — 97110 THERAPEUTIC EXERCISES: CPT | Performed by: PHYSICAL THERAPIST

## 2024-02-08 NOTE — PROGRESS NOTES
"Daily Note     Today's date: 2024  Patient name: Casey Machado  : 1955  MRN: [id number]  Referring provider: Joycelyn Srivastava MD  Dx:   Encounter Diagnosis     ICD-10-CM    1. Osteoarthritis of both knees, unspecified osteoarthritis type  M17.0       2. Chronic low back pain, unspecified back pain laterality, unspecified whether sciatica present  M54.50     G89.29                Subjective: Patient reports doing okay entering treatment today.     Objective: See treatment diary below    Assessment: Tolerated treatment well. Patient reported slight leg fatigue with treatment.     Plan: Continue per plan of care.      Insurance:  AMA/CMS Eval/ Re-eval POC expires FOTO Auth #/ Referral # Total    Start date  Expiration date Visit limitation?  PT only or  PT+OT? Co-Insurance   AMA 24 3  Not needed    BOMN                    Precautions: asthma, HTN, standard  Patient provided verbal consent to treatment plan and recommended interventions.    HEP: HARLEY, rep knee extn with OP    Manuals 1.16 1.23 1.25 1.29 2.1 2.6 2.8   visit 1 2 3 4 5 6 7                                 Neuro Re-Ed          Low rows   3*10 GTB 3*10 Black TB 3*10 Black TB 3*10 Black TB 3*10 Black TB   bridge    3*10  3*10 3*10   Clam shell      3*10 ea.              Ther Ex          HARLEY 2*10 2*10 2*10 2*10 2*10  2*10 throughout   SLR flexion 3*10 ea. HEP 2*10 ea. 3*10 ea.  3*12 ea. 3*12 ea.   Calf raises  3*10 3*10 3*10 3*10 3*10 3*10   squats  3*10 3*10 3*10 2*15 2*15    Nu step- st 8  8' lvl 2 8' lvl 3 5' lvl 3 8' lvl 3 7' lvl 3 7' lvl 3   Step up  2*10, 6\" ea. 2*10, 6\" ea.  3*10, 6\" ea. 3*10, 6\" ea. 3*10, 6\" ea.   Lateral step up  2*10, 4\" ea. 3*10, 4\" ea. 2*15, 4\" ea. 2*15, 6\" ea. 3*12, 6\" ea. 3*12, 6\" ea.   Side stepping  2*3 laps 12' 2*3 laps 12' 2*3 laps 12' 2*3 laps 12' x-light loop 2*3 laps 12' x-light loop    Split squat     2*10 ea. 2*10 ea. 2*10 ea.                                                     Ther Activity        "   Pt ed FB

## 2024-02-15 ENCOUNTER — OFFICE VISIT (OUTPATIENT)
Dept: PHYSICAL THERAPY | Facility: CLINIC | Age: 69
End: 2024-02-15
Payer: COMMERCIAL

## 2024-02-15 DIAGNOSIS — M54.50 CHRONIC LOW BACK PAIN, UNSPECIFIED BACK PAIN LATERALITY, UNSPECIFIED WHETHER SCIATICA PRESENT: ICD-10-CM

## 2024-02-15 DIAGNOSIS — M17.0 OSTEOARTHRITIS OF BOTH KNEES, UNSPECIFIED OSTEOARTHRITIS TYPE: Primary | ICD-10-CM

## 2024-02-15 DIAGNOSIS — G89.29 CHRONIC LOW BACK PAIN, UNSPECIFIED BACK PAIN LATERALITY, UNSPECIFIED WHETHER SCIATICA PRESENT: ICD-10-CM

## 2024-02-15 PROCEDURE — 97112 NEUROMUSCULAR REEDUCATION: CPT | Performed by: PHYSICAL THERAPIST

## 2024-02-15 PROCEDURE — 97110 THERAPEUTIC EXERCISES: CPT | Performed by: PHYSICAL THERAPIST
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
## 2024-02-20 ENCOUNTER — APPOINTMENT (OUTPATIENT)
Dept: PHYSICAL THERAPY | Facility: CLINIC | Age: 69
End: 2024-02-20
Payer: COMMERCIAL

## 2024-02-22 ENCOUNTER — APPOINTMENT (OUTPATIENT)
Dept: PHYSICAL THERAPY | Facility: CLINIC | Age: 69
End: 2024-02-22
Payer: COMMERCIAL

## 2024-02-22 DIAGNOSIS — F41.9 ANXIETY: ICD-10-CM

## 2024-02-23 RX ORDER — ESCITALOPRAM OXALATE 10 MG/1
10 TABLET ORAL DAILY
Qty: 90 TABLET | Refills: 0 | Status: SHIPPED | OUTPATIENT
Start: 2024-02-23

## 2024-03-03 DIAGNOSIS — E66.01 MORBID (SEVERE) OBESITY DUE TO EXCESS CALORIES (HCC): ICD-10-CM

## 2024-03-04 RX ORDER — OMEPRAZOLE 20 MG/1
CAPSULE, DELAYED RELEASE ORAL
Qty: 90 CAPSULE | Refills: 1 | Status: SHIPPED | OUTPATIENT
Start: 2024-03-04

## 2024-03-18 ENCOUNTER — OFFICE VISIT (OUTPATIENT)
Dept: FAMILY MEDICINE CLINIC | Facility: CLINIC | Age: 69
End: 2024-03-18
Payer: COMMERCIAL

## 2024-03-18 VITALS
OXYGEN SATURATION: 99 % | HEART RATE: 114 BPM | RESPIRATION RATE: 16 BRPM | HEIGHT: 62 IN | TEMPERATURE: 97.5 F | DIASTOLIC BLOOD PRESSURE: 76 MMHG | WEIGHT: 215 LBS | SYSTOLIC BLOOD PRESSURE: 134 MMHG | BODY MASS INDEX: 39.56 KG/M2

## 2024-03-18 DIAGNOSIS — Z12.31 ENCOUNTER FOR SCREENING MAMMOGRAM FOR BREAST CANCER: ICD-10-CM

## 2024-03-18 DIAGNOSIS — J01.90 ACUTE SINUSITIS, RECURRENCE NOT SPECIFIED, UNSPECIFIED LOCATION: Primary | ICD-10-CM

## 2024-03-18 DIAGNOSIS — Z12.11 SCREENING FOR COLON CANCER: ICD-10-CM

## 2024-03-18 DIAGNOSIS — Z78.0 POSTMENOPAUSAL: ICD-10-CM

## 2024-03-18 PROCEDURE — G2211 COMPLEX E/M VISIT ADD ON: HCPCS | Performed by: FAMILY MEDICINE

## 2024-03-18 PROCEDURE — 99213 OFFICE O/P EST LOW 20 MIN: CPT | Performed by: FAMILY MEDICINE

## 2024-03-18 RX ORDER — FLUTICASONE PROPIONATE 50 MCG
1 SPRAY, SUSPENSION (ML) NASAL DAILY
Qty: 11.1 ML | Refills: 3 | Status: SHIPPED | OUTPATIENT
Start: 2024-03-18

## 2024-03-18 RX ORDER — AZITHROMYCIN 250 MG/1
TABLET, FILM COATED ORAL DAILY
Qty: 6 TABLET | Refills: 0 | Status: SHIPPED | OUTPATIENT
Start: 2024-03-18 | End: 2024-03-23

## 2024-03-18 NOTE — PROGRESS NOTES
Name: Casey Machado      : 1955      MRN: [id number]  Encounter Provider: Joycelyn Srivastava MD  Encounter Date: 3/18/2024   Encounter department: [facility]    Assessment & Plan     1. Acute sinusitis, recurrence not specified, unspecified location  -     azithromycin (Zithromax) 250 mg tablet; Take 2 tablets (500 mg total) by mouth daily for 1 day, THEN 1 tablet (250 mg total) daily for 4 days.  -     fluticasone (FLONASE) 50 mcg/act nasal spray; 1 spray into each nostril daily    2. Encounter for screening mammogram for breast cancer  -     Mammo screening bilateral w 3d & cad; Future; Expected date: 2024    3. Postmenopausal  -     DXA bone density spine hip and pelvis; Future; Expected date: 2024    4. Screening for colon cancer  -     Cologuard           Subjective      Sinusitis  This is a new problem. Episode onset: 2 weeks. The problem has been gradually worsening since onset. There has been no fever. Associated symptoms include congestion, coughing (productive), headaches, sinus pressure and a sore throat. Pertinent negatives include no chills, diaphoresis, ear pain, hoarse voice, neck pain, shortness of breath, sneezing or swollen glands. Past treatments include oral decongestants. The treatment provided no relief.       Review of Systems   Constitutional:  Negative for chills and diaphoresis.   HENT:  Positive for congestion, sinus pressure and sore throat. Negative for ear pain, hoarse voice and sneezing.    Respiratory:  Positive for cough (productive). Negative for shortness of breath.    Musculoskeletal:  Negative for neck pain.   Neurological:  Positive for headaches.       Current Outpatient Medications on File Prior to Visit   Medication Sig    acetaminophen (TYLENOL) 500 mg tablet Take 500 mg by mouth every 6 (six) hours as needed for mild pain    albuterol (PROVENTIL HFA,VENTOLIN HFA) 90 mcg/act inhaler Inhale 2 puffs every 6 (six) hours as needed for wheezing or  "shortness of breath    Cholecalciferol (VITAMIN D) 2000 units CAPS Take 1 capsule by mouth every evening      escitalopram (LEXAPRO) 10 mg tablet TAKE 1 TABLET BY MOUTH EVERY DAY    hydrOXYzine HCL (ATARAX) 25 mg tablet Take 1 tablet (25 mg total) by mouth every 6 (six) hours as needed for anxiety    ipratropium-albuterol (DUO-NEB) 0.5-2.5 mg/3 mL Take 3 mL by nebulization every 6 (six) hours as needed    levocetirizine (XYZAL) 5 MG tablet TAKE ONE TABLET BY MOUTH EVERY DAY DURING SEASONAL ALLERGY SEASON (GENERIC FOR XYZAL)    LORazepam (Ativan) 0.5 mg tablet Take 1 tablet (0.5 mg total) by mouth daily as needed for anxiety    losartan (COZAAR) 100 MG tablet TAKE ONE TABLET BY MOUTH EVERY DAY    Multiple Vitamins-Minerals (WOMENS MULTIVITAMIN PO) Take 1 tablet by mouth daily at bedtime      omeprazole (PriLOSEC) 20 mg delayed release capsule TAKE ONE CAPSULE BY MOUTH EVERY DAY (GENERIC FOR PRILOSEC)    [DISCONTINUED] fluticasone (FLONASE) 50 mcg/act nasal spray 1 spray into each nostril daily       Objective     /76   Pulse (!) 114   Temp 97.5 °F (36.4 °C)   Resp 16   Ht 5' 2\" (1.575 m)   Wt 97.5 kg (215 lb)   SpO2 99%   BMI 39.32 kg/m²     Physical Exam  Constitutional:       General: She is not in acute distress.     Appearance: She is well-developed. She is not diaphoretic.   HENT:      Head: Normocephalic and atraumatic.      Right Ear: Tympanic membrane, ear canal and external ear normal. There is no impacted cerumen.      Left Ear: Tympanic membrane, ear canal and external ear normal. There is no impacted cerumen.      Nose: Nose normal. No congestion or rhinorrhea.      Mouth/Throat:      Mouth: Mucous membranes are moist.      Pharynx: Oropharynx is clear. No oropharyngeal exudate or posterior oropharyngeal erythema.   Eyes:      General: No scleral icterus.        Right eye: No discharge.         Left eye: No discharge.      Conjunctiva/sclera: Conjunctivae normal.   Cardiovascular:      Rate " and Rhythm: Normal rate and regular rhythm.      Heart sounds: Normal heart sounds. No murmur heard.     No friction rub. No gallop.   Pulmonary:      Effort: Pulmonary effort is normal. No respiratory distress.      Breath sounds: Normal breath sounds. No wheezing or rales.   Chest:      Chest wall: No tenderness.   Musculoskeletal:         General: No deformity. Normal range of motion.      Cervical back: Normal range of motion and neck supple.   Skin:     General: Skin is warm and dry.   Neurological:      Mental Status: She is alert and oriented to person, place, and time.   Psychiatric:         Behavior: Behavior normal.         Thought Content: Thought content normal.         Judgment: Judgment normal.       Joycelyn Srivastava MD

## 2024-05-07 DIAGNOSIS — I10 ESSENTIAL HYPERTENSION: ICD-10-CM

## 2024-05-07 DIAGNOSIS — F41.9 ANXIETY: ICD-10-CM

## 2024-05-08 RX ORDER — ESCITALOPRAM OXALATE 5 MG/1
5 TABLET ORAL DAILY
Qty: 30 TABLET | Refills: 5 | Status: SHIPPED | OUTPATIENT
Start: 2024-05-08

## 2024-05-08 RX ORDER — LOSARTAN POTASSIUM 100 MG/1
TABLET ORAL
Qty: 90 TABLET | Refills: 1 | Status: SHIPPED | OUTPATIENT
Start: 2024-05-08

## 2024-05-23 DIAGNOSIS — F41.9 ANXIETY: ICD-10-CM

## 2024-05-23 RX ORDER — ESCITALOPRAM OXALATE 10 MG/1
10 TABLET ORAL DAILY
Qty: 90 TABLET | Refills: 1 | Status: SHIPPED | OUTPATIENT
Start: 2024-05-23

## 2024-07-05 ENCOUNTER — TELEPHONE (OUTPATIENT)
Age: 69
End: 2024-07-05

## 2024-07-05 DIAGNOSIS — H91.93 DECREASED HEARING OF BOTH EARS: Primary | ICD-10-CM

## 2024-07-05 NOTE — TELEPHONE ENCOUNTER
The patient called because she needs a referral for Erendira YUN. The patient has an appointment for 08/26/2024

## 2024-07-12 ENCOUNTER — APPOINTMENT (OUTPATIENT)
Dept: RADIOLOGY | Facility: CLINIC | Age: 69
End: 2024-07-12
Payer: COMMERCIAL

## 2024-07-12 ENCOUNTER — OFFICE VISIT (OUTPATIENT)
Dept: OBGYN CLINIC | Facility: CLINIC | Age: 69
End: 2024-07-12
Payer: COMMERCIAL

## 2024-07-12 VITALS
WEIGHT: 222 LBS | HEIGHT: 62 IN | HEART RATE: 76 BPM | DIASTOLIC BLOOD PRESSURE: 78 MMHG | BODY MASS INDEX: 40.85 KG/M2 | SYSTOLIC BLOOD PRESSURE: 113 MMHG

## 2024-07-12 DIAGNOSIS — M25.561 CHRONIC PAIN OF BOTH KNEES: ICD-10-CM

## 2024-07-12 DIAGNOSIS — M17.0 OSTEOARTHRITIS OF BOTH KNEES, UNSPECIFIED OSTEOARTHRITIS TYPE: ICD-10-CM

## 2024-07-12 DIAGNOSIS — M17.0 PRIMARY OSTEOARTHRITIS OF BOTH KNEES: Primary | ICD-10-CM

## 2024-07-12 DIAGNOSIS — M25.562 CHRONIC PAIN OF BOTH KNEES: ICD-10-CM

## 2024-07-12 DIAGNOSIS — E66.01 CLASS 3 SEVERE OBESITY DUE TO EXCESS CALORIES WITHOUT SERIOUS COMORBIDITY WITH BODY MASS INDEX (BMI) OF 40.0 TO 44.9 IN ADULT (HCC): ICD-10-CM

## 2024-07-12 DIAGNOSIS — G89.29 CHRONIC PAIN OF BOTH KNEES: ICD-10-CM

## 2024-07-12 PROCEDURE — 73562 X-RAY EXAM OF KNEE 3: CPT

## 2024-07-12 PROCEDURE — 99204 OFFICE O/P NEW MOD 45 MIN: CPT | Performed by: ORTHOPAEDIC SURGERY

## 2024-07-12 PROCEDURE — 20610 DRAIN/INJ JOINT/BURSA W/O US: CPT | Performed by: ORTHOPAEDIC SURGERY

## 2024-07-12 RX ORDER — BUPIVACAINE HYDROCHLORIDE 5 MG/ML
2 INJECTION, SOLUTION EPIDURAL; INTRACAUDAL
Status: COMPLETED | OUTPATIENT
Start: 2024-07-12 | End: 2024-07-12

## 2024-07-12 RX ORDER — TRIAMCINOLONE ACETONIDE 40 MG/ML
80 INJECTION, SUSPENSION INTRA-ARTICULAR; INTRAMUSCULAR
Status: COMPLETED | OUTPATIENT
Start: 2024-07-12 | End: 2024-07-12

## 2024-07-12 RX ADMIN — BUPIVACAINE HYDROCHLORIDE 2 ML: 5 INJECTION, SOLUTION EPIDURAL; INTRACAUDAL at 09:00

## 2024-07-12 RX ADMIN — TRIAMCINOLONE ACETONIDE 80 MG: 40 INJECTION, SUSPENSION INTRA-ARTICULAR; INTRAMUSCULAR at 09:00

## 2024-07-12 NOTE — PROGRESS NOTES
Assessment/Plan:  1. Primary osteoarthritis of both knees  Ambulatory Referral to Orthopedic Surgery    XR knee 3 vw right non injury    XR knee 3 vw left non injury    Large joint arthrocentesis: bilateral knee      2. Chronic pain of both knees        3. Class 3 severe obesity due to excess calories without serious comorbidity with body mass index (BMI) of 40.0 to 44.9 in adult (HCC)          Scribe Attestation      I,:  Fabby Benavides MD am acting as a scribe while in the presence of the attending physician.:       I,:  Roman Rudd DO personally performed the services described in this documentation    as scribed in my presence.:           Caroline is a pleasant 69 year old woman who presents today for initial evaluation of bilateral knee pain. Her history, exam, and imaging is most consistent with severe, end stage primary knee osteoarthritis of both knees. She has previously attempted activity modification, ambulatory aid, medications, and PT without relief. As such, she was offered, accepted, and received bilateral knee intra articular injections in clinic today as detailed below. Post injection instructions provided. She understands that she will need to loose some weight to get her BMI under 40, currently 40.60, in order to be a candidate for total knee replacement. She will follow up PRN depending on efficacy of injections and potential desired timeline of surgery to assess if she is a candidate by that time.    Large joint arthrocentesis: bilateral knee  Universal Protocol:  Consent: Verbal consent obtained.  Risks and benefits: risks, benefits and alternatives were discussed  Consent given by: patient  Patient understanding: patient states understanding of the procedure being performed  Patient identity confirmed: verbally with patient  Supporting Documentation  Indications: pain   Procedure Details  Location: knee - bilateral knee  Preparation: Patient was prepped and draped in the usual sterile  fashion  Needle size: 20 G  Ultrasound guidance: no  Approach: anterolateral    Medications (Right): 2 mL bupivacaine (PF) 0.5 %; 80 mg triamcinolone acetonide 40 mg/mLMedications (Left): 2 mL bupivacaine (PF) 0.5 %; 80 mg triamcinolone acetonide 40 mg/mL   Patient tolerance: patient tolerated the procedure well with no immediate complications  Dressing:  Sterile dressing applied          Subjective: bilateral knee pain    Patient ID: Casey Machado is a 69 y.o. female who presents today for initial evaluation of bilateral knee pain. She reports severe, activity related medial based knee pain bilaterally that is worse with weight bearing, made better with rest. Her pain is worse at night and wakes her from sleep. She has previously attempted activity modification, walking aid in the form of a hiking stick, medications, and PT without relief. She has never received CSI. Denies any mechanical symptoms.    Review of Systems   Constitutional:  Negative for chills and fever.   HENT:  Negative for ear pain and sore throat.    Eyes:  Negative for pain and visual disturbance.   Respiratory:  Negative for cough and shortness of breath.    Cardiovascular:  Negative for chest pain and palpitations.   Gastrointestinal:  Negative for abdominal pain and vomiting.   Genitourinary:  Negative for dysuria and hematuria.   Musculoskeletal:  Positive for arthralgias. Negative for back pain.   Skin:  Negative for color change and rash.   Neurological:  Negative for seizures and syncope.   All other systems reviewed and are negative.        Past Medical History:   Diagnosis Date    Acute non-recurrent maxillary sinusitis 1/15/2023    Allergic rhinitis     last assessed 1/20/14, resolved 12/22/15    Anxiety     Arthritis     resolved 12/22/15    Asthma     Asthmatic bronchitis without complication 7/19/2018    Added automatically from request for surgery [id number]    Bariatric surgery status     Cancer (HCC)     derma fibroid sarcoma  "protuberance    Depression     \"situational\"    Environmental and seasonal allergies     \"smoke,perfume and mold some of my triggers\"    GERD (gastroesophageal reflux disease)     Hiatal hernia     History of cellulitis     Hyperlipidemia     \"a little high\"    Hypertension     Benign essential     Joint stiffness of knee     Low back pain     Lumbar disc disease     Morbid obesity (HCC)     Osteoarthritis     Postgastrectomy malabsorption     Pre-operative laboratory examination     Right knee pain     Shortness of breath     \"occas \"    Use of cane as ambulatory aid     \"for long distances\"       Past Surgical History:   Procedure Laterality Date     SECTION      last assessed 14    MOHS SURGERY Right     upper arm    MS EGD TRANSORAL BIOPSY SINGLE/MULTIPLE N/A 2018    Procedure: ESOPHAGOGASTRODUODENOSCOPY (EGD) with bx;  Surgeon: Matty Munoz MD;  Location: [facility];  Service: Bariatrics    MS LAPS GSTR RSTCV PX W/BYP AMISH-EN-Y LIMB <150 CM N/A 2018    Procedure: LAPAROSCOPIC AMISH-EN-Y GASTRIC BYPASS AND INTRAOPERATIVE EGD;  Surgeon: Matty Munoz MD;  Location: [facility];  Service: Bariatrics       Family History   Problem Relation Age of Onset    Hypertension Mother     Heart disease Father     Cancer Neg Hx     Diabetes Neg Hx     Thyroid disease Neg Hx        Social History     Occupational History    Not on file   Tobacco Use    Smoking status: Never    Smokeless tobacco: Never   Vaping Use    Vaping status: Never Used   Substance and Sexual Activity    Alcohol use: Yes     Comment: rare    Drug use: No    Sexual activity: Not on file         Current Outpatient Medications:     acetaminophen (TYLENOL) 500 mg tablet, Take 500 mg by mouth every 6 (six) hours as needed for mild pain, Disp: , Rfl:     albuterol (PROVENTIL HFA,VENTOLIN HFA) 90 mcg/act inhaler, Inhale 2 puffs every 6 (six) hours as needed for wheezing or shortness of breath, Disp: 18 g, Rfl: 0    Cholecalciferol " (VITAMIN D) 2000 units CAPS, Take 1 capsule by mouth every evening  , Disp: , Rfl:     escitalopram (LEXAPRO) 10 mg tablet, TAKE 1 TABLET BY MOUTH EVERY DAY, Disp: 90 tablet, Rfl: 1    escitalopram (LEXAPRO) 5 mg tablet, TAKE ONE TABLET BY MOUTH EVERY DAY, Disp: 30 tablet, Rfl: 5    fluticasone (FLONASE) 50 mcg/act nasal spray, 1 spray into each nostril daily, Disp: 11.1 mL, Rfl: 3    hydrOXYzine HCL (ATARAX) 25 mg tablet, Take 1 tablet (25 mg total) by mouth every 6 (six) hours as needed for anxiety, Disp: 30 tablet, Rfl: 1    levocetirizine (XYZAL) 5 MG tablet, TAKE ONE TABLET BY MOUTH EVERY DAY DURING SEASONAL ALLERGY SEASON (GENERIC FOR XYZAL), Disp: 90 tablet, Rfl: 1    LORazepam (Ativan) 0.5 mg tablet, Take 1 tablet (0.5 mg total) by mouth daily as needed for anxiety, Disp: 30 tablet, Rfl: 0    losartan (COZAAR) 100 MG tablet, TAKE ONE TABLET BY MOUTH EVERY DAY, Disp: 90 tablet, Rfl: 1    Multiple Vitamins-Minerals (WOMENS MULTIVITAMIN PO), Take 1 tablet by mouth daily at bedtime  , Disp: , Rfl:     omeprazole (PriLOSEC) 20 mg delayed release capsule, TAKE ONE CAPSULE BY MOUTH EVERY DAY (GENERIC FOR PRILOSEC), Disp: 90 capsule, Rfl: 1    ipratropium-albuterol (DUO-NEB) 0.5-2.5 mg/3 mL, Take 3 mL by nebulization every 6 (six) hours as needed (Patient not taking: Reported on 7/12/2024), Disp: , Rfl:     No Known Allergies    Objective:  Vitals:    07/12/24 0902   BP: 113/78   Pulse: 76       Body mass index is 40.6 kg/m².    Right Knee Exam     Tenderness   The patient is experiencing tenderness in the lateral joint line and medial joint line.    Range of Motion   Extension:  5   Flexion:  90     Tests   Varus: negative Valgus: negative  Lachman:  Anterior - negative    Posterior - negative  Drawer:  Anterior - negative    Posterior - negative    Other   Erythema: absent  Scars: absent  Sensation: normal  Pulse: present  Swelling: none  Effusion: no effusion present    Comments:  Neutral alignment  + patellar  crepitus      Left Knee Exam     Tenderness   The patient is experiencing tenderness in the medial joint line.    Range of Motion   Extension:  0   Flexion:  90     Tests   Varus: negative Valgus: negative  Lachman:  Anterior - negative    Posterior - negative  Drawer:  Anterior - negative     Posterior - negative    Other   Erythema: absent  Scars: absent  Sensation: normal  Pulse: present  Swelling: none  Effusion: no effusion present    Comments:  Varus alignment, passively correctable  + patellar crepitus          Observations   Left Knee   Negative for effusion.     Right Knee   Negative for effusion.       Physical Exam  Vitals and nursing note reviewed.   Constitutional:       General: She is not in acute distress.     Appearance: Normal appearance.   HENT:      Head: Atraumatic.   Eyes:      Extraocular Movements: Extraocular movements intact.      Conjunctiva/sclera: Conjunctivae normal.   Cardiovascular:      Rate and Rhythm: Normal rate.      Pulses: Normal pulses.   Pulmonary:      Effort: Pulmonary effort is normal.   Musculoskeletal:      Right knee: No effusion.      Left knee: No effusion.      Comments: See Ortho Exam   Skin:     General: Skin is warm and dry.      Capillary Refill: Capillary refill takes less than 2 seconds.   Neurological:      Mental Status: She is alert and oriented to person, place, and time.   Psychiatric:         Mood and Affect: Mood normal.         I have personally reviewed pertinent films in PACS.    XR bilateral knees demonstrates severe, end stage osteoarthritis of both knees as evidenced by joint space narrowing, subchondral sclerosis, subchondral cyst formation, and osteophyte formation. The right knee has more advanced degenerative changes than the left on imaging. No fracture or dislocation.    This document was created using speech voice recognition software.   Grammatical errors, random word insertions, pronoun errors, and incomplete sentences are an occasional  consequence of this system due to software limitations, ambient noise, and hardware issues.   Any formal questions or concerns about content, text, or information contained within the body of this dictation should be directly addressed to the provider for clarification.

## 2024-07-18 DIAGNOSIS — E66.01 MORBID (SEVERE) OBESITY DUE TO EXCESS CALORIES (HCC): ICD-10-CM

## 2024-07-18 RX ORDER — OMEPRAZOLE 20 MG/1
CAPSULE, DELAYED RELEASE ORAL
Qty: 100 CAPSULE | Refills: 1 | Status: SHIPPED | OUTPATIENT
Start: 2024-07-18

## 2024-07-28 DIAGNOSIS — T78.40XD ALLERGY, SUBSEQUENT ENCOUNTER: ICD-10-CM

## 2024-07-29 RX ORDER — LEVOCETIRIZINE DIHYDROCHLORIDE 5 MG/1
TABLET, FILM COATED ORAL
Qty: 90 TABLET | Refills: 1 | Status: SHIPPED | OUTPATIENT
Start: 2024-07-29

## 2024-08-26 ENCOUNTER — OFFICE VISIT (OUTPATIENT)
Dept: AUDIOLOGY | Facility: CLINIC | Age: 69
End: 2024-08-26
Payer: COMMERCIAL

## 2024-08-26 DIAGNOSIS — H90.3 SENSORINEURAL HEARING LOSS, BILATERAL: Primary | ICD-10-CM

## 2024-08-26 DIAGNOSIS — H93.13 TINNITUS, BILATERAL: ICD-10-CM

## 2024-08-26 DIAGNOSIS — H91.93 DECREASED HEARING OF BOTH EARS: ICD-10-CM

## 2024-08-26 PROCEDURE — 92557 COMPREHENSIVE HEARING TEST: CPT | Performed by: AUDIOLOGIST

## 2024-08-27 NOTE — PROGRESS NOTES
Diagnostic Hearing Evaluation    Name:  Casey Machado  :  1955  Age:  69 y.o.   MRN:  [id number]  Date of Evaluation: 2024    HISTORY:     Reason for visit:  Tinnitus, hearing loss     Casey Machado is being seen today at the request of Dr. Srivastava for an initial  evaluation of hearing. The patient reports tinnitus bilaterally, Left possibly greater than right ear.  The patient  denies otalgia, dizziness, and notes a positive history of noise exposure and family history of hearing loss (maternal side, mother and grandmother).   She describes a chronic history of sinus related issues including ETD and drainage.  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      EVALUATION:    Otoscopic Evaluation:   Right Ear: Unremarkable, canal clear   Left Ear: Unremarkable, canal clear    Tympanometry:   DNT due to self pay cost      Speech Audiometry:  Speech Reception (SRT)    Right Ear: 35 dB HL    Left Ear: 40 dB HL    Word Recognition Scores (WRS):  Right Ear: excellent (96 % correct)     Left Ear: excellent (100 % correct)    Stimuli: [id number]    Pure Tone Audiometry:  Conventional pure tone audiometry from 250 - 8000 Hz  was obtained with good reliability and revealed the following:     Right Ear: Moderate SHL    Left Ear:  Moderate SHL     *see attached audiogram    IMPRESSIONS:   Moderate, flat, sensory hearing impairment     RECOMMENDATIONS:  As her insurance company May have HA coverage / discount with a 3rd party, she was encouraged to contact them directly for details.   We are OON for services AND ENT is OON for services at [facility].     She may contact us for a HA evaluation pending the outcome of the above.       PATIENT EDUCATION:   The results of today's results and recommendations were reviewed with the patient and her hearing thresholds were explained at length. Treatment options, including amplification and communication strategies, were discussed as appropriate.  A  brochure was provided regarding Learning about Tinnitus.  The patient voiced understanding of her test results. Questions were addressed and the patient was encouraged to contact our department should concerns arise.    Tiara Campos, CCC-A  Clinical Audiologist  [id number]  [phone]  [facility] AUDIOLOGY  [address] [address]

## 2024-10-08 DIAGNOSIS — F41.9 ANXIETY: ICD-10-CM

## 2024-10-08 DIAGNOSIS — G47.00 INSOMNIA, UNSPECIFIED TYPE: ICD-10-CM

## 2024-10-08 RX ORDER — HYDROXYZINE HYDROCHLORIDE 25 MG/1
TABLET, FILM COATED ORAL
Qty: 30 TABLET | Refills: 1 | Status: SHIPPED | OUTPATIENT
Start: 2024-10-08

## 2024-10-11 ENCOUNTER — TELEPHONE (OUTPATIENT)
Age: 69
End: 2024-10-11

## 2024-10-11 DIAGNOSIS — J34.9 SINUS PROBLEM: Primary | ICD-10-CM

## 2024-10-14 NOTE — TELEPHONE ENCOUNTER
Left message on patients phone letting her know there is a referral left for her in her chart from Dr Srivastava.  Tracy Strickland MA

## 2024-10-16 ENCOUNTER — OFFICE VISIT (OUTPATIENT)
Dept: FAMILY MEDICINE CLINIC | Facility: CLINIC | Age: 69
End: 2024-10-16
Payer: COMMERCIAL

## 2024-10-16 VITALS
HEIGHT: 62 IN | DIASTOLIC BLOOD PRESSURE: 88 MMHG | HEART RATE: 104 BPM | BODY MASS INDEX: 40.85 KG/M2 | TEMPERATURE: 100.7 F | RESPIRATION RATE: 20 BRPM | SYSTOLIC BLOOD PRESSURE: 140 MMHG | WEIGHT: 222 LBS

## 2024-10-16 DIAGNOSIS — J06.9 ACUTE URI: Primary | ICD-10-CM

## 2024-10-16 PROCEDURE — G2211 COMPLEX E/M VISIT ADD ON: HCPCS | Performed by: FAMILY MEDICINE

## 2024-10-16 PROCEDURE — 99213 OFFICE O/P EST LOW 20 MIN: CPT | Performed by: FAMILY MEDICINE

## 2024-10-16 RX ORDER — PREDNISONE 20 MG/1
40 TABLET ORAL DAILY
Qty: 10 TABLET | Refills: 0 | Status: SHIPPED | OUTPATIENT
Start: 2024-10-16 | End: 2024-10-21 | Stop reason: SDUPTHER

## 2024-10-16 RX ORDER — DEXTROMETHORPHAN HYDROBROMIDE AND PROMETHAZINE HYDROCHLORIDE 15; 6.25 MG/5ML; MG/5ML
5 SYRUP ORAL 4 TIMES DAILY PRN
Qty: 240 ML | Refills: 0 | Status: SHIPPED | OUTPATIENT
Start: 2024-10-16

## 2024-10-16 RX ORDER — AZITHROMYCIN 250 MG/1
TABLET, FILM COATED ORAL
Qty: 6 TABLET | Refills: 0 | Status: SHIPPED | OUTPATIENT
Start: 2024-10-16 | End: 2024-10-21

## 2024-10-16 NOTE — PROGRESS NOTES
"Ambulatory Visit  Name: Casey Machado      : 1955      MRN: [id number]  Encounter Provider: Joycelyn Srivastava MD  Encounter Date: 10/16/2024   Encounter department: [facility]    Assessment & Plan  Acute URI  Counseled on supportive care.   Orders:    predniSONE 20 mg tablet; Take 2 tablets (40 mg total) by mouth daily for 5 days    azithromycin (Zithromax) 250 mg tablet; Take 2 tablets (500 mg total) by mouth daily for 1 day, THEN 1 tablet (250 mg total) daily for 4 days.    promethazine-dextromethorphan (PHENERGAN-DM) 6.25-15 mg/5 mL oral syrup; Take 5 mL by mouth 4 (four) times a day as needed for cough       History of Present Illness     Sinusitis  This is a new problem. The current episode started in the past 7 days (10/12/24). The problem is unchanged. The maximum temperature recorded prior to her arrival was 102 - 102.9 F. Associated symptoms include chills, congestion, coughing, ear pain, headaches, a hoarse voice, shortness of breath, sinus pressure and a sore throat. Pertinent negatives include no diaphoresis, neck pain, sneezing or swollen glands. Past treatments include acetaminophen and oral decongestants. The treatment provided no relief.         Review of Systems   Constitutional:  Positive for chills. Negative for diaphoresis.   HENT:  Positive for congestion, ear pain, hoarse voice, sinus pressure and sore throat. Negative for sneezing.    Respiratory:  Positive for cough and shortness of breath.    Musculoskeletal:  Negative for neck pain.   Neurological:  Positive for headaches.           Objective     /88   Pulse 104   Temp (!) 100.7 °F (38.2 °C)   Resp 20   Ht 5' 2\" (1.575 m)   Wt 101 kg (222 lb)   BMI 40.60 kg/m²     Physical Exam  Constitutional:       General: She is not in acute distress.     Appearance: Normal appearance. She is normal weight. She is not ill-appearing, toxic-appearing or diaphoretic.   HENT:      Head: Normocephalic and atraumatic.      Right " Ear: Tympanic membrane, ear canal and external ear normal. There is no impacted cerumen.      Left Ear: Tympanic membrane, ear canal and external ear normal. There is no impacted cerumen.      Nose: Nose normal.      Mouth/Throat:      Mouth: Mucous membranes are moist.      Pharynx: Oropharynx is clear. No oropharyngeal exudate or posterior oropharyngeal erythema.   Eyes:      General: No scleral icterus.        Right eye: No discharge.         Left eye: No discharge.      Extraocular Movements: Extraocular movements intact.      Conjunctiva/sclera: Conjunctivae normal.      Pupils: Pupils are equal, round, and reactive to light.   Cardiovascular:      Rate and Rhythm: Normal rate and regular rhythm.      Pulses: Normal pulses.      Heart sounds: Normal heart sounds. No murmur heard.     No friction rub. No gallop.   Pulmonary:      Effort: Pulmonary effort is normal. No respiratory distress.      Breath sounds: Normal breath sounds. No stridor. No wheezing, rhonchi or rales.   Chest:      Chest wall: No tenderness.   Musculoskeletal:         General: Normal range of motion.   Skin:     General: Skin is warm and dry.   Neurological:      General: No focal deficit present.      Mental Status: She is alert and oriented to person, place, and time.

## 2024-10-21 ENCOUNTER — TELEPHONE (OUTPATIENT)
Age: 69
End: 2024-10-21

## 2024-10-21 DIAGNOSIS — J45.20 MILD INTERMITTENT ASTHMA WITHOUT COMPLICATION: ICD-10-CM

## 2024-10-21 DIAGNOSIS — J06.9 ACUTE URI: ICD-10-CM

## 2024-10-21 RX ORDER — PREDNISONE 20 MG/1
40 TABLET ORAL DAILY
Qty: 10 TABLET | Refills: 0 | Status: SHIPPED | OUTPATIENT
Start: 2024-10-21 | End: 2024-10-26

## 2024-10-21 NOTE — TELEPHONE ENCOUNTER
Pt called in requesting to have a few more days of the steroid sent to the pharmacy. She states she took her last dose yesterday morning. She states she has improved, no longer has a sore throat, no fever however when she talks, it causes her to cough. She believes the steroids helped a lot, just might need a few extra days.  Pt is requesting a call back in regards to this. 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

## 2024-10-22 DIAGNOSIS — J45.20 MILD INTERMITTENT ASTHMA WITHOUT COMPLICATION: ICD-10-CM

## 2024-10-22 RX ORDER — ALBUTEROL SULFATE 90 UG/1
2 INHALANT RESPIRATORY (INHALATION) EVERY 6 HOURS PRN
Qty: 18 G | Refills: 0 | Status: SHIPPED | OUTPATIENT
Start: 2024-10-22

## 2024-10-23 RX ORDER — ALBUTEROL SULFATE 90 UG/1
2 INHALANT RESPIRATORY (INHALATION) EVERY 6 HOURS PRN
Qty: 18 G | Refills: 0 | OUTPATIENT
Start: 2024-10-23

## 2024-10-23 RX ORDER — IPRATROPIUM BROMIDE AND ALBUTEROL SULFATE 2.5; .5 MG/3ML; MG/3ML
3 SOLUTION RESPIRATORY (INHALATION) EVERY 6 HOURS PRN
Qty: 60 ML | Refills: 0 | Status: SHIPPED | OUTPATIENT
Start: 2024-10-23

## 2024-11-03 DIAGNOSIS — J01.90 ACUTE SINUSITIS, RECURRENCE NOT SPECIFIED, UNSPECIFIED LOCATION: ICD-10-CM

## 2024-11-03 RX ORDER — FLUTICASONE PROPIONATE 50 MCG
SPRAY, SUSPENSION (ML) NASAL
Qty: 16 ML | Refills: 0 | Status: SHIPPED | OUTPATIENT
Start: 2024-11-03

## 2024-11-05 DIAGNOSIS — I10 ESSENTIAL HYPERTENSION: ICD-10-CM

## 2024-11-06 DIAGNOSIS — F41.9 ANXIETY: ICD-10-CM

## 2024-11-06 RX ORDER — ESCITALOPRAM OXALATE 5 MG/1
5 TABLET ORAL DAILY
Qty: 30 TABLET | Refills: 5 | Status: SHIPPED | OUTPATIENT
Start: 2024-11-06

## 2024-11-06 RX ORDER — LOSARTAN POTASSIUM 100 MG/1
TABLET ORAL
Qty: 90 TABLET | Refills: 1 | Status: SHIPPED | OUTPATIENT
Start: 2024-11-06

## 2024-11-14 ENCOUNTER — OFFICE VISIT (OUTPATIENT)
Dept: OBGYN CLINIC | Facility: CLINIC | Age: 69
End: 2024-11-14
Payer: COMMERCIAL

## 2024-11-14 VITALS
SYSTOLIC BLOOD PRESSURE: 146 MMHG | BODY MASS INDEX: 41.99 KG/M2 | DIASTOLIC BLOOD PRESSURE: 90 MMHG | WEIGHT: 228.2 LBS | HEIGHT: 62 IN | HEART RATE: 77 BPM

## 2024-11-14 DIAGNOSIS — M25.562 CHRONIC PAIN OF BOTH KNEES: ICD-10-CM

## 2024-11-14 DIAGNOSIS — M17.0 PRIMARY OSTEOARTHRITIS OF BOTH KNEES: Primary | ICD-10-CM

## 2024-11-14 DIAGNOSIS — G89.29 CHRONIC PAIN OF BOTH KNEES: ICD-10-CM

## 2024-11-14 DIAGNOSIS — M25.561 CHRONIC PAIN OF BOTH KNEES: ICD-10-CM

## 2024-11-14 DIAGNOSIS — Z98.84 S/P GASTRIC BYPASS: ICD-10-CM

## 2024-11-14 PROCEDURE — 99214 OFFICE O/P EST MOD 30 MIN: CPT | Performed by: ORTHOPAEDIC SURGERY

## 2024-11-14 PROCEDURE — 20610 DRAIN/INJ JOINT/BURSA W/O US: CPT | Performed by: ORTHOPAEDIC SURGERY

## 2024-11-14 RX ADMIN — BUPIVACAINE HYDROCHLORIDE 2 ML: 5 INJECTION, SOLUTION EPIDURAL; INTRACAUDAL at 15:15

## 2024-11-14 RX ADMIN — TRIAMCINOLONE ACETONIDE 80 MG: 40 INJECTION, SUSPENSION INTRA-ARTICULAR; INTRAMUSCULAR at 15:15

## 2024-11-14 NOTE — PROGRESS NOTES
"Assessment/Plan:  1. Primary osteoarthritis of both knees  Large joint arthrocentesis: bilateral knee      2. Chronic pain of both knees  Large joint arthrocentesis: bilateral knee      3. Adult BMI 40.0-44.9 kg/sq m (HCC)  Large joint arthrocentesis: bilateral knee      4. S/P gastric bypass          Scribe Attestation      I,:  Russell Mathias PA-C am acting as a scribe while in the presence of the attending physician.:       I,:  Roman Rudd, DO personally performed the services described in this documentation    as scribed in my presence.:           Caroline is a pleasant 69-year-old presenting today for follow-up of her active related bilateral knee pain due to her severe end-stage underlying osteoarthritis, right greater than left.  She did very well with the previous cortisone injections, receiving approximately 2 and half months worth of relief.  Therefore, she consented to and underwent repeat bilateral knee injections as detailed below, which she tolerated well without difficulty or complication.  Postinjection instructions were provided.  We discussed that it is reasonable to try to wait until June for knee replacement given her position as a long-term .  We discussed that she would need to obtain a weight of approximately 215 pounds to have a BMI under 40.  She does not have diabetes and is not a smoker.  We will plan to see her back in 3 to 4 months pending efficacy of today's procedure.  She expressed understanding all of her questions were addressed today    Large joint arthrocentesis: bilateral knee  Universal Protocol:  Consent: Verbal consent obtained.  Risks and benefits: risks, benefits and alternatives were discussed  Consent given by: patient  Time out: Immediately prior to procedure a \"time out\" was called to verify the correct patient, procedure, equipment, support staff and site/side marked as required.  Timeout called at: 11/14/2024 3:33 PM.  Site marked: the " operative site was marked  Patient identity confirmed: verbally with patient  Supporting Documentation  Indications: pain   Procedure Details  Location: knee - bilateral knee  Preparation: Patient was prepped and draped in the usual sterile fashion  Needle size: 20 G  Ultrasound guidance: no  Approach: anterolateral    Medications (Right): 2 mL bupivacaine (PF) 0.5 %; 80 mg triamcinolone acetonide 40 mg/mLMedications (Left): 2 mL bupivacaine (PF) 0.5 %; 80 mg triamcinolone acetonide 40 mg/mL   Patient tolerance: patient tolerated the procedure well with no immediate complications  Dressing:  Sterile dressing applied        Subjective: Bilateral knee follow up    Patient ID: Casey Machado is a 69 y.o. female presenting today for follow-up of her active related bilateral knee pain due to her severe end-stage underlying osteoarthritis.  She underwent cortisone injections in July, which she states did provide significant relief for about 2-1/2 months.  Over the past 5 weeks, she has noted a return of her discomfort in the right greater than left knee.  She denies any new injuries.  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    Review of Systems   Constitutional:  Positive for activity change.   HENT: Negative.     Eyes: Negative.    Respiratory: Negative.     Cardiovascular: Negative.    Gastrointestinal: Negative.    Endocrine: Negative.    Genitourinary: Negative.    Musculoskeletal:  Positive for arthralgias, gait problem, joint swelling and myalgias.   Skin: Negative.    Allergic/Immunologic: Negative.    Hematological: Negative.    Psychiatric/Behavioral: Negative.       Past Medical History:   Diagnosis Date    Acute non-recurrent maxillary sinusitis 1/15/2023    Allergic rhinitis     last assessed 1/20/14, resolved 12/22/15    Anxiety     Arthritis     resolved 12/22/15    Asthma     Asthmatic bronchitis without complication 7/19/2018    Added  "automatically from request for surgery [id number]    Bariatric surgery status     Cancer (HCC)     derma fibroid sarcoma protuberance    Depression     \"situational\"    Environmental and seasonal allergies     \"smoke,perfume and mold some of my triggers\"    GERD (gastroesophageal reflux disease)     Hiatal hernia     History of cellulitis     Hyperlipidemia     \"a little high\"    Hypertension     Benign essential     Joint stiffness of knee     Low back pain     Lumbar disc disease     Morbid obesity (HCC)     Osteoarthritis     Postgastrectomy malabsorption     Pre-operative laboratory examination     Right knee pain     Shortness of breath     \"occas \"    Use of cane as ambulatory aid     \"for long distances\"       Past Surgical History:   Procedure Laterality Date     SECTION      last assessed 14    MOHS SURGERY Right     upper arm    ME EGD TRANSORAL BIOPSY SINGLE/MULTIPLE N/A 2018    Procedure: ESOPHAGOGASTRODUODENOSCOPY (EGD) with bx;  Surgeon: Matty Munoz MD;  Location: [facility];  Service: Bariatrics    ME LAPS GSTR RSTCV PX W/BYP AMISH-EN-Y LIMB <150 CM N/A 2018    Procedure: LAPAROSCOPIC AMISH-EN-Y GASTRIC BYPASS AND INTRAOPERATIVE EGD;  Surgeon: Matty Munoz MD;  Location: [facility];  Service: Bariatrics       Family History   Problem Relation Age of Onset    Hypertension Mother     Heart disease Father     Cancer Neg Hx     Diabetes Neg Hx     Thyroid disease Neg Hx        Social History     Occupational History    Not on file   Tobacco Use    Smoking status: Never     Passive exposure: Never    Smokeless tobacco: Never   Vaping Use    Vaping status: Never Used   Substance and Sexual Activity    Alcohol use: Yes     Comment: rare    Drug use: No    Sexual activity: Not on file         Current Outpatient Medications:     acetaminophen (TYLENOL) 500 mg tablet, Take 500 mg by mouth every 6 (six) hours as needed for mild pain, Disp: , Rfl:     albuterol (PROVENTIL HFA,VENTOLIN HFA) " 90 mcg/act inhaler, Inhale 2 puffs every 6 (six) hours as needed for wheezing or shortness of breath, Disp: 18 g, Rfl: 0    Cholecalciferol (VITAMIN D) 2000 units CAPS, Take 1 capsule by mouth every evening  , Disp: , Rfl:     escitalopram (LEXAPRO) 5 mg tablet, TAKE ONE TABLET BY MOUTH EVERY DAY, Disp: 30 tablet, Rfl: 5    fluticasone (FLONASE) 50 mcg/act nasal spray, USE 1 SPRAY IN EACH NOSTRIL DAILY (GENERIC FOR FLONASE), Disp: 16 mL, Rfl: 0    hydrOXYzine HCL (ATARAX) 25 mg tablet, TAKE ONE TABLET BY MOUTH AT BEDTIME (GENERIC FOR ATARAX), Disp: 30 tablet, Rfl: 1    ipratropium-albuterol (DUO-NEB) 0.5-2.5 mg/3 mL nebulizer solution, Take 3 mL by nebulization every 6 (six) hours as needed for wheezing or shortness of breath, Disp: 60 mL, Rfl: 0    levocetirizine (XYZAL) 5 MG tablet, TAKE ONE TABLET BY MOUTH EVERY DAY DURING SEASONAL ALLERGY SEASON (GENERIC FOR XYZAL), Disp: 90 tablet, Rfl: 1    LORazepam (Ativan) 0.5 mg tablet, Take 1 tablet (0.5 mg total) by mouth daily as needed for anxiety, Disp: 30 tablet, Rfl: 0    losartan (COZAAR) 100 MG tablet, TAKE ONE TABLET BY MOUTH EVERY DAY, Disp: 90 tablet, Rfl: 1    Multiple Vitamins-Minerals (WOMENS MULTIVITAMIN PO), Take 1 tablet by mouth daily at bedtime  , Disp: , Rfl:     omeprazole (PriLOSEC) 20 mg delayed release capsule, TAKE ONE CAPSULE BY MOUTH EVERY DAY (GENERIC FOR PRILOSEC), Disp: 100 capsule, Rfl: 1    promethazine-dextromethorphan (PHENERGAN-DM) 6.25-15 mg/5 mL oral syrup, Take 5 mL by mouth 4 (four) times a day as needed for cough, Disp: 240 mL, Rfl: 0    escitalopram (LEXAPRO) 10 mg tablet, TAKE 1 TABLET BY MOUTH EVERY DAY (Patient not taking: Reported on 11/14/2024), Disp: 90 tablet, Rfl: 1    No Known Allergies    Objective:  Vitals:    11/14/24 1459   BP: 146/90   Pulse: 77       Body mass index is 41.74 kg/m².    Right Knee Exam     Muscle Strength   The patient has normal right knee strength.    Tenderness   The patient is experiencing  tenderness in the lateral joint line and medial joint line.    Range of Motion   Extension:  5 abnormal   Flexion:  110 normal     Tests   Varus: negative Valgus: negative  Lachman:  Anterior - negative      Drawer:  Anterior - negative      Patellar apprehension: negative    Other   Erythema: absent  Scars: absent  Sensation: normal  Pulse: present  Swelling: none  Effusion: no effusion present    Comments:  Neutral alignment  + patellar crepitus  Bilaterally with stable 0, 30, 90 bilaterally  No increased erythema, warmth, effusion  Thigh calf soft nontender  Ambulates with an antalgic gait on the right without assistive device      Left Knee Exam     Muscle Strength   The patient has normal left knee strength.    Tenderness   The patient is experiencing tenderness in the medial joint line.    Range of Motion   Extension:  0 normal   Flexion:  110 normal     Tests   Varus: negative Valgus: negative  Lachman:  Anterior - negative      Drawer:  Anterior - negative       Patellar apprehension: negative    Other   Erythema: absent  Scars: absent  Sensation: normal  Pulse: present  Swelling: none  Effusion: no effusion present    Comments:  Varus alignment, passively correctable  + patellar crepitus          Observations   Left Knee   Negative for effusion.     Right Knee   Negative for effusion.       Physical Exam  Vitals and nursing note reviewed.   Constitutional:       Appearance: Normal appearance. She is well-developed.      Comments: Body mass index is 41.74 kg/m².   HENT:      Head: Normocephalic and atraumatic.      Right Ear: External ear normal.      Left Ear: External ear normal.   Eyes:      Extraocular Movements: Extraocular movements intact.      Conjunctiva/sclera: Conjunctivae normal.   Cardiovascular:      Rate and Rhythm: Normal rate.      Pulses: Normal pulses.   Pulmonary:      Effort: Pulmonary effort is normal.   Musculoskeletal:      Cervical back: Normal range of motion.      Right knee: No  effusion.      Left knee: No effusion.      Comments: See ortho exam   Skin:     General: Skin is warm and dry.   Neurological:      General: No focal deficit present.      Mental Status: She is alert and oriented to person, place, and time. Mental status is at baseline.   Psychiatric:         Mood and Affect: Mood normal.         Behavior: Behavior normal.         Thought Content: Thought content normal.         Judgment: Judgment normal.       This document was created using speech voice recognition software.   Grammatical errors, random word insertions, pronoun errors, and incomplete sentences are an occasional consequence of this system due to software limitations, ambient noise, and hardware issues.   Any formal questions or concerns about content, text, or information contained within the body of this dictation should be directly addressed to the provider for clarification.

## 2024-11-15 RX ORDER — BUPIVACAINE HYDROCHLORIDE 5 MG/ML
2 INJECTION, SOLUTION EPIDURAL; INTRACAUDAL
Status: COMPLETED | OUTPATIENT
Start: 2024-11-14 | End: 2024-11-14

## 2024-11-15 RX ORDER — TRIAMCINOLONE ACETONIDE 40 MG/ML
80 INJECTION, SUSPENSION INTRA-ARTICULAR; INTRAMUSCULAR
Status: COMPLETED | OUTPATIENT
Start: 2024-11-14 | End: 2024-11-14

## 2024-12-06 DIAGNOSIS — F41.9 ANXIETY: ICD-10-CM

## 2024-12-06 DIAGNOSIS — G47.00 INSOMNIA, UNSPECIFIED TYPE: ICD-10-CM

## 2024-12-09 RX ORDER — HYDROXYZINE HYDROCHLORIDE 25 MG/1
25 TABLET, FILM COATED ORAL
Qty: 30 TABLET | Refills: 1 | Status: SHIPPED | OUTPATIENT
Start: 2024-12-09

## 2024-12-31 DIAGNOSIS — E66.01 MORBID (SEVERE) OBESITY DUE TO EXCESS CALORIES (HCC): ICD-10-CM

## 2025-01-25 DIAGNOSIS — T78.40XD ALLERGY, SUBSEQUENT ENCOUNTER: ICD-10-CM

## 2025-01-27 DIAGNOSIS — T78.40XD ALLERGY, SUBSEQUENT ENCOUNTER: ICD-10-CM

## 2025-01-27 RX ORDER — LEVOCETIRIZINE DIHYDROCHLORIDE 5 MG/1
TABLET, FILM COATED ORAL
Qty: 90 TABLET | Refills: 1 | Status: SHIPPED | OUTPATIENT
Start: 2025-01-27

## 2025-01-27 RX ORDER — LEVOCETIRIZINE DIHYDROCHLORIDE 5 MG/1
TABLET, FILM COATED ORAL
Qty: 90 TABLET | Refills: 1 | OUTPATIENT
Start: 2025-01-27

## 2025-01-27 NOTE — TELEPHONE ENCOUNTER
Pharmacy called for refill on Levocetirizine 5 mg.   Alert and oriented, no focal deficits, no motor or sensory deficits.

## 2025-02-10 DIAGNOSIS — F41.9 ANXIETY: ICD-10-CM

## 2025-02-10 DIAGNOSIS — G47.00 INSOMNIA, UNSPECIFIED TYPE: ICD-10-CM

## 2025-02-11 RX ORDER — HYDROXYZINE HYDROCHLORIDE 25 MG/1
25 TABLET, FILM COATED ORAL
Qty: 30 TABLET | Refills: 1 | Status: SHIPPED | OUTPATIENT
Start: 2025-02-11

## 2025-02-18 ENCOUNTER — OFFICE VISIT (OUTPATIENT)
Dept: FAMILY MEDICINE CLINIC | Facility: CLINIC | Age: 70
End: 2025-02-18
Payer: COMMERCIAL

## 2025-02-18 VITALS
WEIGHT: 224.2 LBS | DIASTOLIC BLOOD PRESSURE: 88 MMHG | RESPIRATION RATE: 17 BRPM | BODY MASS INDEX: 41.26 KG/M2 | TEMPERATURE: 98.6 F | HEART RATE: 92 BPM | HEIGHT: 62 IN | SYSTOLIC BLOOD PRESSURE: 125 MMHG

## 2025-02-18 DIAGNOSIS — Z12.11 SCREENING FOR COLON CANCER: ICD-10-CM

## 2025-02-18 DIAGNOSIS — Z12.31 ENCOUNTER FOR SCREENING MAMMOGRAM FOR BREAST CANCER: ICD-10-CM

## 2025-02-18 DIAGNOSIS — H93.13 TINNITUS OF BOTH EARS: ICD-10-CM

## 2025-02-18 DIAGNOSIS — F32.1 MODERATE MAJOR DEPRESSION (HCC): ICD-10-CM

## 2025-02-18 DIAGNOSIS — Z78.0 POST-MENOPAUSAL: ICD-10-CM

## 2025-02-18 DIAGNOSIS — Z11.59 NEED FOR HEPATITIS C SCREENING TEST: ICD-10-CM

## 2025-02-18 DIAGNOSIS — Z00.00 WELCOME TO MEDICARE PREVENTIVE VISIT: Primary | ICD-10-CM

## 2025-02-18 DIAGNOSIS — Z13.83 SCREENING FOR CARDIOVASCULAR, RESPIRATORY, AND GENITOURINARY DISEASES: ICD-10-CM

## 2025-02-18 DIAGNOSIS — Z13.89 SCREENING FOR CARDIOVASCULAR, RESPIRATORY, AND GENITOURINARY DISEASES: ICD-10-CM

## 2025-02-18 DIAGNOSIS — Z13.6 SCREENING FOR CARDIOVASCULAR, RESPIRATORY, AND GENITOURINARY DISEASES: ICD-10-CM

## 2025-02-18 DIAGNOSIS — J01.90 ACUTE SINUSITIS, RECURRENCE NOT SPECIFIED, UNSPECIFIED LOCATION: ICD-10-CM

## 2025-02-18 PROCEDURE — G0402 INITIAL PREVENTIVE EXAM: HCPCS | Performed by: FAMILY MEDICINE

## 2025-02-18 PROCEDURE — 99214 OFFICE O/P EST MOD 30 MIN: CPT | Performed by: FAMILY MEDICINE

## 2025-02-18 PROCEDURE — G2211 COMPLEX E/M VISIT ADD ON: HCPCS | Performed by: FAMILY MEDICINE

## 2025-02-18 RX ORDER — AZITHROMYCIN 250 MG/1
TABLET, FILM COATED ORAL
Qty: 6 TABLET | Refills: 0 | Status: SHIPPED | OUTPATIENT
Start: 2025-02-18 | End: 2025-02-23

## 2025-02-18 NOTE — PATIENT INSTRUCTIONS
Medicare Preventive Visit Patient Instructions  Thank you for completing your Welcome to Medicare Visit or Medicare Annual Wellness Visit today. Your next wellness visit will be due in one year (2/19/2026).  The screening/preventive services that you may require over the next 5-10 years are detailed below. Some tests may not apply to you based off risk factors and/or age. Screening tests ordered at today's visit but not completed yet may show as past due. Also, please note that scanned in results may not display below.  Preventive Screenings:  Service Recommendations Previous Testing/Comments   Colorectal Cancer Screening  * Colonoscopy    * Fecal Occult Blood Test (FOBT)/Fecal Immunochemical Test (FIT)  * Fecal DNA/Cologuard Test  * Flexible Sigmoidoscopy Age: 45-75 years old   Colonoscopy: every 10 years (may be performed more frequently if at higher risk)  OR  FOBT/FIT: every 1 year  OR  Cologuard: every 3 years  OR  Sigmoidoscopy: every 5 years  Screening may be recommended earlier than age 45 if at higher risk for colorectal cancer. Also, an individualized decision between you and your healthcare provider will decide whether screening between the ages of 76-85 would be appropriate. Colonoscopy: Not on file  FOBT/FIT: Not on file  Cologuard: Not on file  Sigmoidoscopy: Not on file          Breast Cancer Screening Age: 40+ years old  Frequency: every 1-2 years  Not required if history of left and right mastectomy Mammogram: Not on file        Cervical Cancer Screening Between the ages of 21-29, pap smear recommended once every 3 years.   Between the ages of 30-65, can perform pap smear with HPV co-testing every 5 years.   Recommendations may differ for women with a history of total hysterectomy, cervical cancer, or abnormal pap smears in past. Pap Smear: Not on file    Screening Not Indicated   Hepatitis C Screening Once for adults born between 1945 and 1965  More frequently in patients at high risk for Hepatitis  C Hep C Antibody: Not on file        Diabetes Screening 1-2 times per year if you're at risk for diabetes or have pre-diabetes Fasting glucose: 86 mg/dL (1/26/2024)  A1C: No results in last 5 years (No results in last 5 years)      Cholesterol Screening Once every 5 years if you don't have a lipid disorder. May order more often based on risk factors. Lipid panel: 01/26/2024    Screening Current     Other Preventive Screenings Covered by Medicare:  Abdominal Aortic Aneurysm (AAA) Screening: covered once if your at risk. You're considered to be at risk if you have a family history of AAA.  Lung Cancer Screening: covers low dose CT scan once per year if you meet all of the following conditions: (1) Age 55-77; (2) No signs or symptoms of lung cancer; (3) Current smoker or have quit smoking within the last 15 years; (4) You have a tobacco smoking history of at least 20 pack years (packs per day multiplied by number of years you smoked); (5) You get a written order from a healthcare provider.  Glaucoma Screening: covered annually if you're considered high risk: (1) You have diabetes OR (2) Family history of glaucoma OR (3)  aged 50 and older OR (4)  American aged 65 and older  Osteoporosis Screening: covered every 2 years if you meet one of the following conditions: (1) You're estrogen deficient and at risk for osteoporosis based off medical history and other findings; (2) Have a vertebral abnormality; (3) On glucocorticoid therapy for more than 3 months; (4) Have primary hyperparathyroidism; (5) On osteoporosis medications and need to assess response to drug therapy.   Last bone density test (DXA Scan): Not on file.  HIV Screening: covered annually if you're between the age of 15-65. Also covered annually if you are younger than 15 and older than 65 with risk factors for HIV infection. For pregnant patients, it is covered up to 3 times per pregnancy.    Immunizations:  Immunization Recommendations    Influenza Vaccine Annual influenza vaccination during flu season is recommended for all persons aged >= 6 months who do not have contraindications   Pneumococcal Vaccine   * Pneumococcal conjugate vaccine = PCV13 (Prevnar 13), PCV15 (Vaxneuvance), PCV20 (Prevnar 20)  * Pneumococcal polysaccharide vaccine = PPSV23 (Pneumovax) Adults 19-65 yo with certain risk factors or if 65+ yo  If never received any pneumonia vaccine: recommend Prevnar 20 (PCV20)  Give PCV20 if previously received 1 dose of PCV13 or PPSV23   Hepatitis B Vaccine 3 dose series if at intermediate or high risk (ex: diabetes, end stage renal disease, liver disease)   Respiratory syncytial virus (RSV) Vaccine - COVERED BY MEDICARE PART D  * RSVPreF3 (Arexvy) CDC recommends that adults 60 years of age and older may receive a single dose of RSV vaccine using shared clinical decision-making (SCDM)   Tetanus (Td) Vaccine - COST NOT COVERED BY MEDICARE PART B Following completion of primary series, a booster dose should be given every 10 years to maintain immunity against tetanus. Td may also be given as tetanus wound prophylaxis.   Tdap Vaccine - COST NOT COVERED BY MEDICARE PART B Recommended at least once for all adults. For pregnant patients, recommended with each pregnancy.   Shingles Vaccine (Shingrix) - COST NOT COVERED BY MEDICARE PART B  2 shot series recommended in those 19 years and older who have or will have weakened immune systems or those 50 years and older     Health Maintenance Due:      Topic Date Due   • Hepatitis C Screening  Never done   • Breast Cancer Screening: Mammogram  Never done   • Colorectal Cancer Screening  Never done     Immunizations Due:      Topic Date Due   • Influenza Vaccine (1) 09/01/2024   • COVID-19 Vaccine (1 - 2024-25 season) Never done     Advance Directives   What are advance directives?  Advance directives are legal documents that state your wishes and plans for medical care. These plans are made ahead of time  in case you lose your ability to make decisions for yourself. Advance directives can apply to any medical decision, such as the treatments you want, and if you want to donate organs.   What are the types of advance directives?  There are many types of advance directives, and each state has rules about how to use them. You may choose a combination of any of the following:  Living will:  This is a written record of the treatment you want. You can also choose which treatments you do not want, which to limit, and which to stop at a certain time. This includes surgery, medicine, IV fluid, and tube feedings.   Durable power of  for healthcare (DPAHC):  This is a written record that states who you want to make healthcare choices for you when you are unable to make them for yourself. This person, called a proxy, is usually a family member or a friend. You may choose more than 1 proxy.  Do not resuscitate (DNR) order:  A DNR order is used in case your heart stops beating or you stop breathing. It is a request not to have certain forms of treatment, such as CPR. A DNR order may be included in other types of advance directives.  Medical directive:  This covers the care that you want if you are in a coma, near death, or unable to make decisions for yourself. You can list the treatments you want for each condition. Treatment may include pain medicine, surgery, blood transfusions, dialysis, IV or tube feedings, and a ventilator (breathing machine).  Values history:  This document has questions about your views, beliefs, and how you feel and think about life. This information can help others choose the care that you would choose.  Why are advance directives important?  An advance directive helps you control your care. Although spoken wishes may be used, it is better to have your wishes written down. Spoken wishes can be misunderstood, or not followed. Treatments may be given even if you do not want them. An advance  directive may make it easier for your family to make difficult choices about your care.   Fall Prevention    Fall prevention  includes ways to make your home and other areas safer. It also includes ways you can move more carefully to prevent a fall. Health conditions that cause changes in your blood pressure, vision, or muscle strength and coordination may increase your risk for falls. Medicines may also increase your risk for falls if they make you dizzy, weak, or sleepy.   Fall prevention tips:   Stand or sit up slowly.    Use assistive devices as directed.    Wear shoes that fit well and have soles that .    Wear a personal alarm.    Stay active.    Manage your medical conditions.    Home Safety Tips:  Add items to prevent falls in the bathroom.    Keep paths clear.    Install bright lights in your home.    Keep items you use often on shelves within reach.    Paint or place reflective tape on the edges of your stairs.    Weight Management   Why it is important to manage your weight:  Being overweight increases your risk of health conditions such as heart disease, high blood pressure, type 2 diabetes, and certain types of cancer. It can also increase your risk for osteoarthritis, sleep apnea, and other respiratory problems. Aim for a slow, steady weight loss. Even a small amount of weight loss can lower your risk of health problems.  How to lose weight safely:  A safe and healthy way to lose weight is to eat fewer calories and get regular exercise. You can lose up about 1 pound a week by decreasing the number of calories you eat by 500 calories each day.   Healthy meal plan for weight management:  A healthy meal plan includes a variety of foods, contains fewer calories, and helps you stay healthy. A healthy meal plan includes the following:  Eat whole-grain foods more often.  A healthy meal plan should contain fiber. Fiber is the part of grains, fruits, and vegetables that is not broken down by your body.  Whole-grain foods are healthy and provide extra fiber in your diet. Some examples of whole-grain foods are whole-wheat breads and pastas, oatmeal, brown rice, and bulgur.  Eat a variety of vegetables every day.  Include dark, leafy greens such as spinach, kale, nikole greens, and mustard greens. Eat yellow and orange vegetables such as carrots, sweet potatoes, and winter squash.   Eat a variety of fruits every day.  Choose fresh or canned fruit (canned in its own juice or light syrup) instead of juice. Fruit juice has very little or no fiber.  Eat low-fat dairy foods.  Drink fat-free (skim) milk or 1% milk. Eat fat-free yogurt and low-fat cottage cheese. Try low-fat cheeses such as mozzarella and other reduced-fat cheeses.  Choose meat and other protein foods that are low in fat.  Choose beans or other legumes such as split peas or lentils. Choose fish, skinless poultry (chicken or turkey), or lean cuts of red meat (beef or pork). Before you cook meat or poultry, cut off any visible fat.   Use less fat and oil.  Try baking foods instead of frying them. Add less fat, such as margarine, sour cream, regular salad dressing and mayonnaise to foods. Eat fewer high-fat foods. Some examples of high-fat foods include french fries, doughnuts, ice cream, and cakes.  Eat fewer sweets.  Limit foods and drinks that are high in sugar. This includes candy, cookies, regular soda, and sweetened drinks.  Exercise:  Exercise at least 30 minutes per day on most days of the week. Some examples of exercise include walking, biking, dancing, and swimming. You can also fit in more physical activity by taking the stairs instead of the elevator or parking farther away from stores. Ask your healthcare provider about the best exercise plan for you.      © Copyright [employer] 2018 Information is for End User's use only and may not be sold, redistributed or otherwise used for commercial purposes. All illustrations and images included in  CareNotes® are the copyrighted property of [employer] or [employer]

## 2025-02-18 NOTE — PROGRESS NOTES
Name: Casey Machado      : 1955      MRN: [id number]  Encounter Provider: Joycelyn Srivastava MD  Encounter Date: 2025   Encounter department: [facility]    Assessment & Plan  Welcome to Medicare preventive visit         Moderate major depression (HCC)  Continue lexapro.        Adult BMI 40.0-44.9 kg/sq m (HCC)  Counseled on dietary modifications.        Acute sinusitis, recurrence not specified, unspecified location    Orders:    azithromycin (Zithromax) 250 mg tablet; Take 2 tablets (500 mg total) by mouth daily for 1 day, THEN 1 tablet (250 mg total) daily for 4 days.    Encounter for screening mammogram for breast cancer    Orders:    Mammo screening bilateral w 3d and cad; Future    Need for hepatitis C screening test    Orders:    Hepatitis C Antibody; Future    Post-menopausal    Orders:    DXA bone density spine hip and pelvis; Future    Screening for colon cancer    Orders:    Cologuard    Screening for cardiovascular, respiratory, and genitourinary diseases    Orders:    Comprehensive metabolic panel; Future    Lipid Panel with Direct LDL reflex; Future    Tinnitus of both ears    Orders:    Ambulatory Referral to Otolaryngology; Future      Depression Screening and Follow-up Plan: Patient was screened for depression during today's encounter. They screened negative with a PHQ-9 score of 0.      Falls Plan of Care: balance, strength, and gait training instructions were provided.       Preventive health issues were discussed with patient, and age appropriate screening tests were ordered as noted in patient's After Visit Summary. Personalized health advice and appropriate referrals for health education or preventive services given if needed, as noted in patient's After Visit Summary.    History of Present Illness     Sinus Problem  This is a new problem. The current episode started in the past 7 days. The problem has been gradually worsening since onset. There has been no fever.  Associated symptoms include chills, congestion, coughing, ear pain, headaches, a hoarse voice, sinus pressure, sneezing and a sore throat. Pertinent negatives include no diaphoresis, neck pain, shortness of breath or swollen glands. Past treatments include acetaminophen.      Patient Care Team:  Joycelyn Srivasatva MD as PCP - General (Family Medicine)  Joycelyn Srivastava MD as PCP - PCP-[facility] ([facility])    Review of Systems   Constitutional:  Positive for chills. Negative for diaphoresis.   HENT:  Positive for congestion, ear pain, hoarse voice, sinus pressure, sneezing and sore throat.    Respiratory:  Positive for cough. Negative for shortness of breath.    Musculoskeletal:  Negative for neck pain.   Neurological:  Positive for headaches.     Medical History Reviewed by provider this encounter:  Tobacco  Allergies  Meds  Problems  Med Hx  Surg Hx  Fam Hx       Annual Wellness Visit Questionnaire   Casey is here for her Welcome to Medicare visit.     Health Risk Assessment:   Patient rates overall health as fair. Patient feels that their physical health rating is same. Patient is satisfied with their life. Eyesight was rated as same. Hearing was rated as slightly worse. Patient feels that their emotional and mental health rating is same. Patients states they are sometimes angry. Patient states they are sometimes unusually tired/fatigued. Pain experienced in the last 7 days has been some. Patient's pain rating has been 5/10. Patient states that she has experienced no weight loss or gain in last 6 months.     Depression Screening:   PHQ-9 Score: 0      Fall Risk Screening:   In the past year, patient has experienced: history of falling in past year    Number of falls: 1  Injured during fall?: Yes    Feels unsteady when standing or walking?: No    Worried about falling?: No      Urinary Incontinence Screening:   Patient has not leaked urine accidently in the last six months.     Home Safety:  Patient does not  have trouble with stairs inside or outside of their home. Patient has working smoke alarms and has working carbon monoxide detector. Home safety hazards include: none.     Nutrition:   Current diet is Regular.     Medications:   Patient is currently taking over-the-counter supplements. OTC medications include: see medication list. Patient is able to manage medications.     Activities of Daily Living (ADLs)/Instrumental Activities of Daily Living (IADLs):   Walk and transfer into and out of bed and chair?: Yes  Dress and groom yourself?: Yes    Bathe or shower yourself?: Yes    Feed yourself? Yes  Do your laundry/housekeeping?: Yes  Manage your money, pay your bills and track your expenses?: Yes  Make your own meals?: Yes    Do your own shopping?: Yes    Previous Hospitalizations:   Any hospitalizations or ED visits within the last 12 months?: No      Advance Care Planning:   Living will: No    Durable POA for healthcare: No      PREVENTIVE SCREENINGS      Cardiovascular Screening:    General: Risks and Benefits Discussed    Due for: Lipid Panel      Diabetes Screening:     General: Risks and Benefits Discussed    Due for: Blood Glucose      Colorectal Cancer Screening:     General: Risks and Benefits Discussed    Due for: Cologuard      Breast Cancer Screening:     General: Risks and Benefits Discussed    Due for: Mammogram        Cervical Cancer Screening:    General: Screening Not Indicated      Osteoporosis Screening:    General: Risks and Benefits Discussed    Due for: DXA Axial      Abdominal Aortic Aneurysm (AAA) Screening:        General: Screening Not Indicated      Lung Cancer Screening:     General: Screening Not Indicated      Hepatitis C Screening:    General: Risks and Benefits Discussed    Hep C Screening Accepted: Yes      Screening, Brief Intervention, and Referral to Treatment (SBIRT)     Screening  Typical number of drinks in a day: 0  Typical number of drinks in a week: 2  Interpretation: Low risk  "drinking behavior.    Single Item Drug Screening:  How often have you used an illegal drug (including marijuana) or a prescription medication for non-medical reasons in the past year? never    Single Item Drug Screen Score: 0  Interpretation: Negative screen for possible drug use disorder    Brief Intervention  Alcohol & drug use screenings were reviewed. No concerns regarding substance use disorder identified.     Social Drivers of Health     Food Insecurity: No Food Insecurity (2/18/2025)    Hunger Vital Sign     Worried About Running Out of Food in the Last Year: Never true     Ran Out of Food in the Last Year: Never true   Transportation Needs: No Transportation Needs (2/18/2025)    PRAPARE - Transportation     Lack of Transportation (Medical): No     Lack of Transportation (Non-Medical): No   Housing Stability: Low Risk  (2/18/2025)    Housing Stability Vital Sign     Unable to Pay for Housing in the Last Year: No     Number of Times Moved in the Last Year: 0     Homeless in the Last Year: No   Utilities: Not At Risk (2/18/2025)    [facility] Utilities     Threatened with loss of utilities: No     Vision Screening    Right eye Left eye Both eyes   Without correction 20/25 20/30 20/30   With correction          Objective   /88   Pulse 92   Temp 98.6 °F (37 °C)   Resp 17   Ht 5' 2\" (1.575 m)   Wt 102 kg (224 lb 3.2 oz)   BMI 41.01 kg/m²     Physical Exam  Vitals and nursing note reviewed.   Constitutional:       General: She is not in acute distress.     Appearance: She is well-developed.   HENT:      Head: Normocephalic and atraumatic.      Right Ear: Tympanic membrane, ear canal and external ear normal. There is no impacted cerumen.      Left Ear: Tympanic membrane, ear canal and external ear normal. There is no impacted cerumen.      Nose: Nose normal.      Mouth/Throat:      Mouth: Mucous membranes are moist.   Eyes:      Conjunctiva/sclera: Conjunctivae normal.   Cardiovascular:      Rate and Rhythm: " Normal rate and regular rhythm.      Heart sounds: No murmur heard.  Pulmonary:      Effort: Pulmonary effort is normal. No respiratory distress.      Breath sounds: Normal breath sounds.   Abdominal:      General: Abdomen is flat. There is no distension.      Palpations: Abdomen is soft. There is no mass.      Tenderness: There is no abdominal tenderness. There is no guarding or rebound.      Hernia: No hernia is present.   Musculoskeletal:         General: Normal range of motion.      Cervical back: Neck supple.   Skin:     General: Skin is warm and dry.   Neurological:      General: No focal deficit present.      Mental Status: She is alert and oriented to person, place, and time.

## 2025-02-24 ENCOUNTER — NURSE TRIAGE (OUTPATIENT)
Age: 70
End: 2025-02-24

## 2025-02-24 DIAGNOSIS — J01.90 ACUTE SINUSITIS, RECURRENCE NOT SPECIFIED, UNSPECIFIED LOCATION: Primary | ICD-10-CM

## 2025-02-24 RX ORDER — AZITHROMYCIN 250 MG/1
TABLET, FILM COATED ORAL
Qty: 6 TABLET | Refills: 0 | Status: SHIPPED | OUTPATIENT
Start: 2025-02-24 | End: 2025-03-01

## 2025-02-24 NOTE — TELEPHONE ENCOUNTER
"Patient was seen on 2/18, for cold like symptoms.  She was given a Zpak.  Patient calls in today with some left side facial puffiness.  Denies pain or fever.  Still has cough with yellow sputum production.   She feels she may need another round with the Zpakl.    Please advise and call patient back with recommendation.      Preferred Pharmacy:   [id number] - [place] [id number] [facility] [address] Phone: [phone]   Fax: [phone]        Reason for Disposition   Patient wants to be seen    Answer Assessment - Initial Assessment Questions  1. ONSET: \"When did the swelling start?\" (e.g., minutes, hours, days)      2/22  2. LOCATION: \"What part of the face is swollen?\" (e.g., cheek, entire face, jaw joint area, under jaw)      Left side of face is swollen an puffy.   3. SEVERITY: \"How swollen is it?\"      Mild  4. ITCHING: \"Is there any itching?\" If Yes, ask: \"How much?\"   (Scale 1-10; mild, moderate or severe)      Denies   5. PAIN: \"Is the swelling painful to touch?\" If Yes, ask: \"How painful is it?\"   (Scale 0-10; mild, moderate or severe)      Denies   6. FEVER: \"Do you have a fever?\" If Yes, ask: \"What is it, how was it measured, and when did it start?\"       Denies   7. CAUSE: \"What do you think is causing the face swelling?\"      Sinus infection  8. NEW MEDICINES: \"Have there been any new medicines started recently?\"      N/A  9. RECURRENT SYMPTOM: \"Have you had face swelling before?\" If Yes, ask: \"When was the last time?\" \"What happened that time?\"      Yes   10. OTHER SYMPTOMS: \"Do you have any other symptoms?\" (e.g., leg swelling, toothache)  Runny nose, cough with yellow sputum    Protocols used: Face Swelling-Adult-[place]    "

## 2025-04-07 ENCOUNTER — OFFICE VISIT (OUTPATIENT)
Dept: AUDIOLOGY | Facility: CLINIC | Age: 70
End: 2025-04-07
Payer: COMMERCIAL

## 2025-04-07 ENCOUNTER — OFFICE VISIT (OUTPATIENT)
Dept: OTOLARYNGOLOGY | Facility: CLINIC | Age: 70
End: 2025-04-07
Payer: COMMERCIAL

## 2025-04-07 VITALS — BODY MASS INDEX: 39.27 KG/M2 | WEIGHT: 221.6 LBS | HEIGHT: 63 IN | OXYGEN SATURATION: 100 %

## 2025-04-07 DIAGNOSIS — H90.3 SENSORINEURAL HEARING LOSS, BILATERAL: Primary | ICD-10-CM

## 2025-04-07 DIAGNOSIS — H90.3 SENSORINEURAL HEARING LOSS (SNHL), BILATERAL: ICD-10-CM

## 2025-04-07 DIAGNOSIS — H93.13 TINNITUS OF BOTH EARS: Primary | ICD-10-CM

## 2025-04-07 DIAGNOSIS — H69.93 ETD (EUSTACHIAN TUBE DYSFUNCTION), BILATERAL: ICD-10-CM

## 2025-04-07 PROCEDURE — 92557 COMPREHENSIVE HEARING TEST: CPT | Performed by: AUDIOLOGIST

## 2025-04-07 PROCEDURE — 99203 OFFICE O/P NEW LOW 30 MIN: CPT | Performed by: NURSE PRACTITIONER

## 2025-04-07 PROCEDURE — 92567 TYMPANOMETRY: CPT | Performed by: AUDIOLOGIST

## 2025-04-07 RX ORDER — AZELASTINE 1 MG/ML
1 SPRAY, METERED NASAL 2 TIMES DAILY
Qty: 1 ML | Refills: 2 | Status: SHIPPED | OUTPATIENT
Start: 2025-04-07

## 2025-04-07 NOTE — PROGRESS NOTES
ENT HEARING EVALUATION    Name:  Casey Machado  :  1955  Age:  69 y.o.   Date of Evaluation: 25     History: ENT Audiogram  Reason for visit: Casey Machado is being seen today for an evaluation of hearing as part of their ENT visit. Patient has a known bilateral moderate sensorineural hearing loss. She reports non-lateralizing hissing tinnitus.    EVALUATION:    Otoscopic Evaluation:   Performed at ENT    Tympanometry:   Right: Type As - reduced compliance   Left: Type As - reduced compliance    Audiogram Results:  Mild to moderate sensorineural hearing loss. Stable from last evaluation 24.    *see attached audiogram      RECOMMENDATIONS:  Return to ENT for follow up.  Patient expressed interest in HAE, but not sure of insurance coverage for hearing aids. Provided with DVR information in case she does not have HA coverage.  Patient to schedule HAE.    Kartik Contreras.  Clinical Audiologist

## 2025-04-07 NOTE — PATIENT INSTRUCTIONS
Tinnitus and possible causes including medications, viral illness, inner ear disorder, TMJ syndrome, or hearing changes.   Options for bilateral tinnitus including adding background noise, tinnitus retraining therapy, masking device, Resound tinnitus matias, Acupuncture, or acceptance.  Limit caffeine and ibuprofen intake. Discouraged q-tip use due to damage of ear canal.  Consider Flonase daily for eustachian tube dysfunction.  No specific medications or surgery indicated for treatment of tinnitus.

## 2025-04-07 NOTE — PROGRESS NOTES
":  Assessment & Plan  Tinnitus of both ears    Orders:    Ambulatory Referral to Otolaryngology    Ambulatory referral to Audiology    Sensorineural hearing loss (SNHL), bilateral    Orders:    Ambulatory referral to Audiology    ETD (Eustachian tube dysfunction), bilateral    Orders:    azelastine (ASTELIN) 0.1 % nasal spray; 1 spray into each nostril 2 (two) times a day Use in each nostril as directed      On exam, no cerumen debris, no otorrhea, no serous otitis media, bilateral TM intact.     Audiogram completed today indicated bilateral mild to moderate SNHL, tympe Type A bilaterally  Word discrimination 96% bilaterally  Compared to prior testing 08/2024 and hearing is overall unchanged.     Reassured no evidence of fluid on exam or with audiogram today.     Typical causes of SNHL include maturity changes, noise exposure, and hereditary risk factors.   Offered options for bilateral SNHL including acceptance, lifestyle changes such as moving closer to those who are speaking, or hearing amplification.  She would qualify for hearing amplification based on audiogram.  Discussed hearing aid options including facilities to obtain a hearing aid from as well as costs and benefits.  Discussed that quality of life may improve with hearing amplification.       Considering \"noises\" crackling in her ears may be due to ETD - eustachian tubes pening and closing.   Reviewed treatment options for ETD including nasal steroids, oral steroids, decongestants, watchful monitoring for up to 3 months    Follow annually if she chooses        History of Present Illness     Casey Machado is a 69 y.o. female   Presents today as a new patient due to ear concerns.  Hearing gradually worsening.  Bilateral ears feel blocked.  Hissing tinnitus.  No otalgia or otorrhea.  No history of ear surgery.  No current hearing aids.  Crackling left ear at times.   Hearing test 08/2024          Review of Systems   Constitutional: Negative.    HENT:  " "Positive for hearing loss. Negative for congestion, ear discharge, ear pain, nosebleeds, postnasal drip, rhinorrhea, sinus pressure, sinus pain, sore throat, tinnitus and voice change.    Respiratory:  Negative for chest tightness and shortness of breath.    Skin:  Negative for color change.   Neurological:  Negative for dizziness, numbness and headaches.   Psychiatric/Behavioral: Negative.       Objective   Ht 5' 3\" (1.6 m)   Wt 101 kg (221 lb 9.6 oz)   SpO2 100%   BMI 39.25 kg/m²      Physical Exam  Vitals and nursing note reviewed.   Constitutional:       General: She is not in acute distress.     Appearance: She is well-developed.   HENT:      Head: Normocephalic and atraumatic.      Right Ear: Tympanic membrane, ear canal and external ear normal.      Left Ear: Tympanic membrane, ear canal and external ear normal.      Nose: Nose normal.      Mouth/Throat:      Mouth: Mucous membranes are moist.   Pulmonary:      Effort: Pulmonary effort is normal. No respiratory distress.   Musculoskeletal:      Cervical back: Neck supple.   Skin:     General: Skin is warm and dry.   Neurological:      Mental Status: She is alert.   Psychiatric:         Mood and Affect: Mood normal.           "

## 2025-04-15 DIAGNOSIS — F41.9 ANXIETY: ICD-10-CM

## 2025-04-16 RX ORDER — LORAZEPAM 0.5 MG/1
TABLET ORAL
Qty: 30 TABLET | Refills: 0 | Status: SHIPPED | OUTPATIENT
Start: 2025-04-16

## 2025-04-17 ENCOUNTER — HOSPITAL ENCOUNTER (OUTPATIENT)
Dept: RADIOLOGY | Facility: HOSPITAL | Age: 70
Discharge: HOME | End: 2025-04-17
Payer: COMMERCIAL

## 2025-04-17 VITALS — BODY MASS INDEX: 37.73 KG/M2 | WEIGHT: 221 LBS | HEIGHT: 64 IN

## 2025-04-17 DIAGNOSIS — Z78.0 POST-MENOPAUSAL: ICD-10-CM

## 2025-04-17 PROCEDURE — 77080 DXA BONE DENSITY AXIAL: CPT

## 2025-04-18 ENCOUNTER — RESULTS FOLLOW-UP (OUTPATIENT)
Dept: FAMILY MEDICINE CLINIC | Facility: CLINIC | Age: 70
End: 2025-04-18

## 2025-04-18 DIAGNOSIS — G47.00 INSOMNIA, UNSPECIFIED TYPE: ICD-10-CM

## 2025-04-18 DIAGNOSIS — M25.50 POLYARTHRALGIA: ICD-10-CM

## 2025-04-18 DIAGNOSIS — R76.8 RHEUMATOID FACTOR POSITIVE: Primary | ICD-10-CM

## 2025-04-18 DIAGNOSIS — F41.9 ANXIETY: ICD-10-CM

## 2025-04-18 RX ORDER — HYDROXYZINE HYDROCHLORIDE 25 MG/1
25 TABLET, FILM COATED ORAL
Qty: 30 TABLET | Refills: 3 | Status: SHIPPED | OUTPATIENT
Start: 2025-04-18

## 2025-04-30 ENCOUNTER — OFFICE VISIT (OUTPATIENT)
Dept: FAMILY MEDICINE CLINIC | Facility: CLINIC | Age: 70
End: 2025-04-30
Payer: COMMERCIAL

## 2025-04-30 VITALS
WEIGHT: 220 LBS | HEART RATE: 95 BPM | OXYGEN SATURATION: 99 % | DIASTOLIC BLOOD PRESSURE: 88 MMHG | SYSTOLIC BLOOD PRESSURE: 142 MMHG | TEMPERATURE: 98 F | BODY MASS INDEX: 38.98 KG/M2 | RESPIRATION RATE: 16 BRPM | HEIGHT: 63 IN

## 2025-04-30 DIAGNOSIS — M81.0 OSTEOPOROSIS, UNSPECIFIED OSTEOPOROSIS TYPE, UNSPECIFIED PATHOLOGICAL FRACTURE PRESENCE: ICD-10-CM

## 2025-04-30 DIAGNOSIS — M25.50 POLYARTHRALGIA: Primary | ICD-10-CM

## 2025-04-30 PROCEDURE — G2211 COMPLEX E/M VISIT ADD ON: HCPCS | Performed by: FAMILY MEDICINE

## 2025-04-30 PROCEDURE — 99214 OFFICE O/P EST MOD 30 MIN: CPT | Performed by: FAMILY MEDICINE

## 2025-04-30 RX ORDER — ALENDRONATE SODIUM 70 MG/1
70 TABLET ORAL
Qty: 12 TABLET | Refills: 3 | Status: SHIPPED | OUTPATIENT
Start: 2025-04-30

## 2025-04-30 NOTE — ASSESSMENT & PLAN NOTE
New diagnosis. Most recent DXA scan shows evidence of osteoporosis. Reviewed medication options. She is agreeable to trying fosamax.   Orders:    alendronate (Fosamax) 70 mg tablet; Take 1 tablet (70 mg total) by mouth every 7 days

## 2025-04-30 NOTE — PATIENT INSTRUCTIONS
Osteoporosis Education   Osteoporosis  is a long-term medical condition that causes your bones to become weak, brittle, and more likely to fracture. Osteoporosis occurs when your body absorbs more bone than it makes. It is also caused by a lack of calcium and estrogen (female hormone).  Common symptoms include the following:  You may not have any signs or symptoms. You may break a bone after a muscle strain, bump, or fall. A break usually occurs in the hip, spine, or wrist. A collapsed vertebra (bone in your spine) may cause severe back pain or loss of height from bent posture.  Call your doctor if:    You have severe pain.   You have increasing pain after a fall.   You have pain when you do your daily activities.   You have questions or concerns about your condition or care.  Diagnosis of osteoporosis:   Blood and urine tests  measure your calcium, vitamin D, and estrogen levels.    An x-ray or CT may show thinned bones or a fracture. You may be given contrast liquid to help the bones show up better in the pictures. Tell the healthcare provider if you have ever had an allergic reaction to contrast liquid. Do not enter the MRI room with anything metal. Metal can cause serious injury. Tell the healthcare provider if you have any metal in or on your body.    A bone density test  compares your bone thickness with what is expected for someone of your age, gender, and ethnicity.  Treatment for osteoporosis may include medicines to prevent bone loss, build new bone, and increase estrogen. These medicines help prevent fractures and may be given as a pill or injection. Ask your healthcare provider for more information on these medicines.  Prevent bone loss:  Eat healthy foods that are high in calcium.  This helps keep your bones strong. Good sources of calcium are milk, cheese, broccoli, tofu, almonds, and canned salmon and sardines. Recommended to get at least 1200mg daily of calcium.  Increase your vitamin D intake.   "Vitamin D is in fish oils, some vegetables, and fortified milk, cereal, and bread. Vitamin D is also formed in the skin when it is exposed to the sun. Ask your healthcare provider how much sunlight is safe for you. You will require at least 800 units of vitamin D daily taken as a supplement.  Drink liquids as directed.  Ask your healthcare provider how much liquid to drink each day and which liquids are best for you. Do not have alcohol or caffeine. They decrease bone mineral density, which can weaken your bones.  Exercise regularly.  Ask your healthcare provider about the best exercise plan for you. Weight bearing exercise for 30 minutes, 3 times a week can help build and strengthen bone.  Do not smoke.  Nicotine and other chemicals in cigarettes and cigars can cause lung damage. Ask your healthcare provider for information if you currently smoke and need help to quit. E-cigarettes or smokeless tobacco still contain nicotine. Talk to your healthcare provider before you use these products.  Go to physical therapy as directed.  A physical therapist teaches you exercises to help improve movement and muscle strength.  Alcohol. It is recommended to avoid heavy alcohol use as increased consumption of alcohol is known to cause bone loss  © Copyright [employer] 2021 Information is for End User's use only and may not be sold, redistributed or otherwise used for commercial purposes. All illustrations and images included in CareNotes® are the copyrighted property of [employer] or [employer]    Calcium and vitamin D for bone health (Beyond the Basics)    CALCIUM AND VITAMIN D OVERVIEW  Osteoporosis is a common bone disorder that causes a progressive loss in bone density and mass. As a result, bones become thin, weakened, and easily fractured. It is estimated that more than 1.3 million osteoporosis-associated (or \"osteoporotic\") fractures occur every year in the [place], primarily of bone within the spine " (the vertebrae), the hip, and the forearm near the wrist. =    A number of treatments can help to prevent loss of bone and treat low bone mass. However, the first step in preventing or treating osteoporosis is to consume foods and drinks that provide calcium, a mineral essential for bone strength, and vitamin D, which aids in calcium breakdown and absorption. =    CALCIUM AND VITAMIN D BENEFITS  Good nutrition is important at all ages to keep the bones healthy.    Taking calcium reduces bone loss and decreases the risk of fracturing the vertebrae (the bones that surround the spinal cord).  Consuming calcium during childhood (eg, in milk) can lead to higher bone mass in adulthood. This increase in bone density can reduce the risk of fractures later in life.  Calcium may also have benefits in other body systems by reducing blood pressure and cholesterol levels.  Calcium and vitamin D supplements may help prevent tooth loss in older adults.    RECOMMENDATIONS FOR CALCIUM    General recommendations -- Premenopausal women and men should consume at least 1000 mg of calcium, while postmenopausal women should consume 1200 mg (total diet plus supplement). You should not consume more than 2000 mg of calcium per day (total diet plus supplement) due to the risk of side effects.    Calcium in the diet -- The primary sources of calcium in the diet include milk and other dairy products, such as hard cheese, cottage cheese, or yogurt, as well as green vegetables, such as kale and broccoli (table 1). Some cereals, soy products, and fruit juices are fortified with calcium.    Calcium supplements -- The body is able to absorb calcium contained in supplements as well as from dietary sources. If it is not possible to get enough calcium from dietary sources, consult a health care provider to determine the best type, dose, and timing of calcium supplements.     Calcium carbonate is effective and is the least expensive form of calcium. It  is best absorbed with a low-iron meal (such as breakfast). Calcium carbonate may not be absorbed well in people who also take a specific medication for gastroesophageal reflux (called a proton pump inhibitor or H2 blocker), which blocks stomach acid.  Many natural calcium carbonate preparations such as oyster shells or bone meal contain some lead.  Calcium citrate is well absorbed in the fasting state as well as with a meal.  Calcium doses above 500 mg are not absorbed as well as smaller doses, so large doses of supplements should be taken in divided doses (eg, in the morning and evening).  Calcium supplements do not replace other osteoporosis treatments such as hormone replacement, bisphosphonates (eg, risedronate [sample brand name: Actonel] and alendronate [brand name: Fosamax]), and raloxifene (brand name: Evista).    Calcium and vitamin D supplements alone are usually insufficient to prevent age-related bone loss, although they may be beneficial in some subgroups (older adults, those with very low intake). In most patients with or at risk for osteoporosis, the addition of medication or hormonal therapy is necessary in order to slow the breakdown and removal of bone (ie, resorption).     Underlying gastrointestinal diseases -- Patients who do not adequately absorb nutrients from the gastrointestinal tract (due to malabsorption) may require more than 1000 to 1200 mg of calcium per day. In such cases, a health care provider can help to determine the optimal dose of calcium.    Medications -- All medications should be discussed with a health care provider to ensure that possible interactions with calcium are identified. Certain medications change the amount of calcium that is absorbed and/or excreted. As an example, loop diuretics (eg, furosemide [sample brand name: Lasix]) increase the amount of calcium excreted in the urine.    On the other hand, thiazide diuretics (eg, hydrochlorothiazide) can reduce levels of  calcium in the urine, potentially reducing the risk of bone loss and kidney stones.    Side effects of calcium -- Calcium is usually easily tolerated when it is taken in divided doses several times per day. Some people experience side effects related to calcium, including constipation and indigestion. Calcium supplements interfere with the absorption of iron and thyroid hormone, and therefore, these medications should be taken at different times.    Kidney stones -- There is no evidence that consuming large amounts of calcium (from foods and drinks) increases the risk of kidney stones, or that avoiding dietary calcium decreases the risk. In fact, avoiding dairy products is likely to increase the risk of kidney stones.    However, use of calcium supplements may increase the risk of kidney stones in susceptible individuals by raising the level of calcium in the urine. This is particularly true if the supplement is taken between meals or at bedtime, and this is one of the reasons we prefer for patients to get as much of their calcium requirement through dietary means.     IMPORTANCE OF VITAMIN D  Vitamin D decreases bone loss and lowers the risk of fracture, especially in older men and women. Along with calcium, vitamin D also helps to prevent and treat osteoporosis. To absorb calcium efficiently, an adequate amount of vitamin D must be present.    Vitamin D is normally made in the skin after exposure to sunlight.    Recommendations for vitamin D -- The current recommendation is that men over 70 years and postmenopausal women consume at least 800 international units (20 micrograms) of vitamin D per day. Lower levels of vitamin D are not as effective, while high doses can be toxic, especially if taken for long periods of time. Although the optimal intake has not been clearly established in premenopausal women or in younger men with osteoporosis, 600 international units (15 micrograms) of vitamin D daily is generally  suggested.    Vitamin D is available as an individual supplement and is included in most multivitamins and some calcium supplements (table 2). Milk is a relatively good dietary source of vitamin D, with approximately 100 international units (2.5 micrograms) per cup (240 mL), and salmon has 800 to 1000 international units (20 to 25 micrograms) of vitamin D per serving.    Most healthy people don't need to check with their health care provider before taking standard doses of vitamin D and don't need monitoring of vitamin D levels if they are not being treated for vitamin D deficiency. However, recommendations for vitamin D supplementation may be different in people with certain underlying medical conditions, such as sarcoidosis or lymphoma. In these situations, a provider will determine if supplements are needed; if so, the person's vitamin D and calcium levels should be monitored with regular tests.    ©2021 [employer] All rights reserved.  Bisphosphonate medication to treat Osteoporosis    Most people being treated for osteoporosis take these medicines first. If they do not work well enough or cause side effects that are too hard to handle, there are other options.    Bisphosphonates come in a pill or a shot. Most people take one pill every week. If your doctor prescribes a bisphosphonate pill, you must take the medicine exactly as directed. If you don't, the medicine can irritate your throat or stomach. For most bisphosphonate pills, you must:    Take the pill first thing in the morning, before you have anything to eat or drink.  Drink an 8-ounce glass of water with the pill, but not eat or drink anything else for 30 minutes or 1 hour (depending on which pill you take).  Avoid lying down for 30 minutes after taking the pill. You must sit or stand during that time.    There is one bisphosphonate pill, delayed release risedronate (brand name: Atelvia), that is taken in a different way from the others. It is taken  after breakfast with 4 ounces of water.    Lifestyle measure recommendations:  Bisphosphonate medication will help prevent bone loss and there are lifestyle measures that should also be followed. Lifestyle measures include adequate calcium and vitamin D, exercise, smoking cessation, fall prevention (physical therapy may be recommended if you are at risk for falls), and avoidance of heavy alcohol use. These measures should be adopted universally to reduce bone loss in people with osteoporosis.    Side effects:  Common side effects of oral bisphosphonate therapy can include GI side effects such as acid reflux, esophagitis, esophageal ulcers  Common side effects of intravenous (IV) bisphosphonate therapy can include flu-like symptoms. IV bisphosphonates are often associated with an acute-phase reaction within 24 to 72 hours of the infusion, characterized by low-grade fever, myalgias, and arthralgias. Treatment with antipyretic agents (ibuprofen or acetaminophen) generally improves the symptoms, and the recurrence of symptoms decreases with subsequent infusions.  Side effects shared by both oral and IV bisphosphonates include: hypocalcemia (low calcium levels in your blood), musculoskeletal pain, osteonecrosis of the jaw (rare/serious side effect), atypical femur fractures (rare)  - Osteonecrosis of the jaw (ONJ, or avascular necrosis of the jaw), is often associated with pain, swelling, exposed bone, local infection, and pathologic fracture of the jaw, is a rare complication of bisphosphonate therapy. When it occurs in a patient treated with bisphosphonates for osteoporosis, therapy should be discontinued.  - Atypical femur fractures are a rare complication of chronic (median treatment seven years) bisphosphonate therapy. Treatment of osteoporosis with bisphosphonates for up to five years is typically not associated with atypical fractures and is not a reason to defer bisphosphonate therapy in women who are at high  risk for osteoporotic fracture.    ©2021 [employer] All rights reserved.

## 2025-04-30 NOTE — PROGRESS NOTES
"Name: Casey Machado      : 1955      MRN: [id number]  Encounter Provider: Joycelyn Srivastava MD  Encounter Date: 2025   Encounter department: [facility]  :  Assessment & Plan  Polyarthralgia  Unclear etiology. She does have osteoarthritis, however given family history of RA, would like to rule out autoimmune diseases. For now continue tylenol arthritis for pain.   Orders:    ROBLES Screen w/Reflex Cascade; Future    Sedimentation rate, automated; Future    C-reactive protein; Future    Lyme Total AB W Reflex to IGM/IGG; Future    RHEUMATOID FACTOR; Future    Osteoporosis, unspecified osteoporosis type, unspecified pathological fracture presence  New diagnosis. Most recent DXA scan shows evidence of osteoporosis. Reviewed medication options. She is agreeable to trying fosamax.   Orders:    alendronate (Fosamax) 70 mg tablet; Take 1 tablet (70 mg total) by mouth every 7 days           History of Present Illness   HPI  She reports pain in multiple joints including knees, ankles, wrists. Pain is chronic, but gradually worsening.    Following ortho Dr. Rudd for knee arthritis.    Worried about rheumatoid arthritis due to family history of this.   Has been taking tylenol arthritis which helps to a degree.   Review of Systems   Constitutional: Negative.    HENT: Negative.     Eyes: Negative.    Respiratory: Negative.     Cardiovascular: Negative.    Gastrointestinal: Negative.    Endocrine: Negative.    Genitourinary: Negative.    Musculoskeletal:  Positive for arthralgias.   Skin: Negative.    Allergic/Immunologic: Negative.    Neurological: Negative.    Hematological: Negative.    Psychiatric/Behavioral: Negative.         Objective   /88   Pulse 95   Temp 98 °F (36.7 °C)   Resp 16   Ht 5' 3\" (1.6 m)   Wt 99.8 kg (220 lb)   SpO2 99%   BMI 38.97 kg/m²      Physical Exam  Constitutional:       General: She is not in acute distress.     Appearance: She is well-developed. She is not " diaphoretic.   HENT:      Head: Normocephalic and atraumatic.   Cardiovascular:      Rate and Rhythm: Normal rate and regular rhythm.      Heart sounds: Normal heart sounds. No murmur heard.     No friction rub. No gallop.   Pulmonary:      Effort: Pulmonary effort is normal. No respiratory distress.      Breath sounds: Normal breath sounds. No wheezing or rales.   Chest:      Chest wall: No tenderness.   Musculoskeletal:         General: No deformity. Normal range of motion.      Cervical back: Normal range of motion and neck supple.      Right lower leg: Edema (chronic) present.      Left lower leg: Edema (chronic) present.   Skin:     General: Skin is warm and dry.   Neurological:      Mental Status: She is alert and oriented to person, place, and time.   Psychiatric:         Behavior: Behavior normal.         Thought Content: Thought content normal.         Judgment: Judgment normal.

## 2025-05-02 ENCOUNTER — APPOINTMENT (OUTPATIENT)
Dept: LAB | Facility: CLINIC | Age: 70
End: 2025-05-02
Attending: FAMILY MEDICINE
Payer: COMMERCIAL

## 2025-05-02 DIAGNOSIS — M25.50 POLYARTHRALGIA: ICD-10-CM

## 2025-05-02 DIAGNOSIS — Z13.89 SCREENING FOR CARDIOVASCULAR, RESPIRATORY, AND GENITOURINARY DISEASES: ICD-10-CM

## 2025-05-02 DIAGNOSIS — Z11.59 NEED FOR HEPATITIS C SCREENING TEST: ICD-10-CM

## 2025-05-02 DIAGNOSIS — Z13.6 SCREENING FOR CARDIOVASCULAR, RESPIRATORY, AND GENITOURINARY DISEASES: ICD-10-CM

## 2025-05-02 DIAGNOSIS — Z13.83 SCREENING FOR CARDIOVASCULAR, RESPIRATORY, AND GENITOURINARY DISEASES: ICD-10-CM

## 2025-05-02 LAB
ALBUMIN SERPL BCG-MCNC: 4 G/DL (ref 3.5–5)
ALP SERPL-CCNC: 93 U/L (ref 34–104)
ALT SERPL W P-5'-P-CCNC: 10 U/L (ref 7–52)
ANION GAP SERPL CALCULATED.3IONS-SCNC: 8 MMOL/L (ref 4–13)
AST SERPL W P-5'-P-CCNC: 16 U/L (ref 13–39)
BILIRUB SERPL-MCNC: 0.52 MG/DL (ref 0.2–1)
BUN SERPL-MCNC: 24 MG/DL (ref 5–25)
CALCIUM SERPL-MCNC: 8.9 MG/DL (ref 8.4–10.2)
CHLORIDE SERPL-SCNC: 104 MMOL/L (ref 96–108)
CHOLEST SERPL-MCNC: 249 MG/DL (ref ?–200)
CO2 SERPL-SCNC: 27 MMOL/L (ref 21–32)
CREAT SERPL-MCNC: 0.56 MG/DL (ref 0.6–1.3)
CRP SERPL QL: 6.3 MG/L
ERYTHROCYTE [SEDIMENTATION RATE] IN BLOOD: 23 MM/HOUR (ref 0–29)
GFR SERPL CREATININE-BSD FRML MDRD: 96 ML/MIN/1.73SQ M
GLUCOSE P FAST SERPL-MCNC: 88 MG/DL (ref 65–99)
HDLC SERPL-MCNC: 107 MG/DL
LDLC SERPL CALC-MCNC: 127 MG/DL (ref 0–100)
POTASSIUM SERPL-SCNC: 4.5 MMOL/L (ref 3.5–5.3)
PROT SERPL-MCNC: 6.7 G/DL (ref 6.4–8.4)
RHEUMATOID FACT SERPL-ACNC: 22 IU/ML
SODIUM SERPL-SCNC: 139 MMOL/L (ref 135–147)
TRIGL SERPL-MCNC: 74 MG/DL (ref ?–150)

## 2025-05-02 PROCEDURE — 85652 RBC SED RATE AUTOMATED: CPT

## 2025-05-02 PROCEDURE — 86803 HEPATITIS C AB TEST: CPT

## 2025-05-02 PROCEDURE — 86618 LYME DISEASE ANTIBODY: CPT

## 2025-05-02 PROCEDURE — 80061 LIPID PANEL: CPT

## 2025-05-02 PROCEDURE — 36415 COLL VENOUS BLD VENIPUNCTURE: CPT

## 2025-05-02 PROCEDURE — 80053 COMPREHEN METABOLIC PANEL: CPT

## 2025-05-02 PROCEDURE — 86140 C-REACTIVE PROTEIN: CPT

## 2025-05-02 PROCEDURE — 86038 ANTINUCLEAR ANTIBODIES: CPT

## 2025-05-02 PROCEDURE — 86225 DNA ANTIBODY NATIVE: CPT

## 2025-05-02 PROCEDURE — 86431 RHEUMATOID FACTOR QUANT: CPT

## 2025-05-03 LAB
B BURGDOR IGG+IGM SER QL IA: NEGATIVE
HCV AB SER QL: NORMAL

## 2025-05-04 LAB
DSDNA IGG SERPL IA-ACNC: <0.9 IU/ML (ref ?–15)
NUCLEAR IGG SER IA-RTO: <0.09 RATIO (ref ?–1)

## 2025-05-06 ENCOUNTER — TELEPHONE (OUTPATIENT)
Age: 70
End: 2025-05-06

## 2025-05-06 DIAGNOSIS — I10 ESSENTIAL HYPERTENSION: ICD-10-CM

## 2025-05-06 NOTE — TELEPHONE ENCOUNTER
Patient called regarding her lab results done 5/2.  Caroline stated she saw her lab results and has questions about her autoimmune and arthritis and would like to know what PCP recommends.If any referral are required.    Please advise and contact patient  Thank you

## 2025-05-07 RX ORDER — LOSARTAN POTASSIUM 100 MG/1
100 TABLET ORAL DAILY
Qty: 90 TABLET | Refills: 1 | Status: SHIPPED | OUTPATIENT
Start: 2025-05-07

## 2025-05-07 NOTE — TELEPHONE ENCOUNTER
Called Laurie to review labs.   Referral placed for rheumatology due to elevated RF and polyarthralgia.

## 2025-05-14 DIAGNOSIS — F41.9 ANXIETY: ICD-10-CM

## 2025-05-14 RX ORDER — ESCITALOPRAM OXALATE 5 MG/1
5 TABLET ORAL DAILY
Qty: 30 TABLET | Refills: 5 | Status: SHIPPED | OUTPATIENT
Start: 2025-05-14

## 2025-05-15 ENCOUNTER — APPOINTMENT (OUTPATIENT)
Dept: RADIOLOGY | Facility: CLINIC | Age: 70
End: 2025-05-15
Attending: ORTHOPAEDIC SURGERY
Payer: COMMERCIAL

## 2025-05-15 ENCOUNTER — OFFICE VISIT (OUTPATIENT)
Dept: OBGYN CLINIC | Facility: CLINIC | Age: 70
End: 2025-05-15
Payer: COMMERCIAL

## 2025-05-15 VITALS — BODY MASS INDEX: 38.98 KG/M2 | HEIGHT: 63 IN | WEIGHT: 220 LBS

## 2025-05-15 DIAGNOSIS — G89.29 CHRONIC PAIN OF BOTH KNEES: ICD-10-CM

## 2025-05-15 DIAGNOSIS — Z98.84 S/P GASTRIC BYPASS: ICD-10-CM

## 2025-05-15 DIAGNOSIS — Z01.818 PRE-OP EXAM: ICD-10-CM

## 2025-05-15 DIAGNOSIS — M25.562 CHRONIC PAIN OF BOTH KNEES: ICD-10-CM

## 2025-05-15 DIAGNOSIS — M17.0 PRIMARY OSTEOARTHRITIS OF BOTH KNEES: ICD-10-CM

## 2025-05-15 DIAGNOSIS — M17.0 PRIMARY OSTEOARTHRITIS OF BOTH KNEES: Primary | ICD-10-CM

## 2025-05-15 DIAGNOSIS — I10 BENIGN ESSENTIAL HYPERTENSION: ICD-10-CM

## 2025-05-15 DIAGNOSIS — K91.2 POSTSURGICAL MALABSORPTION: ICD-10-CM

## 2025-05-15 DIAGNOSIS — M25.561 CHRONIC PAIN OF BOTH KNEES: ICD-10-CM

## 2025-05-15 PROCEDURE — 99215 OFFICE O/P EST HI 40 MIN: CPT | Performed by: ORTHOPAEDIC SURGERY

## 2025-05-15 PROCEDURE — 73560 X-RAY EXAM OF KNEE 1 OR 2: CPT

## 2025-05-15 PROCEDURE — 73562 X-RAY EXAM OF KNEE 3: CPT

## 2025-05-15 RX ORDER — ASCORBIC ACID 500 MG
500 TABLET ORAL 2 TIMES DAILY
Qty: 60 TABLET | Refills: 0 | Status: SHIPPED | OUTPATIENT
Start: 2025-05-15 | End: 2025-06-14

## 2025-05-15 RX ORDER — FERROUS SULFATE 324(65)MG
324 TABLET, DELAYED RELEASE (ENTERIC COATED) ORAL
Qty: 30 TABLET | Refills: 0 | Status: SHIPPED | OUTPATIENT
Start: 2025-05-15 | End: 2025-06-14

## 2025-05-15 RX ORDER — FOLIC ACID 1 MG/1
1 TABLET ORAL DAILY
Qty: 30 TABLET | Refills: 0 | Status: SHIPPED | OUTPATIENT
Start: 2025-05-15 | End: 2025-06-14

## 2025-05-15 RX ORDER — MUPIROCIN 20 MG/G
OINTMENT TOPICAL 2 TIMES DAILY
Qty: 15 G | Refills: 0 | Status: SHIPPED | OUTPATIENT
Start: 2025-05-15

## 2025-05-15 RX ORDER — ZINC SULFATE 50(220)MG
220 CAPSULE ORAL DAILY
Qty: 30 CAPSULE | Refills: 0 | Status: SHIPPED | OUTPATIENT
Start: 2025-05-15 | End: 2025-06-14

## 2025-05-15 NOTE — PROGRESS NOTES
Name: Casey Machado      : 1955      MRN: [id number]  Encounter Provider: Roman Rudd DO  Encounter Date: 5/15/2025   Encounter department: [facility]  :  Assessment & Plan  Primary osteoarthritis of both knees  Pain refractory to all conservative treatment  Discussed right RA TKA  Consented for surgery  Same-day discharge  Cemented implants  Outpatient physical therapy  PCP and cardiac clearance  Return to time of surgery  Orders:    XR knee 3 vw right non injury; Future    XR knee 1 or 2 vw left; Future    Case request operating room: ARTHROPLASTY KNEE TOTAL W ROBOT - RIGHT - SAME DAY - CEMENTED; Standing    Comprehensive metabolic panel; Future    Hemoglobin A1C W/EAG Estimation; Future    CBC and differential; Future    MRSA culture; Future    If Symptomatic, order: UA w Reflex to Microscopic w Reflex to Culture    Protime-INR; Future    APTT; Future    Ambulatory referral to Cardiology; Future    Ambulatory referral to Physical Therapy; Future    Ambulatory referral to Family Practice; Future    EKG 12 lead; Future    ascorbic acid (VITAMIN C) 500 MG tablet; Take 1 tablet (500 mg total) by mouth 2 (two) times a day    ferrous sulfate 324 (65 Fe) mg; Take 1 tablet (324 mg total) by mouth daily before breakfast    folic acid (FOLVITE) 1 mg tablet; Take 1 tablet (1 mg total) by mouth daily    mupirocin (BACTROBAN) 2 % ointment; Apply topically 2 (two) times a day    zinc sulfate (ZINCATE) 220 mg capsule; Take 1 capsule (220 mg total) by mouth daily    Cholecalciferol (VITAMIN D3) 1,000 units tablet; Take 1 tablet (1,000 Units total) by mouth daily    Chronic pain of both knees    Orders:    Case request operating room: ARTHROPLASTY KNEE TOTAL W ROBOT - RIGHT - SAME DAY - CEMENTED; Standing    Comprehensive metabolic panel; Future    Hemoglobin A1C W/EAG Estimation; Future    CBC and differential; Future    MRSA culture; Future    If Symptomatic, order: UA w Reflex to  Microscopic w Reflex to Culture    Protime-INR; Future    APTT; Future    Ambulatory referral to Cardiology; Future    Ambulatory referral to Physical Therapy; Future    Ambulatory referral to Family Practice; Future    EKG 12 lead; Future    ascorbic acid (VITAMIN C) 500 MG tablet; Take 1 tablet (500 mg total) by mouth 2 (two) times a day    ferrous sulfate 324 (65 Fe) mg; Take 1 tablet (324 mg total) by mouth daily before breakfast    folic acid (FOLVITE) 1 mg tablet; Take 1 tablet (1 mg total) by mouth daily    mupirocin (BACTROBAN) 2 % ointment; Apply topically 2 (two) times a day    zinc sulfate (ZINCATE) 220 mg capsule; Take 1 capsule (220 mg total) by mouth daily    Cholecalciferol (VITAMIN D3) 1,000 units tablet; Take 1 tablet (1,000 Units total) by mouth daily    Benign essential hypertension    Orders:    Ambulatory referral to Cardiology; Future    Postsurgical malabsorption         S/P gastric bypass         Pre-op exam    Orders:    Ambulatory referral to Cardiology; Future    Ambulatory referral to Physical Therapy; Future    Ambulatory referral to Family Practice; Future         Casey Machado is a pleasant 69 y.o. female who returns today for follow-up evaluation for her bilateral knees.  Unfortunately, the most recent corticosteroid injection administered in November was not beneficial for her.  She remains symptomatic of her end-stage underlying osteoarthritis despite activity modification, achievement of appropriate BMI, over-the-counter medications, and intra-articular injection therapy.  Based on the progression of her disease and her persistent pain, I explained that she is a good candidate for robotic assisted right total knee arthroplasty. The pre arthur and postoperative expectations were discussed here in the office today. The risks and benefits of undergoing robotic assisted right total knee arthroplasty were discussed at length and consents were signed and placed in the chart.  Please see  risk stratification and discussion below.  She will meet with my surgery scheduler today to pick a date for her procedure and make preoperative arrangements.  All of her questions and concerns were addressed today.  We will plan to see her back at time of surgery.    History of MRSA: no  History of DVT/PE: no  History of Diabetes:no  History of Malignancy: no  History of GI bleed/Peptic ulcer: no  Are you a smoker: no  Are you on medication for autoimmune disease (rheumatoid arthritis, psoriatic arthritis, lupus, etc.): no  Are you using OTC supplements: no  Clearances: PCP and Cardiology  Implants: Cemented knee  Discharge: Same-Day  PT: Outpatient  Postop Follow-up: 2 weeks    The patient was counseled in detail regarding the diagnosis, the treatment options available, the prognosis of each treatment option, the potential risks and complications.  These are, but are not limited to; deep vein thrombosis, pulmonary embolism, neurologic and vascular injury, infection, instability, leg length discrepancy, dislocation, hematoma, reflex sympathetic dystrophy, loss of range of motion, ankylosis of the knee, fracture, screw or prosthetic perforation, chronic pain, acute pain, chronic leg pain and edema, loosening, death, heart attack, and stroke.  The patient's questions were answered in detail.  The patient demonstrates understanding of these risks and wishes to proceed with surgery.    Return for At time of surgery.    I have personally reviewed pertinent imaging in PACS.  X-ray of the right knee obtained on 5/15/2025 reviewed demonstrating end-stage degenerative change in a varus pattern with bone-on-bone contact medially.  There is tricompartmental sclerosis, osteophytosis, and tibial subluxation.  There is no acute fracture, dislocation, lytic or blastic lesion.    History: Casey Machado is a 69 y.o. female who returns today for follow-up evaluation for her bilateral knees.  At today's visit, she reports that she  "received minimal relief from the most recent corticosteroid injections administered in November.  She complains of persistent activity related pain in her bilateral knees that is severe.  Her pain is more pronounced on the right over the left.  She has been working towards losing weight and would like to discuss surgery for the right knee at the end of June.  She also has an upcoming evaluation with a rheumatologist at the end of the month.  She denies any new injury or trauma.    Estimated body mass index is 38.98 kg/m² as calculated from the following:    Height as of this encounter: 5' 2.99\" (1.6 m).    Weight as of this encounter: 99.8 kg (220 lb).    Lab Results   Component Value Date    HGBA1C 5.6 06/28/2018       Social History     Occupational History    Not on file   Tobacco Use    Smoking status: Never     Passive exposure: Never    Smokeless tobacco: Never   Vaping Use    Vaping status: Never Used   Substance and Sexual Activity    Alcohol use: Yes     Comment: rare    Drug use: No    Sexual activity: Not on file       Objective:  Right Knee Exam     Muscle Strength   The patient has normal right knee strength.    Tenderness   The patient is experiencing tenderness in the lateral joint line and medial joint line.    Range of Motion   Extension:  5   Flexion:  110     Tests   Varus: negative Valgus: negative  Lachman:  Anterior - negative      Drawer:  Anterior - negative      Patellar apprehension: negative    Other   Erythema: absent  Scars: absent  Sensation: normal  Pulse: present  Swelling: none  Effusion: no effusion present    Comments:  Varus deformity passively correctable  Stable at 0, 30 and 90 degrees  Neurovascularly in tact distally  No warmth or erythema  Parapatellar crepitance noted  PF grind: positive      Left Knee Exam     Muscle Strength   The patient has normal left knee strength.    Tenderness   The patient is experiencing tenderness in the medial joint line.    Range of Motion " "  Extension:  0   Flexion:  110     Tests   Varus: negative Valgus: negative  Lachman:  Anterior - negative      Drawer:  Anterior - negative       Patellar apprehension: negative    Other   Erythema: absent  Scars: absent  Sensation: normal  Pulse: present  Swelling: none  Effusion: no effusion present    Comments:  Varus deformity passively correctable  Stable at 0, 30 and 90 degrees  Neurovascularly in tact distally  No warmth or erythema  Parapatellar crepitance noted  PF grind: positive          Observations   Left Knee   Negative for effusion.     Right Knee   Negative for effusion.       There were no vitals filed for this visit.    Subjective:  Past Medical History:   Diagnosis Date    Acute non-recurrent maxillary sinusitis 1/15/2023    Allergic rhinitis     last assessed 14, resolved 12/22/15    Anxiety     Arthritis     resolved 12/22/15    Asthma     Asthmatic bronchitis without complication 2018    Added automatically from request for surgery [id number]    Bariatric surgery status     Cancer (HCC)     derma fibroid sarcoma protuberance    Depression     \"situational\"    Environmental and seasonal allergies     \"smoke,perfume and mold some of my triggers\"    GERD (gastroesophageal reflux disease)     Hiatal hernia     History of cellulitis     Hyperlipidemia     \"a little high\"    Hypertension     Benign essential     Joint stiffness of knee     Low back pain     Lumbar disc disease     Morbid obesity (HCC)     Osteoarthritis     Postgastrectomy malabsorption     Pre-operative laboratory examination     Right knee pain     Shortness of breath     \"occas \"    Use of cane as ambulatory aid     \"for long distances\"       Past Surgical History:   Procedure Laterality Date     SECTION      last assessed 14    MOHS SURGERY Right     upper arm    UT EGD TRANSORAL BIOPSY SINGLE/MULTIPLE N/A 2018    Procedure: ESOPHAGOGASTRODUODENOSCOPY (EGD) with bx;  Surgeon: Matty Munoz MD;  " Location: [facility];  Service: Bariatrics    IA LAPS GSTR RSTCV PX W/BYP AMISH-EN-Y LIMB <150 CM N/A 8/28/2018    Procedure: LAPAROSCOPIC AMISH-EN-Y GASTRIC BYPASS AND INTRAOPERATIVE EGD;  Surgeon: Matty Munoz MD;  Location: [facility];  Service: Bariatrics       Family History   Problem Relation Age of Onset    Hypertension Mother     Heart disease Father     Cancer Neg Hx     Diabetes Neg Hx     Thyroid disease Neg Hx        Current Medications[1]    Allergies[2]    Review of Systems   Constitutional:  Positive for activity change. Negative for chills, fever and unexpected weight change.   HENT:  Negative for hearing loss, nosebleeds and sore throat.    Eyes:  Negative for pain, redness and visual disturbance.   Respiratory:  Negative for cough, shortness of breath and wheezing.    Cardiovascular:  Negative for chest pain, palpitations and leg swelling.   Gastrointestinal:  Negative for abdominal pain, nausea and vomiting.   Endocrine: Negative for polydipsia and polyuria.   Genitourinary:  Negative for dysuria and hematuria.   Musculoskeletal:  See HPI  Skin:  Negative for rash and wound.   Neurological:  Negative for dizziness, numbness and headaches.   Psychiatric/Behavioral:  Negative for decreased concentration and suicidal ideas. The patient is not nervous/anxious.      Physical Exam  Vitals and nursing note reviewed.   Constitutional:       Appearance: Normal appearance. She is well-developed.   HENT:      Head: Normocephalic and atraumatic.      Right Ear: External ear normal.      Left Ear: External ear normal.   Eyes:      General: No scleral icterus.     Extraocular Movements: Extraocular movements intact.      Conjunctiva/sclera: Conjunctivae normal.   Cardiovascular:      Rate and Rhythm: Normal rate.   Pulmonary:      Effort: Pulmonary effort is normal. No respiratory distress.   Musculoskeletal:      Cervical back: Normal range of motion and neck supple.      Comments: See Ortho exam   Skin:      General: Skin is warm and dry.   Neurological:      General: No focal deficit present.      Mental Status: She is alert and oriented to person, place, and time.   Psychiatric:         Behavior: Behavior normal.     Scribe Attestation      I,:  Gurjit Minerva am acting as a scribe while in the presence of the attending physician.:       I,:  Roman Rudd, DO personally performed the services described in this documentation    as scribed in my presence.:           This document was created using speech voice recognition software.   Grammatical errors, random word insertions, pronoun errors, and incomplete sentences are an occasional consequence of this system due to software limitations, ambient noise, and hardware issues.   Any formal questions or concerns about content, text, or information contained within the body of this dictation should be directly addressed to the provider for clarification.         [1]   Current Outpatient Medications:     acetaminophen (TYLENOL) 500 mg tablet, Take 500 mg by mouth every 6 (six) hours as needed for mild pain, Disp: , Rfl:     albuterol (PROVENTIL HFA,VENTOLIN HFA) 90 mcg/act inhaler, Inhale 2 puffs every 6 (six) hours as needed for wheezing or shortness of breath, Disp: 18 g, Rfl: 0    alendronate (Fosamax) 70 mg tablet, Take 1 tablet (70 mg total) by mouth every 7 days, Disp: 12 tablet, Rfl: 3    ascorbic acid (VITAMIN C) 500 MG tablet, Take 1 tablet (500 mg total) by mouth 2 (two) times a day, Disp: 60 tablet, Rfl: 0    azelastine (ASTELIN) 0.1 % nasal spray, 1 spray into each nostril 2 (two) times a day Use in each nostril as directed, Disp: 1 mL, Rfl: 2    Cholecalciferol (VITAMIN D) 2000 units CAPS, Take 1 capsule by mouth every evening, Disp: , Rfl:     Cholecalciferol (VITAMIN D3) 1,000 units tablet, Take 1 tablet (1,000 Units total) by mouth daily, Disp: 30 tablet, Rfl: 0    escitalopram (LEXAPRO) 5 mg tablet, TAKE ONE TABLET BY MOUTH EVERY DAY, Disp: 30 tablet,  Rfl: 5    ferrous sulfate 324 (65 Fe) mg, Take 1 tablet (324 mg total) by mouth daily before breakfast, Disp: 30 tablet, Rfl: 0    fluticasone (FLONASE) 50 mcg/act nasal spray, USE 1 SPRAY IN EACH NOSTRIL DAILY (GENERIC FOR FLONASE), Disp: 16 mL, Rfl: 0    folic acid (FOLVITE) 1 mg tablet, Take 1 tablet (1 mg total) by mouth daily, Disp: 30 tablet, Rfl: 0    hydrOXYzine HCL (ATARAX) 25 mg tablet, Take 1 tablet (25 mg total) by mouth daily at bedtime, Disp: 30 tablet, Rfl: 3    ipratropium-albuterol (DUO-NEB) 0.5-2.5 mg/3 mL nebulizer solution, Take 3 mL by nebulization every 6 (six) hours as needed for wheezing or shortness of breath, Disp: 60 mL, Rfl: 0    levocetirizine (XYZAL) 5 MG tablet, TAKE ONE TABLET BY MOUTH EVERY DAY DURING SEASONAL ALLERGY SEASON (GENERIC FOR XYZAL), Disp: 90 tablet, Rfl: 1    LORazepam (ATIVAN) 0.5 mg tablet, TAKE 1 TABLET BY MOUTH DAILY AS NEEDED FOR ANXIETY (GENERIC FOR ATIVAN), Disp: 30 tablet, Rfl: 0    losartan (COZAAR) 100 MG tablet, TAKE ONE TABLET BY MOUTH EVERY DAY, Disp: 90 tablet, Rfl: 1    Multiple Vitamins-Minerals (WOMENS MULTIVITAMIN PO), Take 1 tablet by mouth daily at bedtime, Disp: , Rfl:     mupirocin (BACTROBAN) 2 % ointment, Apply topically 2 (two) times a day, Disp: 15 g, Rfl: 0    omeprazole (PriLOSEC) 20 mg delayed release capsule, TAKE ONE CAPSULE BY MOUTH EVERY DAY (GENERIC FOR PRILOSEC), Disp: 100 capsule, Rfl: 1    zinc sulfate (ZINCATE) 220 mg capsule, Take 1 capsule (220 mg total) by mouth daily, Disp: 30 capsule, Rfl: 0  [2] No Known Allergies

## 2025-05-15 NOTE — ASSESSMENT & PLAN NOTE
Pain refractory to all conservative treatment  Discussed right RA TKA  Consented for surgery  Same-day discharge  Cemented implants  Outpatient physical therapy  PCP and cardiac clearance  Return to time of surgery  Orders:    XR knee 3 vw right non injury; Future    XR knee 1 or 2 vw left; Future    Case request operating room: ARTHROPLASTY KNEE TOTAL W ROBOT - RIGHT - SAME DAY - CEMENTED; Standing    Comprehensive metabolic panel; Future    Hemoglobin A1C W/EAG Estimation; Future    CBC and differential; Future    MRSA culture; Future    If Symptomatic, order: UA w Reflex to Microscopic w Reflex to Culture    Protime-INR; Future    APTT; Future    Ambulatory referral to Cardiology; Future    Ambulatory referral to Physical Therapy; Future    Ambulatory referral to Family Practice; Future    EKG 12 lead; Future    ascorbic acid (VITAMIN C) 500 MG tablet; Take 1 tablet (500 mg total) by mouth 2 (two) times a day    ferrous sulfate 324 (65 Fe) mg; Take 1 tablet (324 mg total) by mouth daily before breakfast    folic acid (FOLVITE) 1 mg tablet; Take 1 tablet (1 mg total) by mouth daily    mupirocin (BACTROBAN) 2 % ointment; Apply topically 2 (two) times a day    zinc sulfate (ZINCATE) 220 mg capsule; Take 1 capsule (220 mg total) by mouth daily    Cholecalciferol (VITAMIN D3) 1,000 units tablet; Take 1 tablet (1,000 Units total) by mouth daily

## 2025-05-20 ENCOUNTER — OFFICE VISIT (OUTPATIENT)
Dept: CARDIOLOGY CLINIC | Facility: CLINIC | Age: 70
End: 2025-05-20
Payer: COMMERCIAL

## 2025-05-20 VITALS
WEIGHT: 221 LBS | SYSTOLIC BLOOD PRESSURE: 136 MMHG | DIASTOLIC BLOOD PRESSURE: 90 MMHG | HEIGHT: 63 IN | HEART RATE: 81 BPM | OXYGEN SATURATION: 98 % | BODY MASS INDEX: 39.16 KG/M2

## 2025-05-20 DIAGNOSIS — Z82.49 FAMILY HISTORY OF HEART DISEASE: ICD-10-CM

## 2025-05-20 DIAGNOSIS — Z01.810 PRE-OPERATIVE CARDIOVASCULAR EXAMINATION: Primary | ICD-10-CM

## 2025-05-20 DIAGNOSIS — I10 BENIGN ESSENTIAL HYPERTENSION: ICD-10-CM

## 2025-05-20 PROCEDURE — 99203 OFFICE O/P NEW LOW 30 MIN: CPT | Performed by: INTERNAL MEDICINE

## 2025-05-20 PROCEDURE — 93000 ELECTROCARDIOGRAM COMPLETE: CPT | Performed by: INTERNAL MEDICINE

## 2025-05-20 RX ORDER — BISOPROLOL FUMARATE 5 MG/1
5 TABLET, FILM COATED ORAL DAILY
Qty: 90 TABLET | Refills: 1 | Status: SHIPPED | OUTPATIENT
Start: 2025-05-20

## 2025-05-20 NOTE — PROGRESS NOTES
[facility]  [address] [place] [address] [id number]   [place] [address]  Cardiology Consultation    Casey Machado  [id number]  1955      Consult for: Preoperative  Appreciate consult by: Joycelyn Srivastava MD    1. Pre-operative cardiovascular examination  POCT ECG      2. Benign essential hypertension  POCT ECG      3. Family history of heart disease  POCT ECG         Discussion/Summary:   Preoperative-baseline EKG normal sinus rhythm with poor R wave progression-possibly related to body habitus.  Patient with current poor functional status secondary to significant osteoarthritis.  She has a family history for coronary artery disease.  Last nuclear stress test in 2018 was negative for major inducible ischemia.  Her blood pressure is mildly elevated.  She is intermediate cardiac risk.  We will place her on low-dose beta-blocker bisoprolol 5 mg perioperative.  She will take bisoprolol even day of her procedure.  Her beta-blocker can be weaned off postsurgery.    Hypertension-diastolic blood pressure mildly elevated.  We will have her take Bystolic 5 mg in the morning.  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  She has had previous anesthesia and gastric sleeve without major cardiac complications.  She is currently limited in her activities secondary to severe osteoarthritis.  She is not able to walk up flights of stairs.  She denies having major arrhythmias.  No prior history of syncope.  No prior history of pulmonary embolism.  Denies major bleeding.    Past Medical History:   Diagnosis Date    Acute non-recurrent maxillary sinusitis 1/15/2023    Allergic rhinitis     last assessed 1/20/14, resolved 12/22/15    Anxiety     Arthritis     resolved 12/22/15    Asthma      "Asthmatic bronchitis without complication 7/19/2018    Added automatically from request for surgery [id number]    Bariatric surgery status     Cancer (HCC)     derma fibroid sarcoma protuberance    Depression     \"situational\"    Environmental and seasonal allergies     \"smoke,perfume and mold some of my triggers\"    GERD (gastroesophageal reflux disease)     Hiatal hernia     History of cellulitis     Hyperlipidemia     \"a little high\"    Hypertension     Benign essential     Joint stiffness of knee     Low back pain     Lumbar disc disease     Morbid obesity (HCC)     Osteoarthritis     Postgastrectomy malabsorption     Pre-operative laboratory examination     Right knee pain     Shortness of breath     \"occas \"    Use of cane as ambulatory aid     \"for long distances\"     Social History     Socioeconomic History    Marital status: /Civil Union     Spouse name: Not on file    Number of children: Not on file    Years of education: Not on file    Highest education level: Not on file   Occupational History    Not on file   Tobacco Use    Smoking status: Never     Passive exposure: Never    Smokeless tobacco: Never   Vaping Use    Vaping status: Never Used   Substance and Sexual Activity    Alcohol use: Yes     Comment: rare    Drug use: No    Sexual activity: Not on file   Other Topics Concern    Not on file   Social History Narrative    Not on file     Social Drivers of Health     Financial Resource Strain: Not on file   Food Insecurity: No Food Insecurity (2/18/2025)    Nursing - Inadequate Food Risk Classification     Worried About Running Out of Food in the Last Year: Never true     Ran Out of Food in the Last Year: Never true     Ran Out of Food in the Last Year: Not on file   Transportation Needs: No Transportation Needs (2/18/2025)    PRAPARE - Transportation     Lack of Transportation (Medical): No     Lack of Transportation (Non-Medical): No   Physical Activity: Not on file   Stress: Not on file   Social " "Connections: Not on file   Intimate Partner Violence: Not on file   Housing Stability: Low Risk  (2025)    Housing Stability Vital Sign     Unable to Pay for Housing in the Last Year: No     Number of Times Moved in the Last Year: 0     Homeless in the Last Year: No      Family History   Problem Relation Age of Onset    Hypertension Mother     Heart disease Father     Cancer Neg Hx     Diabetes Neg Hx     Thyroid disease Neg Hx      Past Surgical History:   Procedure Laterality Date     SECTION      last assessed 14    MOHS SURGERY Right     upper arm    OH EGD TRANSORAL BIOPSY SINGLE/MULTIPLE N/A 2018    Procedure: ESOPHAGOGASTRODUODENOSCOPY (EGD) with bx;  Surgeon: Matty Munoz MD;  Location: [facility];  Service: Bariatrics    OH LAPS GSTR RSTCV PX W/BYP AMISH-EN-Y LIMB <150 CM N/A 2018    Procedure: LAPAROSCOPIC AMISH-EN-Y GASTRIC BYPASS AND INTRAOPERATIVE EGD;  Surgeon: Matty Munoz MD;  Location: [facility];  Service: Bariatrics     Current Medications[1]  No Known Allergies  Vitals:    25 1445   BP: 136/90   BP Location: Right arm   Patient Position: Sitting   Cuff Size: Large   Pulse: 81   SpO2: 98%   Weight: 100 kg (221 lb)   Height: 5' 2.99\" (1.6 m)       Review of Systems:   Review of Systems   Constitutional:  Positive for fatigue. Negative for activity change, appetite change, chills, diaphoresis, fever and unexpected weight change.   HENT: Negative.  Negative for congestion, dental problem, drooling, ear discharge, ear pain, facial swelling, hearing loss, mouth sores, nosebleeds, postnasal drip, rhinorrhea, sinus pressure, sinus pain, sneezing, sore throat, tinnitus, trouble swallowing and voice change.    Eyes: Negative.  Negative for photophobia, pain, redness, itching and visual disturbance.   Respiratory:  Positive for shortness of breath. Negative for apnea, cough, choking, chest tightness, wheezing and stridor.    Cardiovascular: Negative.  Negative for chest " "pain, palpitations and leg swelling.   Gastrointestinal: Negative.  Negative for abdominal distention, abdominal pain, anal bleeding, blood in stool, constipation, diarrhea, nausea, rectal pain and vomiting.   Endocrine: Negative.  Negative for cold intolerance, heat intolerance, polydipsia, polyphagia and polyuria.   Genitourinary: Negative.  Negative for decreased urine volume, difficulty urinating, dyspareunia, dysuria, enuresis, flank pain, frequency, genital sores, hematuria, menstrual problem, pelvic pain, urgency, vaginal bleeding, vaginal discharge and vaginal pain.   Musculoskeletal:  Positive for arthralgias and gait problem. Negative for back pain, joint swelling, myalgias, neck pain and neck stiffness.   Skin: Negative.  Negative for color change, pallor, rash and wound.   Allergic/Immunologic: Negative.  Negative for environmental allergies, food allergies and immunocompromised state.   Neurological:  Negative for dizziness, tremors, seizures, syncope, facial asymmetry, speech difficulty, weakness, light-headedness, numbness and headaches.   Hematological: Negative.  Negative for adenopathy. Does not bruise/bleed easily.   Psychiatric/Behavioral: Negative.  Negative for agitation, behavioral problems, confusion, decreased concentration, dysphoric mood, hallucinations, self-injury, sleep disturbance and suicidal ideas. The patient is not nervous/anxious and is not hyperactive.    All other systems reviewed and are negative.      Vitals:    05/20/25 1445   BP: 136/90   BP Location: Right arm   Patient Position: Sitting   Cuff Size: Large   Pulse: 81   SpO2: 98%   Weight: 100 kg (221 lb)   Height: 5' 2.99\" (1.6 m)     Physical Examination:   Physical Exam  Constitutional:       General: She is not in acute distress.     Appearance: She is well-developed. She is not diaphoretic.   HENT:      Head: Normocephalic and atraumatic.      Right Ear: External ear normal.      Left Ear: External ear normal. "     Eyes:      General: No scleral icterus.        Right eye: No discharge.         Left eye: No discharge.      Conjunctiva/sclera: Conjunctivae normal.      Pupils: Pupils are equal, round, and reactive to light.     Neck:      Thyroid: No thyromegaly.      Vascular: No JVD.      Trachea: No tracheal deviation.     Cardiovascular:      Rate and Rhythm: Normal rate and regular rhythm.      Heart sounds: Murmur heard.      No friction rub. Gallop present.   Pulmonary:      Effort: Pulmonary effort is normal. No respiratory distress.      Breath sounds: Normal breath sounds. No stridor. No wheezing or rales.   Chest:      Chest wall: No tenderness.   Abdominal:      General: Bowel sounds are normal. There is no distension.      Palpations: Abdomen is soft. There is no mass.      Tenderness: There is no abdominal tenderness. There is no guarding or rebound.     Musculoskeletal:         General: Tenderness and signs of injury present. No deformity. Normal range of motion.      Cervical back: Normal range of motion and neck supple.     Skin:     General: Skin is warm and dry.      Coloration: Skin is not pale.      Findings: No erythema or rash.     Neurological:      Mental Status: She is alert and oriented to person, place, and time.      Cranial Nerves: No cranial nerve deficit.      Motor: No abnormal muscle tone.      Coordination: Coordination normal.      Deep Tendon Reflexes: Reflexes are normal and symmetric. Reflexes normal.     Psychiatric:         Behavior: Behavior normal.         Thought Content: Thought content normal.         Judgment: Judgment normal.         Labs:     Lab Results   Component Value Date    WBC 4.82 01/26/2024    HGB 12.8 01/26/2024    HCT 38.6 01/26/2024     (H) 01/26/2024    RDW 12.9 01/26/2024     01/26/2024     BMP:  Lab Results   Component Value Date    SODIUM 139 05/02/2025    K 4.5 05/02/2025     05/02/2025    CO2 27 05/02/2025    BUN 24 05/02/2025     "CREATININE 0.56 (L) 05/02/2025    GLUC 89 06/12/2019    GLUF 88 05/02/2025    CALCIUM 8.9 05/02/2025    EGFR 96 05/02/2025     LFT:  Lab Results   Component Value Date    AST 16 05/02/2025    ALT 10 05/02/2025    ALKPHOS 93 05/02/2025    TP 6.7 05/02/2025    ALB 4.0 05/02/2025      Lab Results   Component Value Date    RGS3MNQNMGCQ 2.216 01/26/2024     Lab Results   Component Value Date    HGBA1C 5.6 06/28/2018     Lipid Profile:   Lab Results   Component Value Date    CHOLESTEROL 249 (H) 05/02/2025     05/02/2025    LDLCALC 127 (H) 05/02/2025    TRIG 74 05/02/2025     Lab Results   Component Value Date    CHOLESTEROL 249 (H) 05/02/2025    CHOLESTEROL 257 (H) 01/26/2024     No results found for: \"CKTOTAL\", \"CKMB\", \"CKMBINDEX\", \"TROPONINI\"  No results found for: \"NTBNP\"   Recent Results (from the past 4 weeks)   Sedimentation rate, automated    Collection Time: 05/02/25  7:26 AM   Result Value Ref Range    Sed Rate 23 0 - 29 mm/hour   C-reactive protein    Collection Time: 05/02/25  7:26 AM   Result Value Ref Range    CRP 6.3 (H) <3.0 mg/L   RHEUMATOID FACTOR    Collection Time: 05/02/25  7:26 AM   Result Value Ref Range    RHEUMATOID FACTOR 22.00 (H) <=14.00 IU/mL   Comprehensive metabolic panel    Collection Time: 05/02/25  7:26 AM   Result Value Ref Range    Sodium 139 135 - 147 mmol/L    Potassium 4.5 3.5 - 5.3 mmol/L    Chloride 104 96 - 108 mmol/L    CO2 27 21 - 32 mmol/L    ANION GAP 8 4 - 13 mmol/L    BUN 24 5 - 25 mg/dL    Creatinine 0.56 (L) 0.60 - 1.30 mg/dL    Glucose, Fasting 88 65 - 99 mg/dL    Calcium 8.9 8.4 - 10.2 mg/dL    AST 16 13 - 39 U/L    ALT 10 7 - 52 U/L    Alkaline Phosphatase 93 34 - 104 U/L    Total Protein 6.7 6.4 - 8.4 g/dL    Albumin 4.0 3.5 - 5.0 g/dL    Total Bilirubin 0.52 0.20 - 1.00 mg/dL    eGFR 96 ml/min/1.73sq m   Lipid Panel with Direct LDL reflex    Collection Time: 05/02/25  7:26 AM   Result Value Ref Range    Cholesterol 249 (H) See Comment mg/dL    Triglycerides 74 " See Comment mg/dL    HDL, Direct 107 >=50 mg/dL    LDL Calculated 127 (H) 0 - 100 mg/dL   Hepatitis C Antibody    Collection Time: 25  7:26 AM   Result Value Ref Range    Hepatitis C Ab Non-reactive Non-Reactive   ROBLES Screen w/Reflex Cascade    Collection Time: 25  7:26 AM   Result Value Ref Range    ALISSA Screen <0.09 See Comment Ratio    Anti-dsDNA Ab <0.9 See Comment IU/mL   Lyme Total AB W Reflex to IGM/IGG    Collection Time: 25  7:26 AM   Result Value Ref Range    Lyme Total Antibodies Negative Negative       Imaging & Testing   I have personally reviewed pertinent reports.      Cardiac Testing   Results for orders placed during the hospital encounter of 18    Echo complete with contrast if indicated    Narrative  [place] [id number] [phone]    Transthoracic Echocardiogram  2D, M-mode, Doppler, and Color Doppler    Study date:  30-May-2018    Patient: LORENZO BUTT  MR number: [id number]  Account number: [id number]  : 1955  Age: 62 years  Gender: Female  Status: Routine  Location: Echo lab  Height: 63 in  Weight: 283.4 lb  BP: 128/ 74 mmHg    Indications: Dyspnea    Diagnoses: R06.00 - Dyspnea, unspecified    Sonographer:  JERALD Uribe  Primary Physician:  Makenzie Dinh DO  Referring Physician:  Myrna Alvarez DO  Group:  [facility]  Interpreting Physician:  Williams Aguilar MD    SUMMARY    LEFT VENTRICLE:  Systolic function was normal. Ejection fraction was estimated in the range of 50 % to 55 %.  There were no regional wall motion abnormalities.  Wall thickness was mildly increased.    MITRAL VALVE:  There was trace regurgitation.    TRICUSPID VALVE:  There was trace regurgitation.    HISTORY: PRIOR HISTORY: HTN, Obesity, Asthma, Anxiety, Depression    PROCEDURE: The procedure was performed in the echo lab. This was a routine study. The transthoracic approach was used. The study included  complete 2D imaging, M-mode, complete spectral Doppler, and color Doppler. The heart rate was 90 bpm,  at the start of the study. Image quality was adequate.    LEFT VENTRICLE: Size was normal. Systolic function was normal. Ejection fraction was estimated in the range of 50 % to 55 %. There were no regional wall motion abnormalities. Wall thickness was mildly increased. No evidence of apical  thrombus. DOPPLER: Left ventricular diastolic function parameters were normal for the patient's age.    RIGHT VENTRICLE: The size was normal. Systolic function was normal. Wall thickness was normal.    LEFT ATRIUM: Size was normal.    RIGHT ATRIUM: Size was normal.    MITRAL VALVE: Valve structure was normal. There was mild thickening. There was normal leaflet separation. DOPPLER: The transmitral velocity was within the normal range. There was no evidence for stenosis. There was trace regurgitation.    AORTIC VALVE: The valve was probably trileaflet. Leaflets exhibited mildly increased thickness and normal cuspal separation. DOPPLER: Transaortic velocity was within the normal range. There was no evidence for stenosis. There was no  significant regurgitation.    TRICUSPID VALVE: The valve structure was normal. There was normal leaflet separation. DOPPLER: The transtricuspid velocity was within the normal range. There was no evidence for stenosis. There was trace regurgitation. The tricuspid jet  envelope definition was inadequate for estimation of RV systolic pressure. There are no indirect findings (abnormal RV volume or geometry, altered pulmonary flow velocity profile, or leftward septal displacement) which would suggest  moderate or severe pulmonary hypertension.    PULMONIC VALVE: Leaflets exhibited normal thickness, no calcification, and normal cuspal separation. DOPPLER: The transpulmonic velocity was within the normal range. There was no significant regurgitation.    PERICARDIUM: There was no pericardial effusion. The  pericardium was normal in appearance.    AORTA: The root exhibited normal size.    SYSTEMIC VEINS: IVC: The inferior vena cava was normal in size.    SYSTEM MEASUREMENT TABLES    2D mode  AoR Diam 2D: 3.1 cm  LA Diam (2D): 3.4 cm  LA/Ao (2D): 1.1  FS (2D Teich): 25.3 %  IVSd (2D): 1.18 cm  LVDEV: 63.5 cm³  LVESV: 31.4 cm³  LVIDd(2D): 3.84 cm  LVISd (2D): 2.87 cm  LVPWd (2D): 1.22 cm  SV (Teich): 32.1 cm³    Apical four chamber  LVEF A4C: 53 %    Unspecified Scan Mode  MV Peak A Jerzy: 970 mm/s  MV Peak E Jerzy. Mean: 819 mm/s  MVA (PHT): 4.31 cm squared  PHT: 51 ms  Max P mm[Hg]  V Max: 2620 mm/s  Vmax: 2570 mm/s  RA Area: 15.4 cm squared  RA Volume: 37.6 cm³  TAPSE: 2.3 cm    [facility]    Prepared and electronically signed by    Williams Aguilar MD  Signed 31-May-2018 09:12:45    No results found for this or any previous visit.    No results found for this or any previous visit.    No results found for this or any previous visit.    No results found for this or any previous visit.    Results for orders placed during the hospital encounter of 18    NM myocardial perfusion spect (rx stress and/or rest)    [address] [address] [phone]    Rest/Stress Gated SPECT Myocardial Perfusion Imaging After Regadenoson    Patient: LORENZO BUTT  MR number: [id number]  Account number: [id number]  : 1955  Age: 62 years  Gender: Female  Status: Outpatient  Location: Stress lab  Height: 63 in  Weight: 284 lb  BP: 166/ 107 mmHg    Allergies: NO KNOWN ALLERGIES    Diagnosis: R06.09 - Other forms of dyspnea, Z01.818 - Encounter for other preprocedural examination    Primary Physician:  Makenzie Dinh DO  RN:  KAJAL Pitts  Referring Physician:  Myrna Alvarez DO  Group:  [facility]  Report Prepared By::  CALI PittsN  Interpreting Physician:  Williams Aguilar MD    INDICATIONS: Evaluation for  coronary artery disease.    HISTORY: The patient is a 62 year old  female. Chest pain status: no chest pain. Other symptoms: dyspnea. Coronary artery disease risk factors: hypertension and family history of premature coronary artery disease. Cardiovascular  history: none significant. Co-morbidity: history of lung disease- asthma and obesity. Medications: aspirin and an antihypertensive agent.    PHYSICAL EXAM: Baseline physical exam screening: no wheezes audible. Uses A cane with ambulation.    REST ECG: Sinus tachycardia. The ECG showed rare premature ventricular contractions.    PROCEDURE: The study was performed in the stress lab. A regadenoson infusion pharmacologic stress test was performed. Gated SPECT myocardial perfusion imaging was performed after stress and at rest. Systolic blood pressure was 166 mmHg, at  the start of the study. Diastolic blood pressure was 107 mmHg, at the start of the study. The heart rate was 116 bpm, at the start of the study. IV double checked.  Regadenoson protocol:  Time HR bpm SBP mmHg DBP mmHg Symptoms Rhythm/conduct  Baseline 09:12 116 166 107 none sinus tach, rare PVC's  Immediate 09:15 146 171 85 mild dyspnea --  1 min 09:16 127 150 77 same as above --  2 min 09:17 123 157 73 subsiding --  3 min 09:18 122 152 65 none --  Patient very anxious even after explaining the test . No medications or fluids given. The patient also performed low level exercise.    STRESS SUMMARY: Duration of pharmacologic stress was 3 min. Maximal heart rate during stress was 155 bpm. The heart rate response to stress was normal. There was resting hypertension with an appropriate blood pressure response to stress.  The rate-pressure product for the peak heart rate and blood pressure was 312[address]. There was no chest pain during stress. The stress test was terminated due to protocol completion. Pre oxygen saturation: 98 %. Peak oxygen saturation: 100 %.  1.5mm inferolateral st depression with  "exercise The stress ECG was equivocal for ischemia.    ISOTOPE ADMINISTRATION:  Resting isotope administration Stress isotope administration  Agent Tetrofosmin Tetrofosmin  Dose 16.5 mCi 46.9 mCi  Date 05/30/2018 05/30/2018    The radiopharmaceutical was injected at the peak effect of pharmacologic stress.    MYOCARDIAL PERFUSION IMAGING:  The image quality was good. Rotating projection images reveal moderate subdiaphragmatic activity.    PERFUSION DEFECTS:  -  There was a small, fixed myocardial perfusion defect of the apical wall.    GATED SPECT:  The calculated left ventricular ejection fraction was 69 %. Left ventricular ejection fraction was within normal limits by visual estimate.    SUMMARY:  -  Stress results: There was resting hypertension with an appropriate blood pressure response to stress. There was no chest pain during stress.  -  ECG conclusions: 1.5mm inferolateral st depression with exercise The stress ECG was equivocal for ischemia.  -  Perfusion imaging: There was a small, fixed myocardial perfusion defect of the apical wall.  -  Gated SPECT: The calculated left ventricular ejection fraction was 69 %. Left ventricular ejection fraction was within normal limits by visual estimate.  -  Impressions and recommendations: Likely normal study after vasodilation and low level exercise.    IMPRESSIONS: Likely normal study after vasodilation and low level exercise. Myocardial perfusion imaging was normal at rest and with stress.    Prepared and signed by    Williams Aguilar MD  Signed 05/30/2018 20:31:38      EKG: Personally reviewed.    Sinus rhythm with poor R wave progression no acute ST changes      Williams Aguilar MD [facility]  [phone]  Please call with any questions or suggestions    Counseling :  A description of the counseling:   Goals and Barriers:  Patient's ability to self care:  Medication side effect reviewed with patient in detail and all their questions answered.    \"Portions of the " "record may have been created with voice recognition software. Occasional wrong word or \"sound a like\" substitutions may have occurred due to the inherent limitations of voice recognition software. Read the chart carefully and recognize, using context, where substitutions have occurred. Please call if you have any questions. \"           [1]   Current Outpatient Medications:     acetaminophen (TYLENOL) 500 mg tablet, Take 500 mg by mouth every 6 (six) hours as needed for mild pain, Disp: , Rfl:     albuterol (PROVENTIL HFA,VENTOLIN HFA) 90 mcg/act inhaler, Inhale 2 puffs every 6 (six) hours as needed for wheezing or shortness of breath, Disp: 18 g, Rfl: 0    alendronate (Fosamax) 70 mg tablet, Take 1 tablet (70 mg total) by mouth every 7 days, Disp: 12 tablet, Rfl: 3    ascorbic acid (VITAMIN C) 500 MG tablet, Take 1 tablet (500 mg total) by mouth 2 (two) times a day, Disp: 60 tablet, Rfl: 0    azelastine (ASTELIN) 0.1 % nasal spray, 1 spray into each nostril 2 (two) times a day Use in each nostril as directed, Disp: 1 mL, Rfl: 2    Cholecalciferol (VITAMIN D) 2000 units CAPS, Take 1 capsule by mouth every evening, Disp: , Rfl:     Cholecalciferol (VITAMIN D3) 1,000 units tablet, Take 1 tablet (1,000 Units total) by mouth daily, Disp: 30 tablet, Rfl: 0    escitalopram (LEXAPRO) 5 mg tablet, TAKE ONE TABLET BY MOUTH EVERY DAY, Disp: 30 tablet, Rfl: 5    ferrous sulfate 324 (65 Fe) mg, Take 1 tablet (324 mg total) by mouth daily before breakfast, Disp: 30 tablet, Rfl: 0    fluticasone (FLONASE) 50 mcg/act nasal spray, USE 1 SPRAY IN EACH NOSTRIL DAILY (GENERIC FOR FLONASE), Disp: 16 mL, Rfl: 0    folic acid (FOLVITE) 1 mg tablet, Take 1 tablet (1 mg total) by mouth daily, Disp: 30 tablet, Rfl: 0    hydrOXYzine HCL (ATARAX) 25 mg tablet, Take 1 tablet (25 mg total) by mouth daily at bedtime, Disp: 30 tablet, Rfl: 3    ipratropium-albuterol (DUO-NEB) 0.5-2.5 mg/3 mL nebulizer solution, Take 3 mL by nebulization every 6 " (six) hours as needed for wheezing or shortness of breath, Disp: 60 mL, Rfl: 0    levocetirizine (XYZAL) 5 MG tablet, TAKE ONE TABLET BY MOUTH EVERY DAY DURING SEASONAL ALLERGY SEASON (GENERIC FOR XYZAL), Disp: 90 tablet, Rfl: 1    LORazepam (ATIVAN) 0.5 mg tablet, TAKE 1 TABLET BY MOUTH DAILY AS NEEDED FOR ANXIETY (GENERIC FOR ATIVAN), Disp: 30 tablet, Rfl: 0    losartan (COZAAR) 100 MG tablet, TAKE ONE TABLET BY MOUTH EVERY DAY, Disp: 90 tablet, Rfl: 1    Multiple Vitamins-Minerals (WOMENS MULTIVITAMIN PO), Take 1 tablet by mouth daily at bedtime, Disp: , Rfl:     mupirocin (BACTROBAN) 2 % ointment, Apply topically 2 (two) times a day, Disp: 15 g, Rfl: 0    omeprazole (PriLOSEC) 20 mg delayed release capsule, TAKE ONE CAPSULE BY MOUTH EVERY DAY (GENERIC FOR PRILOSEC), Disp: 100 capsule, Rfl: 1    zinc sulfate (ZINCATE) 220 mg capsule, Take 1 capsule (220 mg total) by mouth daily, Disp: 30 capsule, Rfl: 0

## 2025-05-23 NOTE — PROGRESS NOTES
Rheumatology Initial Outpatient Visit  Name: Casey Machado      : 1955      MRN: [id number]  Encounter Provider: Krupa Sanchez MD  Encounter Date: 2025   Encounter department: [facility]  :  Assessment & Plan  Arthralgia, unspecified joint  69-year-old female who presents for further evaluation of polyarthralgia and a low titer rheumatoid factor of 22.  Patient reports joint pain primarily affecting the right greater than left wrist, bilateral knees, and right greater than left ankle.  Pain along the right wrist is in the CMC area as well as the first compartment tendons.  There is no evidence of inflammatory arthritis on exam.   X-ray of her bilateral knees shows significant degenerative osteoarthritis of both knees.  Discussed with patient that a low titer rheumatoid factor can be seen in the absence of rheumatoid arthritis and her symptoms are not consistent with an inflammatory arthritis.  Discussed with patient that I suspect her symptoms are more related to osteoarthritis.  We will obtain x-rays of her hands and right ankle.  For completeness, I do recommend obtaining CCP antibody given the positive rheumatoid factor.  If workup most consistent with osteoarthritis, no follow-up in rheumatology will be needed.           History of Present Illness   HPI  History of Present Illness  The patient is a 69-year-old female who is referred for evaluation of joint pain.    She has been experiencing bilateral joint pain in her hands and wrists, which she manages with compression wraps. She also reports occasional twinges in her fingers and discomfort in her wrist, which she attributes to a previous injury sustained during childhood. She describes her hands and wrists as slightly swollen and experiences morning stiffness all over.  Additionally, she reports pain at the base of her right thumb, which is exacerbated by movement and activity.  She recalls a fall incident months  ago where she landed on her hand but did not sustain any fractures as she was able to move her fingers immediately after the fall. She uses  topicals to help manage her symptoms.     She also experiences pain in both ankles, with the right one being more severe. Her ankle pain is intermittent but can be severe enough to disrupt her sleep. She notes that her ankle and knee pain are exacerbated by prolonged standing or walking and are relieved by rest. She uses an over-the-counter rub from [employer] for relief and finds wrapping her ankle beneficial.    She is scheduled for a right knee replacement surgery, which she believes may be contributing to her symptoms. She recalls an incident where she stood for about 45 minutes, resulting in knee pain but no ankle discomfort.    FAMILY HISTORY  She reports a family history of arthritis.    Review of Systems  Complete ROS conducted as per HPI. In addition, denies:  Fever  Photosensitive rash  Sicca symptoms  Recurrent oral ulcers  Muscle weakness  Uveitis  Dactylitis  Chest pain  SOB  Pleurisy  Gross hematuria  Foamy urine  Raynaud's  Joint issues other than noted above      Objective   There were no vitals taken for this visit.     Physical Exam  Physical Exam  Constitutional: well appearing, no acute distress  HEENT: normocephalic, sclera clear, no visible oral or nasal ulcers  Neck: supple, no palpable cervical adenopathy  CV: regular rate and rhythm, no murmur  Pulm: normal respiratory effort, lungs clear to auscultation b/l  Skin: no rashes, no skin thickening  Extremities: warm and well perfused, no edema  MSK:  - Observation: normal  - Palpation: TTP along R CMC and 1st compartment tendons  - Synovitis: Absent  - Effusion: Absent      Labs and Imaging  I have personally reviewed pertinent labs and imaging.     RF 22  ROBLES with reflex antibody testing negative

## 2025-05-27 ENCOUNTER — TELEPHONE (OUTPATIENT)
Dept: OBGYN CLINIC | Facility: HOSPITAL | Age: 70
End: 2025-05-27

## 2025-05-27 NOTE — TELEPHONE ENCOUNTER
Preoperative Elective Admission Assessment, spoke to pt  Orthopedic Nurse Navigator: Sydney [phone]    EKG/LAB/MRSA SWAB/CXR date: 6/2-6/12    Living Situation:    Who does pt live with: spouse and daughter  What kind of home: multi-level  How do they enter the home: front  How many levels in home: 2   # of steps to enter home: 4  # of steps to second floor: 12+  Are there handrails: Yes  Are there landings: Yes  Sleeping arrangement: second floor  Where is Bathroom: first and second floor   Where is the tub or shower: second floor, walk in shower   Toilet height? Concerns for low toilets: Comfort height toilets      First Floor Setup:   Is there a bathroom: Yes, 1/2 bath   Where would pt sleep: couch     DME: DME ordered, RW 5/27/25. Instructed to bring RW on DOS.   We discussed clearing pathways in the home and making sure there is accessibly to use the walker, for example, removing throw rugs.      Patient's Current Level of Function: Ambulates: Independently and ADLs: Independent    Post-op Caregiver: spouse and daughter   Caregiver Name and phone number for Inpatient discharge needs:     Kevin Machado (Spouse)  [phone] (Mobile)     Currently receive any HHC/aides/community supports: No     Post-op Transport: spouse  To/from hospital: spouse  To/from PT 2-3x/week: spouse  Uses community transport now: No     Outpatient Physical Therapy Site:  Site: [place]   pre and post-op appts scheduled? Yes     Medication Management: self  Preferred Pharm[facility] for Post-op Medications[id number] [address] [id number]   Blood Management Vitamin Regimen: Pt statesinsurance only covered some preop vitamins, she will pick the others up OTC and start 30 days prior.   Post-op anticoagulant: to be determined by surgical team postoperatively  Has Bactroban for 5 days preop: Yes  Educated on Preoperative Bathing Instructions, and use of Soap for 5 days before surgery.      DC  Plan: Pt plans to be discharged home, discussed same day    Barriers to DC identified preoperatively: none identified    BMI: 39.16    Patient Education:  Pt educated on post-op pain, early mobilization (POD0), LOS goals, OP PT goals, and preoperative bathing. Patient educated that our goal is to appropriately discharge patient based off their post-op function while striving to maintain maximal independence. The goal is to discharge patient to home and for them to attend outpatient physical therapy.    Assigned to care team? Yes

## 2025-05-27 NOTE — TELEPHONE ENCOUNTER
Caller: patient     Doctor: Dr. Rudd     Reason for call: cb for Sydney AC    Call back#: [phone]

## 2025-05-28 LAB
DME PARACHUTE DELIVERY DATE REQUESTED: NORMAL
DME PARACHUTE ITEM DESCRIPTION: NORMAL
DME PARACHUTE ORDER STATUS: NORMAL
DME PARACHUTE SUPPLIER NAME: NORMAL
DME PARACHUTE SUPPLIER PHONE: NORMAL

## 2025-05-30 ENCOUNTER — CONSULT (OUTPATIENT)
Dept: RHEUMATOLOGY | Facility: CLINIC | Age: 70
End: 2025-05-30
Attending: FAMILY MEDICINE
Payer: COMMERCIAL

## 2025-05-30 ENCOUNTER — TELEPHONE (OUTPATIENT)
Age: 70
End: 2025-05-30

## 2025-05-30 VITALS
WEIGHT: 220 LBS | HEIGHT: 63 IN | SYSTOLIC BLOOD PRESSURE: 122 MMHG | BODY MASS INDEX: 38.98 KG/M2 | DIASTOLIC BLOOD PRESSURE: 68 MMHG | HEART RATE: 73 BPM | OXYGEN SATURATION: 98 % | TEMPERATURE: 98.3 F

## 2025-05-30 DIAGNOSIS — M25.50 ARTHRALGIA, UNSPECIFIED JOINT: Primary | ICD-10-CM

## 2025-05-30 PROCEDURE — 99204 OFFICE O/P NEW MOD 45 MIN: CPT | Performed by: STUDENT IN AN ORGANIZED HEALTH CARE EDUCATION/TRAINING PROGRAM

## 2025-05-30 NOTE — TELEPHONE ENCOUNTER
Patient is calling to confirm the address to our office as she has an appointment today at 3:45. I verified address with the patient and clear instructions on how to find us.    Thank you.

## 2025-05-30 NOTE — PATIENT INSTRUCTIONS
Your joint pain is caused by osteoarthritis (OA). This is wear-and-tear, degenerative arthritis that happens over time to most people.       What are the risk factors for OA?    Risk factors for OA include increased age, genetic predisposition, being a female over age 50, previous injury or trauma to the joint, abnormal joint mechanics (ex. flat feet, femoroacetabular impingement), smoking (for degenerative disc disease) and certain occupations. Occupations that use vibratory or pneumatic devices, (, mechanics, masons, heavy , etc) or occupations that predispose to repetitive movements (hairdressers, seamstress, painters, farmers, truck drivers, etc). For the joints that are weight bearing (low back, hips, knees, feet), being overweight can cause you develop osteoarthritis in these joints more quickly.     What is the progression of OA?    There is no way to reverse the damage of osteoarthritis once it has occurred and for some people, the arthritis will continue to progress. In the weight bearing joints, losing weight CAN help prevent progression of the arthritis. Treatment of osteoarthritis is focused on symptom management.     What are non-medication treatment options?  Weight loss: every 1 pound lost results in 4 fold reduction in load per step. If you lose 5% of your body weight, this can increase the function of your joint by 25%.  Low impact exercise to strengthen muscles and improve flexibility (swimming, biking, waking, elliptical, etc). I recommend avoiding any high impact activities such as running or jumping as this can worsen pain.   Bracing or splinting   Shoes with shock absorbing insoles  Orthotics, particularly for flat feet  Canes, walkers, or other mobility aids    What are medication treatment options?  Topical options include lidocaine, capsaicin, Voltaren (diclofenac), Blue Emu cream, cream containing hemp, CBD cream  Tylenol: 650 mg (tylenol arthritis) three times  per day or 500 mg (extra strength) two tablets three times per day. Do not take more than 3,000 mg of tylenol in 24 hours.   NSAIDs: ibuprofen, naproxen, diclofenac, meloxicam, celecoxib are useful for pain as well. You cannot take more than one NSAID medication at a time as it may harm your kidneys. Some of these may upset your stomach, so take them with food. Please make sure you discuss with you PCP if you are a candidate for these medications based on your other health conditions and other medications.   Duloxetine (Cymbalta) is an option if you do not get relief from the other options  Viscosupplementation: glucosamine sulfate 1500mg daily (caution in those on warfarin or with shellfish allergy) or chondroitin sulfate 1200mg daily; relief should be noted within 2 months of it will be effective     What are other treatment options?  Injectable options: Steroid; for knees other options include Gel (Euflexxa, Synvisc) or PRP  Joint replacement   Genicular nerve block  Acupuncture       What is a rheumatoid factor (RF)?     A rheumatoid factor is an antibody that is produced by the immune system. Based on prior research, it is suspected that the RF developed as a way to help our immune system function better in the setting of inflammation. Therefore, many conditions associated with chronic inflammation can be associated with a positive rheumatoid factor and a positive RF does not equate with a patient definitively having rheumatoid arthritis. Examples include liver disease, pulmonary disease (such as fibrosis, silicosis, asbestosis, etc), cancer (even after treatment), and infections (such as HIV and hepatitis). Also, as we age it is more likely to develop a low level rheumatoid factor as our immune system function declines. For example, 5% of individuals between 60-70 years old will have a detectable RF and up to 25% of patients over the age of 70 will have detectable RF.     In patients with rheumatoid arthritis,  "a positive RF indicates that a patient may have more severe disease and be at increased risk of involvement of other organs outside the joints. However, the level of the RF does NOT correlate with disease activity so we do not repeat or trend this level in a patient with an established diagnosis of rheumatoid arthritis.     What is a CCP?    CCP stands for cyclic citrullinated peptide. This is also an antibody produced by the immune system. Unlike the RF, CCP is much more specific for rheumatoid arthritis. A positive CCP is unlikely to be positive in any other condition other than rheumatoid arthritis. As such, currently this is one of the \"best\" lab tests we have to help diagnose rheumatoid arthritis.    "

## 2025-06-02 ENCOUNTER — OFFICE VISIT (OUTPATIENT)
Dept: URGENT CARE | Facility: CLINIC | Age: 70
End: 2025-06-02
Payer: COMMERCIAL

## 2025-06-02 VITALS
WEIGHT: 216 LBS | BODY MASS INDEX: 39.75 KG/M2 | OXYGEN SATURATION: 98 % | HEIGHT: 62 IN | SYSTOLIC BLOOD PRESSURE: 156 MMHG | HEART RATE: 96 BPM | TEMPERATURE: 97.9 F | RESPIRATION RATE: 18 BRPM | DIASTOLIC BLOOD PRESSURE: 104 MMHG

## 2025-06-02 DIAGNOSIS — J02.9 SORE THROAT: Primary | ICD-10-CM

## 2025-06-02 LAB — S PYO AG THROAT QL: NEGATIVE

## 2025-06-02 PROCEDURE — 87880 STREP A ASSAY W/OPTIC: CPT

## 2025-06-02 PROCEDURE — 87070 CULTURE OTHR SPECIMN AEROBIC: CPT

## 2025-06-02 PROCEDURE — 99213 OFFICE O/P EST LOW 20 MIN: CPT

## 2025-06-02 NOTE — PATIENT INSTRUCTIONS
Please trial warm salt water gargles, Chloraseptic spray, Cepacol cough drops and warm tea with honey as needed for sore throat.   Alternate Motrin and Tylenol as needed for pain.

## 2025-06-02 NOTE — PROGRESS NOTES
"Name: Casey Machado      : 1955      MRN: [id number]  Encounter Provider: JULIAN Mccarthy  Encounter Date: 2025   Encounter department: [facility]  :  Assessment & Plan  Sore throat  Rapid strep negative, pending throat culture result.   Please trial warm salt water gargles, Chloraseptic spray, Cepacol cough drops and warm tea with honey as needed for sore throat.   Alternate Motrin and Tylenol as needed for pain.   Orders:    POCT rapid ANTIGEN strepA    Throat culture; Future        History of Present Illness   Sore Throat   Pertinent negatives include no diarrhea or vomiting.     Casey Machado is a 69 y.o. female who presents for evaluation of sore throat.  Patient states over the past 2 days she has been experiencing sore throat associated with nausea.  She denies any fevers or chills.  She denies any vomiting or diarrhea.  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      Review of Systems   Constitutional:  Negative for chills and fever.   HENT:  Positive for sore throat.    Gastrointestinal:  Positive for nausea. Negative for diarrhea and vomiting.          Objective   BP (!) 156/104   Pulse 96   Temp 97.9 °F (36.6 °C)   Resp 18   Ht 5' 2\" (1.575 m)   Wt 98 kg (216 lb)   SpO2 98%   BMI 39.51 kg/m²      Physical Exam  Vitals and nursing note reviewed.   Constitutional:       General: She is not in acute distress.     Appearance: She is well-developed.   HENT:      Head: Normocephalic and atraumatic.      Mouth/Throat:      Pharynx: Posterior oropharyngeal erythema present.      Comments: Sore to left lateral tongue       Eyes:      Conjunctiva/sclera: Conjunctivae normal.       Cardiovascular:      Rate and Rhythm: Normal rate and regular rhythm.      Heart sounds: No murmur heard.  Pulmonary:      Effort: Pulmonary effort is normal. No respiratory distress.      Breath sounds: Normal breath sounds.   Abdominal:      " Palpations: Abdomen is soft.      Tenderness: There is no abdominal tenderness.     Musculoskeletal:         General: No swelling.      Cervical back: Neck supple.     Skin:     General: Skin is warm and dry.      Capillary Refill: Capillary refill takes less than 2 seconds.     Neurological:      Mental Status: She is alert.     Psychiatric:         Mood and Affect: Mood normal.

## 2025-06-04 ENCOUNTER — OFFICE VISIT (OUTPATIENT)
Dept: FAMILY MEDICINE CLINIC | Facility: CLINIC | Age: 70
End: 2025-06-04
Payer: COMMERCIAL

## 2025-06-04 ENCOUNTER — TELEPHONE (OUTPATIENT)
Age: 70
End: 2025-06-04

## 2025-06-04 VITALS
HEIGHT: 62 IN | TEMPERATURE: 98.9 F | HEART RATE: 88 BPM | SYSTOLIC BLOOD PRESSURE: 116 MMHG | RESPIRATION RATE: 18 BRPM | WEIGHT: 216.6 LBS | BODY MASS INDEX: 39.86 KG/M2 | DIASTOLIC BLOOD PRESSURE: 80 MMHG

## 2025-06-04 DIAGNOSIS — J02.8 ACUTE BACTERIAL PHARYNGITIS: Primary | ICD-10-CM

## 2025-06-04 DIAGNOSIS — B96.89 ACUTE BACTERIAL PHARYNGITIS: Primary | ICD-10-CM

## 2025-06-04 LAB — BACTERIA THROAT CULT: NORMAL

## 2025-06-04 PROCEDURE — G2211 COMPLEX E/M VISIT ADD ON: HCPCS | Performed by: FAMILY MEDICINE

## 2025-06-04 PROCEDURE — 99213 OFFICE O/P EST LOW 20 MIN: CPT | Performed by: FAMILY MEDICINE

## 2025-06-04 NOTE — PROGRESS NOTES
"Name: Casey Machado      : 1955      MRN: [id number]  Encounter Provider: Frank Lombardi, DO  Encounter Date: 2025   Encounter department: [facility]  :  Assessment & Plan  Acute bacterial pharyngitis    Orders:  •  amoxicillin-clavulanate (AUGMENTIN) 875-125 mg per tablet; Take 1 tablet by mouth every 12 (twelve) hours for 10 days           History of Present Illness   Pt sched for sore throat and Canker sore - 15 minute urgent care follow up  Unknown to me  Pt is having knee replacement surgery on the   Pt was tested for strep at urgent care and it came back negative          Review of Systems   HENT:  Positive for sore throat.        Objective   /80   Pulse 88   Temp 98.9 °F (37.2 °C)   Resp 18   Ht 5' 2\" (1.575 m)   Wt 98.2 kg (216 lb 9.6 oz)   BMI 39.62 kg/m²      Physical Exam  HENT:      Mouth/Throat:      Mouth: Oral lesions present.      Pharynx: Posterior oropharyngeal erythema present.   Lymphadenopathy:      Cervical: Cervical adenopathy present.         "

## 2025-06-12 ENCOUNTER — APPOINTMENT (OUTPATIENT)
Dept: LAB | Facility: HOSPITAL | Age: 70
End: 2025-06-12
Attending: ORTHOPAEDIC SURGERY
Payer: COMMERCIAL

## 2025-06-12 DIAGNOSIS — M25.562 CHRONIC PAIN OF BOTH KNEES: ICD-10-CM

## 2025-06-12 DIAGNOSIS — M25.50 ARTHRALGIA, UNSPECIFIED JOINT: ICD-10-CM

## 2025-06-12 DIAGNOSIS — M25.561 CHRONIC PAIN OF BOTH KNEES: ICD-10-CM

## 2025-06-12 DIAGNOSIS — G89.29 CHRONIC PAIN OF BOTH KNEES: ICD-10-CM

## 2025-06-12 DIAGNOSIS — M17.0 PRIMARY OSTEOARTHRITIS OF BOTH KNEES: ICD-10-CM

## 2025-06-12 LAB
ALBUMIN SERPL BCG-MCNC: 3.9 G/DL (ref 3.5–5)
ALP SERPL-CCNC: 86 U/L (ref 34–104)
ALT SERPL W P-5'-P-CCNC: 13 U/L (ref 7–52)
ANION GAP SERPL CALCULATED.3IONS-SCNC: 11 MMOL/L (ref 4–13)
APTT PPP: 30 SECONDS (ref 23–34)
AST SERPL W P-5'-P-CCNC: 16 U/L (ref 13–39)
BASOPHILS # BLD AUTO: 0.03 THOUSANDS/ÂΜL (ref 0–0.1)
BASOPHILS NFR BLD AUTO: 1 % (ref 0–1)
BILIRUB SERPL-MCNC: 0.47 MG/DL (ref 0.2–1)
BUN SERPL-MCNC: 24 MG/DL (ref 5–25)
CALCIUM SERPL-MCNC: 8.5 MG/DL (ref 8.4–10.2)
CHLORIDE SERPL-SCNC: 106 MMOL/L (ref 96–108)
CO2 SERPL-SCNC: 21 MMOL/L (ref 21–32)
CREAT SERPL-MCNC: 0.56 MG/DL (ref 0.6–1.3)
EOSINOPHIL # BLD AUTO: 0.23 THOUSAND/ÂΜL (ref 0–0.61)
EOSINOPHIL NFR BLD AUTO: 4 % (ref 0–6)
ERYTHROCYTE [DISTWIDTH] IN BLOOD BY AUTOMATED COUNT: 13.2 % (ref 11.6–15.1)
GFR SERPL CREATININE-BSD FRML MDRD: 96 ML/MIN/1.73SQ M
GLUCOSE P FAST SERPL-MCNC: 90 MG/DL (ref 65–99)
HCT VFR BLD AUTO: 38.1 % (ref 34.8–46.1)
HGB BLD-MCNC: 12.4 G/DL (ref 11.5–15.4)
IMM GRANULOCYTES # BLD AUTO: 0.02 THOUSAND/UL (ref 0–0.2)
IMM GRANULOCYTES NFR BLD AUTO: 0 % (ref 0–2)
INR PPP: 0.97 (ref 0.85–1.19)
LYMPHOCYTES # BLD AUTO: 2.19 THOUSANDS/ÂΜL (ref 0.6–4.47)
LYMPHOCYTES NFR BLD AUTO: 36 % (ref 14–44)
MCH RBC QN AUTO: 32.6 PG (ref 26.8–34.3)
MCHC RBC AUTO-ENTMCNC: 32.5 G/DL (ref 31.4–37.4)
MCV RBC AUTO: 100 FL (ref 82–98)
MONOCYTES # BLD AUTO: 0.6 THOUSAND/ÂΜL (ref 0.17–1.22)
MONOCYTES NFR BLD AUTO: 10 % (ref 4–12)
NEUTROPHILS # BLD AUTO: 3.02 THOUSANDS/ÂΜL (ref 1.85–7.62)
NEUTS SEG NFR BLD AUTO: 49 % (ref 43–75)
NRBC BLD AUTO-RTO: 0 /100 WBCS
PLATELET # BLD AUTO: 241 THOUSANDS/UL (ref 149–390)
PMV BLD AUTO: 9.4 FL (ref 8.9–12.7)
POTASSIUM SERPL-SCNC: 4.5 MMOL/L (ref 3.5–5.3)
PROT SERPL-MCNC: 6.5 G/DL (ref 6.4–8.4)
PROTHROMBIN TIME: 13.4 SECONDS (ref 12.3–15)
RBC # BLD AUTO: 3.8 MILLION/UL (ref 3.81–5.12)
SODIUM SERPL-SCNC: 138 MMOL/L (ref 135–147)
WBC # BLD AUTO: 6.09 THOUSAND/UL (ref 4.31–10.16)

## 2025-06-12 PROCEDURE — 85730 THROMBOPLASTIN TIME PARTIAL: CPT

## 2025-06-12 PROCEDURE — 87081 CULTURE SCREEN ONLY: CPT

## 2025-06-12 PROCEDURE — 86200 CCP ANTIBODY: CPT

## 2025-06-12 PROCEDURE — 80053 COMPREHEN METABOLIC PANEL: CPT

## 2025-06-12 PROCEDURE — 36415 COLL VENOUS BLD VENIPUNCTURE: CPT

## 2025-06-12 PROCEDURE — 85025 COMPLETE CBC W/AUTO DIFF WBC: CPT

## 2025-06-12 PROCEDURE — 85610 PROTHROMBIN TIME: CPT

## 2025-06-13 ENCOUNTER — HOSPITAL ENCOUNTER (OUTPATIENT)
Dept: RADIOLOGY | Facility: HOSPITAL | Age: 70
Discharge: HOME | End: 2025-06-13
Payer: COMMERCIAL

## 2025-06-13 DIAGNOSIS — M25.50 ARTHRALGIA, UNSPECIFIED JOINT: ICD-10-CM

## 2025-06-13 LAB
DME PARACHUTE DELIVERY DATE ACTUAL: NORMAL
DME PARACHUTE DELIVERY DATE REQUESTED: NORMAL
DME PARACHUTE ITEM DESCRIPTION: NORMAL
DME PARACHUTE ORDER STATUS: NORMAL
DME PARACHUTE SUPPLIER NAME: NORMAL
DME PARACHUTE SUPPLIER PHONE: NORMAL
MRSA NOSE QL CULT: NORMAL

## 2025-06-13 PROCEDURE — 73130 X-RAY EXAM OF HAND: CPT

## 2025-06-13 PROCEDURE — 73610 X-RAY EXAM OF ANKLE: CPT

## 2025-06-16 ENCOUNTER — RA CDI HCC (OUTPATIENT)
Dept: OTHER | Facility: HOSPITAL | Age: 70
End: 2025-06-16

## 2025-06-16 NOTE — PROGRESS NOTES
HCC coding opportunities     E66.01     Chart Reviewed number of suggestions sent to Provider: 1     Patients Insurance     Medicare Insurance: [employer] Advantage

## 2025-06-17 LAB — CCP AB SER IA-ACNC: 0.7 (ref ?–10)

## 2025-06-18 ENCOUNTER — RESULTS FOLLOW-UP (OUTPATIENT)
Age: 70
End: 2025-06-18

## 2025-06-19 ENCOUNTER — CONSULT (OUTPATIENT)
Dept: FAMILY MEDICINE CLINIC | Facility: CLINIC | Age: 70
End: 2025-06-19
Payer: COMMERCIAL

## 2025-06-19 VITALS
RESPIRATION RATE: 20 BRPM | WEIGHT: 218 LBS | BODY MASS INDEX: 38.62 KG/M2 | SYSTOLIC BLOOD PRESSURE: 110 MMHG | DIASTOLIC BLOOD PRESSURE: 70 MMHG | TEMPERATURE: 96 F | HEART RATE: 84 BPM | HEIGHT: 63 IN

## 2025-06-19 DIAGNOSIS — M25.562 CHRONIC PAIN OF BOTH KNEES: ICD-10-CM

## 2025-06-19 DIAGNOSIS — G89.29 CHRONIC PAIN OF BOTH KNEES: ICD-10-CM

## 2025-06-19 DIAGNOSIS — M25.561 CHRONIC PAIN OF BOTH KNEES: ICD-10-CM

## 2025-06-19 DIAGNOSIS — M17.0 PRIMARY OSTEOARTHRITIS OF BOTH KNEES: ICD-10-CM

## 2025-06-19 DIAGNOSIS — Z01.818 PRE-OP EXAM: ICD-10-CM

## 2025-06-19 DIAGNOSIS — E66.01 MORBID (SEVERE) OBESITY DUE TO EXCESS CALORIES (HCC): ICD-10-CM

## 2025-06-19 PROCEDURE — 99214 OFFICE O/P EST MOD 30 MIN: CPT | Performed by: FAMILY MEDICINE

## 2025-06-19 PROCEDURE — G2211 COMPLEX E/M VISIT ADD ON: HCPCS | Performed by: FAMILY MEDICINE

## 2025-06-19 NOTE — PROGRESS NOTES
Pre-operative Clearance  Name: Casey Machado      : 1955      MRN: [id number]  Encounter Provider: Joycelyn Srivastava MD  Encounter Date: 2025   Encounter department: [facility]    :  Assessment & Plan  Primary osteoarthritis of both knees    Orders:    Ambulatory referral to Family Practice    Chronic pain of both knees    Orders:    Ambulatory referral to Family Practice    Pre-op exam    Orders:    Ambulatory referral to Family Practice    Morbid (severe) obesity due to excess calories (HCC)               Pre-operative Clearance:     Clearance:  Patient is medically optimized (CLEARED) for proposed surgery without any additional cardiac testing.      Medication Instructions:   - Avoid herbs or non-directed vitamins one week prior to surgery    - Avoid aspirin containing medications or non-steroidal anti-inflammatory drugs one week preceding surgery    - May take tylenol for pain up until the night before surgery    - ACE Inhibitors or ARBs: Continue this medication up to the evening before surgery/procedure, but do not take the morning of the day of surgery.  - Antidepressants: Continue to take this medication on your normal schedule.  - Benzodiazepines (ie, alprazolam, lorazepam, diazepam):  If the medication is needed, continue to take it on your normal schedule.  - Beta blockers:  Continue to take this medication on your normal schedule.       History of Present Illness     Pre-Op Examination     Surgery: Right TKA   Anticipated Date of Surgery: 25   Surgeon: Dr. Rudd     Previous history of bleeding disorders or clots?: No    Previous Anesthesia reaction?: No    Prolonged steroid use in the last 6 months?: No      Assessment of Cardiac Risk:   - Unstable or severe angina or MI in the last 6 weeks or history of stent placement in the last year?: No    - Decompensated heart failure (e.g. New onset heart failure, NYHA  Class IV heart failure, or worsening existing heart failure)?:  "No    - Significant arrhythmias such as high grade AV block, symptomatic ventricular arrhythmia, newly recognized ventricular tachycardia, supraventricular tachycardia with resting heart rate >100, or symptomatic bradycardia?: No    - Severe heart valve disease including aortic stenosis or symptomatic mitral stenosis?: No      Pre-operative Risk Factors:    - History of cerebrovascular disease: No    - History of ischemic heart disease: No    - History of congestive heart failure: No    - Pre-operative treatment with insulin: No    - Pre-operative creatinine >2 mg/dL: No      Review of Systems   Constitutional: Negative.    HENT: Negative.     Eyes: Negative.    Respiratory: Negative.     Cardiovascular: Negative.    Gastrointestinal: Negative.    Endocrine: Negative.    Genitourinary: Negative.    Musculoskeletal: Negative.    Skin: Negative.    Allergic/Immunologic: Negative.    Neurological: Negative.    Hematological: Negative.    Psychiatric/Behavioral: Negative.       Past Medical History   Past Medical History[1]  Past Surgical History[2]  Family History[3]  Social History[4]  Medications[5]  No Known Allergies  Objective   /70   Pulse 84   Temp (!) 96 °F (35.6 °C)   Resp 20   Ht 5' 3\" (1.6 m)   Wt 98.9 kg (218 lb)   BMI 38.62 kg/m²     Physical Exam  Vitals and nursing note reviewed.   Constitutional:       General: She is not in acute distress.     Appearance: She is well-developed. She is not diaphoretic.   HENT:      Head: Normocephalic and atraumatic.      Right Ear: Tympanic membrane, ear canal and external ear normal. There is no impacted cerumen.      Left Ear: Tympanic membrane, ear canal and external ear normal. There is no impacted cerumen.      Nose: Nose normal.      Mouth/Throat:      Mouth: Mucous membranes are moist.     Eyes:      Conjunctiva/sclera: Conjunctivae normal.       Cardiovascular:      Rate and Rhythm: Normal rate and regular rhythm.      Heart sounds: Normal heart " "sounds. No murmur heard.     No friction rub. No gallop.   Pulmonary:      Effort: Pulmonary effort is normal. No respiratory distress.      Breath sounds: Normal breath sounds. No wheezing or rales.   Chest:      Chest wall: No tenderness.   Abdominal:      General: Abdomen is flat. There is no distension.      Palpations: Abdomen is soft. There is no mass.      Tenderness: There is no abdominal tenderness. There is no guarding or rebound.      Hernia: No hernia is present.     Musculoskeletal:         General: No deformity. Normal range of motion.      Cervical back: Normal range of motion and neck supple.     Skin:     General: Skin is warm and dry.     Neurological:      General: No focal deficit present.      Mental Status: She is alert and oriented to person, place, and time.     Psychiatric:         Behavior: Behavior normal.         Thought Content: Thought content normal.         Judgment: Judgment normal.           Joycelyn Srivastava MD         [1]   Past Medical History:  Diagnosis Date    Acute non-recurrent maxillary sinusitis 01/15/2023    Allergic rhinitis     last assessed 1/20/14, resolved 12/22/15    Anxiety     Arthritis     resolved 12/22/15    Asthma     Asthmatic bronchitis without complication 07/19/2018    Added automatically from request for surgery [id number]    Bariatric surgery status     Cancer (HCC)     derma fibroid sarcoma protuberance    Depression     \"situational\"    Environmental and seasonal allergies     \"smoke,perfume and mold some of my triggers\"    GERD (gastroesophageal reflux disease)     Hiatal hernia     pt not aware of this issue    History of cellulitis     Hyperlipidemia     \"a little high\"    Hypertension     Benign essential     Joint stiffness of knee     Low back pain     Lumbar disc disease     Morbid obesity (HCC)     Osteoarthritis     Postgastrectomy malabsorption     Pre-operative laboratory examination     Right knee pain     Shortness of breath     \"occas \"    Use of " "cane as ambulatory aid     \"for long distances\"   [2]   Past Surgical History:  Procedure Laterality Date     SECTION      last assessed 14    [facility]    CT EGD TRANSORAL BIOPSY SINGLE/MULTIPLE N/A 2018    Procedure: ESOPHAGOGASTRODUODENOSCOPY (EGD) with bx;  Surgeon: Matty Munoz MD;  Location: [facility];  Service: Bariatrics    CT LAPS GSTR RSTCV PX W/BYP AMISH-EN-Y LIMB <150 CM N/A 2018    Procedure: LAPAROSCOPIC AMISH-EN-Y GASTRIC BYPASS AND INTRAOPERATIVE EGD;  Surgeon: Matty Munoz MD;  Location: [facility];  Service: Bariatrics   [3]   Family History  Problem Relation Name Age of Onset    Hypertension Mother      Heart disease Father      Cancer Neg Hx      Diabetes Neg Hx      Thyroid disease Neg Hx     [4]   Social History  Tobacco Use    Smoking status: Never     Passive exposure: Never    Smokeless tobacco: Never   Vaping Use    Vaping status: Never Used   Substance and Sexual Activity    Alcohol use: Yes     Comment: Socially    Drug use: No   [5]   Current Outpatient Medications on File Prior to Visit   Medication Sig    acetaminophen (TYLENOL) 500 mg tablet Take 500 mg by mouth every 6 (six) hours as needed for mild pain    albuterol (PROVENTIL HFA,VENTOLIN HFA) 90 mcg/act inhaler Inhale 2 puffs every 6 (six) hours as needed for wheezing or shortness of breath    alendronate (Fosamax) 70 mg tablet Take 1 tablet (70 mg total) by mouth every 7 days    ascorbic acid (VITAMIN C) 500 MG tablet Take 1 tablet (500 mg total) by mouth 2 (two) times a day    azelastine (ASTELIN) 0.1 % nasal spray 1 spray into each nostril 2 (two) times a day Use in each nostril as directed    bisoprolol (ZEBETA) 5 mg tablet Take 1 tablet (5 mg total) by mouth in the morning.    Cholecalciferol (VITAMIN D3) 1,000 units tablet Take 1 tablet (1,000 Units total) by mouth daily    escitalopram (LEXAPRO) 5 mg tablet TAKE ONE TABLET BY MOUTH EVERY DAY    ferrous sulfate 324 (65 Fe) mg " Take 1 tablet (324 mg total) by mouth daily before breakfast    fluticasone (FLONASE) 50 mcg/act nasal spray USE 1 SPRAY IN EACH NOSTRIL DAILY (GENERIC FOR FLONASE)    folic acid (FOLVITE) 1 mg tablet Take 1 tablet (1 mg total) by mouth daily    hydrOXYzine HCL (ATARAX) 25 mg tablet Take 1 tablet (25 mg total) by mouth daily at bedtime    ipratropium-albuterol (DUO-NEB) 0.5-2.5 mg/3 mL nebulizer solution Take 3 mL by nebulization every 6 (six) hours as needed for wheezing or shortness of breath    levocetirizine (XYZAL) 5 MG tablet TAKE ONE TABLET BY MOUTH EVERY DAY DURING SEASONAL ALLERGY SEASON (GENERIC FOR XYZAL)    LORazepam (ATIVAN) 0.5 mg tablet TAKE 1 TABLET BY MOUTH DAILY AS NEEDED FOR ANXIETY (GENERIC FOR ATIVAN)    losartan (COZAAR) 100 MG tablet TAKE ONE TABLET BY MOUTH EVERY DAY    Multiple Vitamins-Minerals (WOMENS MULTIVITAMIN PO) Take 1 tablet by mouth daily at bedtime    mupirocin (BACTROBAN) 2 % ointment Apply topically 2 (two) times a day    omeprazole (PriLOSEC) 20 mg delayed release capsule TAKE ONE CAPSULE BY MOUTH EVERY DAY (GENERIC FOR PRILOSEC)    zinc sulfate (ZINCATE) 220 mg capsule Take 1 capsule (220 mg total) by mouth daily    [DISCONTINUED] Cholecalciferol (VITAMIN D) 2000 units CAPS Take 1 capsule by mouth every evening

## 2025-06-19 NOTE — H&P (VIEW-ONLY)
Pre-operative Clearance  Name: Casey Machaod      : 1955      MRN: [id number]  Encounter Provider: Joycelyn Srivastava MD  Encounter Date: 2025   Encounter department: [facility]    :  Assessment & Plan  Primary osteoarthritis of both knees    Orders:    Ambulatory referral to Family Practice    Chronic pain of both knees    Orders:    Ambulatory referral to Family Practice    Pre-op exam    Orders:    Ambulatory referral to Family Practice    Morbid (severe) obesity due to excess calories (HCC)               Pre-operative Clearance:     Clearance:  Patient is medically optimized (CLEARED) for proposed surgery without any additional cardiac testing.      Medication Instructions:   - Avoid herbs or non-directed vitamins one week prior to surgery    - Avoid aspirin containing medications or non-steroidal anti-inflammatory drugs one week preceding surgery    - May take tylenol for pain up until the night before surgery    - ACE Inhibitors or ARBs: Continue this medication up to the evening before surgery/procedure, but do not take the morning of the day of surgery.  - Antidepressants: Continue to take this medication on your normal schedule.  - Benzodiazepines (ie, alprazolam, lorazepam, diazepam):  If the medication is needed, continue to take it on your normal schedule.  - Beta blockers:  Continue to take this medication on your normal schedule.       History of Present Illness     Pre-Op Examination     Surgery: Right TKA   Anticipated Date of Surgery: 25   Surgeon: Dr. Rudd     Previous history of bleeding disorders or clots?: No    Previous Anesthesia reaction?: No    Prolonged steroid use in the last 6 months?: No      Assessment of Cardiac Risk:   - Unstable or severe angina or MI in the last 6 weeks or history of stent placement in the last year?: No    - Decompensated heart failure (e.g. New onset heart failure, NYHA  Class IV heart failure, or worsening existing heart failure)?:  "No    - Significant arrhythmias such as high grade AV block, symptomatic ventricular arrhythmia, newly recognized ventricular tachycardia, supraventricular tachycardia with resting heart rate >100, or symptomatic bradycardia?: No    - Severe heart valve disease including aortic stenosis or symptomatic mitral stenosis?: No      Pre-operative Risk Factors:    - History of cerebrovascular disease: No    - History of ischemic heart disease: No    - History of congestive heart failure: No    - Pre-operative treatment with insulin: No    - Pre-operative creatinine >2 mg/dL: No      Review of Systems   Constitutional: Negative.    HENT: Negative.     Eyes: Negative.    Respiratory: Negative.     Cardiovascular: Negative.    Gastrointestinal: Negative.    Endocrine: Negative.    Genitourinary: Negative.    Musculoskeletal: Negative.    Skin: Negative.    Allergic/Immunologic: Negative.    Neurological: Negative.    Hematological: Negative.    Psychiatric/Behavioral: Negative.       Past Medical History   Past Medical History[1]  Past Surgical History[2]  Family History[3]  Social History[4]  Medications[5]  No Known Allergies  Objective   /70   Pulse 84   Temp (!) 96 °F (35.6 °C)   Resp 20   Ht 5' 3\" (1.6 m)   Wt 98.9 kg (218 lb)   BMI 38.62 kg/m²     Physical Exam  Vitals and nursing note reviewed.   Constitutional:       General: She is not in acute distress.     Appearance: She is well-developed. She is not diaphoretic.   HENT:      Head: Normocephalic and atraumatic.      Right Ear: Tympanic membrane, ear canal and external ear normal. There is no impacted cerumen.      Left Ear: Tympanic membrane, ear canal and external ear normal. There is no impacted cerumen.      Nose: Nose normal.      Mouth/Throat:      Mouth: Mucous membranes are moist.     Eyes:      Conjunctiva/sclera: Conjunctivae normal.       Cardiovascular:      Rate and Rhythm: Normal rate and regular rhythm.      Heart sounds: Normal heart " "sounds. No murmur heard.     No friction rub. No gallop.   Pulmonary:      Effort: Pulmonary effort is normal. No respiratory distress.      Breath sounds: Normal breath sounds. No wheezing or rales.   Chest:      Chest wall: No tenderness.   Abdominal:      General: Abdomen is flat. There is no distension.      Palpations: Abdomen is soft. There is no mass.      Tenderness: There is no abdominal tenderness. There is no guarding or rebound.      Hernia: No hernia is present.     Musculoskeletal:         General: No deformity. Normal range of motion.      Cervical back: Normal range of motion and neck supple.     Skin:     General: Skin is warm and dry.     Neurological:      General: No focal deficit present.      Mental Status: She is alert and oriented to person, place, and time.     Psychiatric:         Behavior: Behavior normal.         Thought Content: Thought content normal.         Judgment: Judgment normal.           Joycelyn Srivastava MD         [1]   Past Medical History:  Diagnosis Date    Acute non-recurrent maxillary sinusitis 01/15/2023    Allergic rhinitis     last assessed 1/20/14, resolved 12/22/15    Anxiety     Arthritis     resolved 12/22/15    Asthma     Asthmatic bronchitis without complication 07/19/2018    Added automatically from request for surgery [id number]    Bariatric surgery status     Cancer (HCC)     derma fibroid sarcoma protuberance    Depression     \"situational\"    Environmental and seasonal allergies     \"smoke,perfume and mold some of my triggers\"    GERD (gastroesophageal reflux disease)     Hiatal hernia     pt not aware of this issue    History of cellulitis     Hyperlipidemia     \"a little high\"    Hypertension     Benign essential     Joint stiffness of knee     Low back pain     Lumbar disc disease     Morbid obesity (HCC)     Osteoarthritis     Postgastrectomy malabsorption     Pre-operative laboratory examination     Right knee pain     Shortness of breath     \"occas \"    Use of " "cane as ambulatory aid     \"for long distances\"   [2]   Past Surgical History:  Procedure Laterality Date     SECTION      last assessed 14    [facility] [place]    [place] EGD TRANSORAL BIOPSY SINGLE/MULTIPLE N/A 2018    Procedure: ESOPHAGOGASTRODUODENOSCOPY (EGD) with bx;  Surgeon: Matty Munoz MD;  Location: [facility];  Service: Bariatrics    RI LAPS GSTR RSTCV PX W/BYP AMISH-EN-Y LIMB <150 CM N/A 2018    Procedure: LAPAROSCOPIC AMISH-EN-Y GASTRIC BYPASS AND INTRAOPERATIVE EGD;  Surgeon: Matty Munoz MD;  Location: [facility];  Service: Bariatrics   [3]   Family History  Problem Relation Name Age of Onset    Hypertension Mother      Heart disease Father      Cancer Neg Hx      Diabetes Neg Hx      Thyroid disease Neg Hx     [4]   Social History  Tobacco Use    Smoking status: Never     Passive exposure: Never    Smokeless tobacco: Never   Vaping Use    Vaping status: Never Used   Substance and Sexual Activity    Alcohol use: Yes     Comment: Socially    Drug use: No   [5]   Current Outpatient Medications on File Prior to Visit   Medication Sig    acetaminophen (TYLENOL) 500 mg tablet Take 500 mg by mouth every 6 (six) hours as needed for mild pain    albuterol (PROVENTIL HFA,VENTOLIN HFA) 90 mcg/act inhaler Inhale 2 puffs every 6 (six) hours as needed for wheezing or shortness of breath    alendronate (Fosamax) 70 mg tablet Take 1 tablet (70 mg total) by mouth every 7 days    ascorbic acid (VITAMIN C) 500 MG tablet Take 1 tablet (500 mg total) by mouth 2 (two) times a day    azelastine (ASTELIN) 0.1 % nasal spray 1 spray into each nostril 2 (two) times a day Use in each nostril as directed    bisoprolol (ZEBETA) 5 mg tablet Take 1 tablet (5 mg total) by mouth in the morning.    Cholecalciferol (VITAMIN D3) 1,000 units tablet Take 1 tablet (1,000 Units total) by mouth daily    escitalopram (LEXAPRO) 5 mg tablet TAKE ONE TABLET BY MOUTH EVERY DAY    ferrous sulfate 324 (65 Fe) mg " Take 1 tablet (324 mg total) by mouth daily before breakfast    fluticasone (FLONASE) 50 mcg/act nasal spray USE 1 SPRAY IN EACH NOSTRIL DAILY (GENERIC FOR FLONASE)    folic acid (FOLVITE) 1 mg tablet Take 1 tablet (1 mg total) by mouth daily    hydrOXYzine HCL (ATARAX) 25 mg tablet Take 1 tablet (25 mg total) by mouth daily at bedtime    ipratropium-albuterol (DUO-NEB) 0.5-2.5 mg/3 mL nebulizer solution Take 3 mL by nebulization every 6 (six) hours as needed for wheezing or shortness of breath    levocetirizine (XYZAL) 5 MG tablet TAKE ONE TABLET BY MOUTH EVERY DAY DURING SEASONAL ALLERGY SEASON (GENERIC FOR XYZAL)    LORazepam (ATIVAN) 0.5 mg tablet TAKE 1 TABLET BY MOUTH DAILY AS NEEDED FOR ANXIETY (GENERIC FOR ATIVAN)    losartan (COZAAR) 100 MG tablet TAKE ONE TABLET BY MOUTH EVERY DAY    Multiple Vitamins-Minerals (WOMENS MULTIVITAMIN PO) Take 1 tablet by mouth daily at bedtime    mupirocin (BACTROBAN) 2 % ointment Apply topically 2 (two) times a day    omeprazole (PriLOSEC) 20 mg delayed release capsule TAKE ONE CAPSULE BY MOUTH EVERY DAY (GENERIC FOR PRILOSEC)    zinc sulfate (ZINCATE) 220 mg capsule Take 1 capsule (220 mg total) by mouth daily    [DISCONTINUED] Cholecalciferol (VITAMIN D) 2000 units CAPS Take 1 capsule by mouth every evening

## 2025-06-19 NOTE — PATIENT INSTRUCTIONS
Pre-operative Medication Instructions    Avoid herbs or non-directed vitamins one week prior to surgery  Avoid aspirin containing medications or non-steroidal anti-inflammatory drugs one week preceding surgery  May take tylenol for pain up until the night before surgery    ACE Inhibitors or ARBs     Medication Name     losartan (COZAAR) 100 MG tablet      Continue this medication up to the evening before surgery/procedure, but do not take the morning of the day of surgery.    Antidepressant Meds     Medication Name     escitalopram (LEXAPRO) 5 mg tablet      Continue to take this medication on your normal schedule.  If this is an oral medication and you take it in the morning, then you may take this medicine with a sip of water.    Benzodiazepine Antagonist     Medication Name     LORazepam (ATIVAN) 0.5 mg tablet      If this medication is needed please continue to take on your normal schedule.  If you take it in the morning, then you may take this medicine with a sip of water.    Beta Blockers     Medication Name     bisoprolol (ZEBETA) 5 mg tablet      Continue to take this heart medication on your normal schedule.  If this is an oral medication and you take it in the morning, then you may take this medicine with a sip of water.

## 2025-06-19 NOTE — PRE-PROCEDURE INSTRUCTIONS
Pre-Surgery Instructions:   Medication Instructions    acetaminophen (TYLENOL) 500 mg tablet May use day of surgery if needed      alendronate (Fosamax) 70 mg tablet Hold day of surgery.    ascorbic acid (VITAMIN C) 500 MG tablet Hold day of surgery.    azelastine (ASTELIN) 0.1 % nasal spray May use day of surgery if needed      bisoprolol (ZEBETA) 5 mg tablet Take day of surgery.    Cholecalciferol (VITAMIN D3) 1,000 units tablet Hold day of surgery.    escitalopram (LEXAPRO) 5 mg tablet Take day of surgery.    ferrous sulfate 324 (65 Fe) mg Hold day of surgery.    fluticasone (FLONASE) 50 mcg/act nasal spray May use day of surgery if needed      folic acid (FOLVITE) 1 mg tablet Hold day of surgery.    hydrOXYzine HCL (ATARAX) 25 mg tablet Take day of surgery.    levocetirizine (XYZAL) 5 MG tablet Hold day of surgery.    LORazepam (ATIVAN) 0.5 mg tablet May use day of surgery if needed      losartan (COZAAR) 100 MG tablet Hold day of surgery.    Multiple Vitamins-Minerals (WOMENS MULTIVITAMIN PO) Stop taking 7 days prior to surgery.    mupirocin (BACTROBAN) 2 % ointment Pt instructed to start 5 days before surgery including day of surgery    omeprazole (PriLOSEC) 20 mg delayed release capsule Take day of surgery.    zinc sulfate (ZINCATE) 220 mg capsule Hold day of surgery.    Medication instructions for day of surgery reviewed. Please take all instructed medications with only a sip of water. Please do not take any over the counter (non-prescribed) vitamins or supplements for one week prior to date of surgery.      You will receive a call one business day prior to surgery with an arrival time and hospital directions. If your surgery is scheduled on a Monday, the hospital will be calling you on the Friday prior to your surgery. If you have not heard from anyone by 8pm, please call the hospital supervisor through the hospital  at [phone]. ([place] [phone] or [place] [phone]).    Do not eat or  drink anything after midnight the night before your surgery, including candy, mints, lifesavers, or chewing gum. Do not drink alcohol 24hrs before your surgery. Try not to smoke at least 24hrs before your surgery.       Follow the pre surgery showering instructions as listed in the “My Surgical Experience Booklet” or otherwise provided by your surgeon's office. Do not use a blade to shave the surgical area 1 week before surgery. It is okay to use a clean electric clippers up to 24 hours before surgery. Do not apply any lotions, creams, including makeup, cologne, deodorant, or perfumes after showering on the day of your surgery. Do not use dry shampoo, hair spray, hair gel, or any type of hair products.     No contact lenses, eye make-up, or artificial eyelashes. Remove nail polish, including gel polish, and any artificial, gel, or acrylic nails if possible. Remove all jewelry including rings and body piercing jewelry.     Wear causal clothing that is easy to take on and off. Consider your type of surgery.    Keep any valuables, jewelry, piercings at home. Please bring any specially ordered equipment (sling, braces) if indicated.    Arrange for a responsible person to drive you to and from the hospital on the day of your surgery. Please confirm the visitor policy for the day of your procedure when you receive your phone call with an arrival time.     Call the surgeon's office with any new illnesses, exposures, or additional questions prior to surgery.    Please reference your “My Surgical Experience Booklet” for additional information to prepare for your upcoming surgery.

## 2025-06-22 DIAGNOSIS — E66.01 MORBID (SEVERE) OBESITY DUE TO EXCESS CALORIES (HCC): ICD-10-CM

## 2025-06-23 ENCOUNTER — EVALUATION (OUTPATIENT)
Dept: PHYSICAL THERAPY | Facility: CLINIC | Age: 70
End: 2025-06-23
Payer: COMMERCIAL

## 2025-06-23 DIAGNOSIS — M25.561 CHRONIC PAIN OF RIGHT KNEE: ICD-10-CM

## 2025-06-23 DIAGNOSIS — G89.29 CHRONIC PAIN OF RIGHT KNEE: ICD-10-CM

## 2025-06-23 DIAGNOSIS — Z01.818 PRE-OP EXAM: Primary | ICD-10-CM

## 2025-06-23 PROCEDURE — 97161 PT EVAL LOW COMPLEX 20 MIN: CPT | Performed by: PHYSICAL THERAPIST

## 2025-06-23 RX ORDER — OMEPRAZOLE 20 MG/1
CAPSULE, DELAYED RELEASE ORAL
Qty: 100 CAPSULE | Refills: 1 | OUTPATIENT
Start: 2025-06-23

## 2025-06-23 NOTE — PROGRESS NOTES
PT Evaluation     Today's date: 2025  Patient name: Casey Machado  : 1955  MRN: [id number]  Referring provider: Roman Rudd DO  Dx:   Encounter Diagnosis     ICD-10-CM    1. Pre-op exam  Z01.818 PT plan of care cert/re-cert      2. Chronic pain of right knee  M25.561 PT plan of care cert/re-cert    G89.29           Start Time: 1100  Stop Time: 1130  Total time in clinic (min): 30 minutes    Assessment  Impairments: abnormal gait, abnormal muscle tone, abnormal or restricted ROM, abnormal movement, impaired physical strength, lacks appropriate home exercise program, pain with function and poor posture   Symptom irritability: moderate    Assessment details: Discussed DOS and patient's questions were answered to patient's satisfaction. Mobility/ROM results per above. Strength results per above. Balance/Gait (including Timed Up & Go) per above. Home Assessment was reviewed with patient. Home Preparation Checklist was reviewed with patient including identification of care partner and encouragement of single level set-up. Post-operative pain management expectations discussed to the patient's satisfaction. Post-operative gait training for level ground, stairs, and car transfers was performed. Patient demonstrated competence with immediate post-operative home exercise program.    Understanding of Dx/Px/POC: good     Prognosis: good    Plan  Patient would benefit from: skilled physical therapy    Planned therapy interventions: joint mobilization, manual therapy, patient education, postural training, strengthening, stretching, therapeutic activities, therapeutic exercise, home exercise program, neuromuscular re-education, flexibility, functional ROM exercises, gait training, balance and balance/weight bearing training    Frequency: 2-3x week  Duration in weeks: 12  Treatment plan discussed with: patient      Subjective Evaluation    History of Present Illness  Mechanism of injury: Patient is presenting to  therapy pre-op for R TKA scheduled for same day. Patient reports walking and standing tolerance about 25-30 minutes. Does use a walking stick with ambulation for distance. Does have 4 steps to enter on left. Full stairs up with railing on right to second floor with bedroom. Does have recliner that goes flat. Currently performing steps with step to gait pattern. Occasional sleep disturbances reported, rest improves.. does have walker at home and has walk-in shower with bench in it.           Recurrent probem    Quality of life: good    Patient Goals  Patient goals for therapy: decreased pain, increased motion, increased strength, independence with ADLs/IADLs and return to work    Pain  Current pain rating: 3  At best pain ratin  At worst pain ratin  Quality: dull ache and throbbing  Relieving factors: ice    Social Support    Employment status: working  Treatments  Previous treatment: injection treatment      Short Term: FROM DOS  Pt will report decreased levels of pain by at least 2 subjective ratings in 4 weeks  Pt will demonstrate improved ROM by at least 10 degrees in 4 weeks  Pt will demonstrate improved strength by 1/2 grade in 4 weeks.  Pt will be able to ambulate without AD in 4 weeks  Long Term: FROM DOS  Pt will be independent in their HEP in 8 weeks  Pt will be pain free with IADL's in 8 weeks.  Pt will display 5/5 quad strength MMT in 8 weeks.  Pt will be able to perform normal stair navigation in 8 weeks     Objective    GAIT: wide FARIDA antalgic pattern  Squat assess: 25% depth  TU.9 sec without AD           MMT    Hip         R          L   Flex. 4 4                 MMT         AROM         PROM    Knee      R          L         R          L           R         L   Flex. 4+ 5 105 115     Extn. 4+ 5 -18 -3                MMT    Ankle         R          L   PF 4 4   DF. 5 5     Knee: quad activation = fair       Insurance:  AMA/CMS Eval/ Re-eval POC expires Auth #/ Referral # Total    Start  date  Expiration date Extension  Visit limitation?  PT only or  PT+OT? Co-Insurance   CMS 6.23 9.15 needed                         AUTH #:  Date 6.23              Authed: Used 1               Remaining                  Precautions: HTN, OP  Patient provided verbal consent to treatment plan and recommended interventions.    HEP: LAQ, SAQ, glute set, quad set, ankle pumps, heel slides    Manuals 6.23                                            Neuro Re-Ed                                                                        Ther Ex         Quad set         Glute set         Ankle pump         LAQ         SAQ         Heel slides                                                                        Ther Activity                           Gait Training         stairs

## 2025-06-24 DIAGNOSIS — F41.9 ANXIETY: ICD-10-CM

## 2025-06-25 ENCOUNTER — TELEPHONE (OUTPATIENT)
Age: 70
End: 2025-06-25

## 2025-06-25 DIAGNOSIS — E66.01 MORBID (SEVERE) OBESITY DUE TO EXCESS CALORIES (HCC): ICD-10-CM

## 2025-06-25 RX ORDER — LORAZEPAM 0.5 MG/1
0.5 TABLET ORAL DAILY PRN
Qty: 30 TABLET | Refills: 0 | Status: SHIPPED | OUTPATIENT
Start: 2025-06-25

## 2025-06-25 NOTE — TELEPHONE ENCOUNTER
Caller: Patient- Caroline    Doctor: Dr. Rudd    Reason for call: Patient is calling in wanting to speak with the surgery coordinator relating to her upcoming surgery with the gloria Carreno caller over to voicemail to assist further.

## 2025-06-25 NOTE — TELEPHONE ENCOUNTER
Spoke pt, she is requesting a bottle of hibiclens for her upcoming surgery.  Informed pt she can  a bottle in office.

## 2025-06-26 ENCOUNTER — TELEPHONE (OUTPATIENT)
Dept: FAMILY MEDICINE CLINIC | Facility: CLINIC | Age: 70
End: 2025-06-26

## 2025-06-26 ENCOUNTER — ANESTHESIA EVENT (OUTPATIENT)
Dept: PERIOP | Facility: HOSPITAL | Age: 70
End: 2025-06-26
Payer: COMMERCIAL

## 2025-06-26 RX ORDER — OMEPRAZOLE 20 MG/1
20 CAPSULE, DELAYED RELEASE ORAL DAILY
Qty: 100 CAPSULE | Refills: 1 | Status: SHIPPED | OUTPATIENT
Start: 2025-06-26

## 2025-06-26 NOTE — TELEPHONE ENCOUNTER
Caroline dropped off Placard Application for Dr Srivastava to complete (form in white envelope). Patient's portion is filled out. Placed in Dr Srivastava's folder. Please call patient when form is completed and ready for  [phone]

## 2025-06-29 DIAGNOSIS — J01.90 ACUTE SINUSITIS, RECURRENCE NOT SPECIFIED, UNSPECIFIED LOCATION: ICD-10-CM

## 2025-06-30 ENCOUNTER — ANESTHESIA (OUTPATIENT)
Dept: PERIOP | Facility: HOSPITAL | Age: 70
End: 2025-06-30
Payer: COMMERCIAL

## 2025-06-30 ENCOUNTER — APPOINTMENT (OUTPATIENT)
Dept: RADIOLOGY | Facility: HOSPITAL | Age: 70
End: 2025-06-30
Payer: COMMERCIAL

## 2025-06-30 ENCOUNTER — HOSPITAL ENCOUNTER (OUTPATIENT)
Facility: HOSPITAL | Age: 70
Setting detail: OUTPATIENT SURGERY
Discharge: HOME | End: 2025-06-30
Attending: ORTHOPAEDIC SURGERY | Admitting: ORTHOPAEDIC SURGERY
Payer: COMMERCIAL

## 2025-06-30 VITALS
BODY MASS INDEX: 38.79 KG/M2 | RESPIRATION RATE: 16 BRPM | DIASTOLIC BLOOD PRESSURE: 70 MMHG | TEMPERATURE: 97.9 F | HEART RATE: 71 BPM | HEIGHT: 63 IN | SYSTOLIC BLOOD PRESSURE: 145 MMHG | OXYGEN SATURATION: 97 % | WEIGHT: 218.92 LBS

## 2025-06-30 DIAGNOSIS — Z96.651 S/P TOTAL KNEE REPLACEMENT USING CEMENT, RIGHT: Primary | ICD-10-CM

## 2025-06-30 LAB
GLUCOSE SERPL-MCNC: 149 MG/DL (ref 65–140)
GLUCOSE SERPL-MCNC: 93 MG/DL (ref 65–140)

## 2025-06-30 PROCEDURE — C1713 ANCHOR/SCREW BN/BN,TIS/BN: HCPCS | Performed by: ORTHOPAEDIC SURGERY

## 2025-06-30 PROCEDURE — 97167 OT EVAL HIGH COMPLEX 60 MIN: CPT

## 2025-06-30 PROCEDURE — 73560 X-RAY EXAM OF KNEE 1 OR 2: CPT

## 2025-06-30 PROCEDURE — C1776 JOINT DEVICE (IMPLANTABLE): HCPCS | Performed by: ORTHOPAEDIC SURGERY

## 2025-06-30 PROCEDURE — S2900 ROBOTIC SURGICAL SYSTEM: HCPCS | Performed by: ORTHOPAEDIC SURGERY

## 2025-06-30 PROCEDURE — 97535 SELF CARE MNGMENT TRAINING: CPT

## 2025-06-30 PROCEDURE — 97110 THERAPEUTIC EXERCISES: CPT

## 2025-06-30 PROCEDURE — S2900 ROBOTIC SURGICAL SYSTEM: HCPCS | Performed by: PHYSICIAN ASSISTANT

## 2025-06-30 PROCEDURE — 27447 TOTAL KNEE ARTHROPLASTY: CPT | Performed by: ORTHOPAEDIC SURGERY

## 2025-06-30 PROCEDURE — 27447 TOTAL KNEE ARTHROPLASTY: CPT | Performed by: PHYSICIAN ASSISTANT

## 2025-06-30 PROCEDURE — 82948 REAGENT STRIP/BLOOD GLUCOSE: CPT

## 2025-06-30 PROCEDURE — 97163 PT EVAL HIGH COMPLEX 45 MIN: CPT

## 2025-06-30 DEVICE — ATTUNE PATELLA MEDIALIZED DOME 35MM CEMENTED AOX
Type: IMPLANTABLE DEVICE | Site: KNEE | Status: FUNCTIONAL
Brand: ATTUNE

## 2025-06-30 DEVICE — ATTUNE KNEE SYSTEM TIBIAL INSERT FIXED BEARING MEDIAL STABILIZED RIGHT AOX 6, 8MM
Type: IMPLANTABLE DEVICE | Site: KNEE | Status: FUNCTIONAL
Brand: ATTUNE

## 2025-06-30 DEVICE — ATTUNE KNEE SYSTEM FEMORAL CRUCIATE RETAINING NARROW SIZE 6N RIGHT CEMENTED
Type: IMPLANTABLE DEVICE | Site: KNEE | Status: FUNCTIONAL
Brand: ATTUNE

## 2025-06-30 DEVICE — ATTUNE KNEE SYSTEM TIBIAL BASE FIXED BEARING SIZE 6 CEMENTED
Type: IMPLANTABLE DEVICE | Site: KNEE | Status: FUNCTIONAL
Brand: ATTUNE

## 2025-06-30 DEVICE — PALACOS® R IS A FAST-CURING, RADIOPAQUE, POLY(METHYL METHACRYLATE)-BASED BONE CEMENT.PALACOS ® R CONTAINS THE X-RAY CONTRAST MEDIUM ZIRCONIUM DIOXIDE. TO IMPROVE VISIBILITY IN THE SURGICAL FIELD PALACOS ® R HAS BEEN COLOURED WITH CHLOROPHYLL (E141). THE BONE CEMENT IS PREPARED DIRECTLY BEFORE USE BY MIXING A POLYMER POWDER COMPONENT WITH A LIQUID MONOMER COMPONENT. A DUCTILE DOUGH FORMS WHICH CURES WITHIN A FEW MINUTES.
Type: IMPLANTABLE DEVICE | Site: KNEE | Status: FUNCTIONAL
Brand: PALACOS®

## 2025-06-30 RX ORDER — PROPOFOL 10 MG/ML
INJECTION, EMULSION INTRAVENOUS AS NEEDED
Status: DISCONTINUED | OUTPATIENT
Start: 2025-06-30 | End: 2025-06-30

## 2025-06-30 RX ORDER — FENTANYL CITRATE 50 UG/ML
INJECTION, SOLUTION INTRAMUSCULAR; INTRAVENOUS AS NEEDED
Status: DISCONTINUED | OUTPATIENT
Start: 2025-06-30 | End: 2025-06-30

## 2025-06-30 RX ORDER — OXYCODONE HYDROCHLORIDE 5 MG/1
10 TABLET ORAL EVERY 4 HOURS PRN
Status: DISCONTINUED | OUTPATIENT
Start: 2025-06-30 | End: 2025-07-01 | Stop reason: HOSPADM

## 2025-06-30 RX ORDER — GABAPENTIN 300 MG/1
300 CAPSULE ORAL ONCE
Status: COMPLETED | OUTPATIENT
Start: 2025-06-30 | End: 2025-06-30

## 2025-06-30 RX ORDER — MAGNESIUM HYDROXIDE 1200 MG/15ML
LIQUID ORAL AS NEEDED
Status: DISCONTINUED | OUTPATIENT
Start: 2025-06-30 | End: 2025-06-30 | Stop reason: HOSPADM

## 2025-06-30 RX ORDER — EPHEDRINE SULFATE 50 MG/ML
INJECTION INTRAVENOUS AS NEEDED
Status: DISCONTINUED | OUTPATIENT
Start: 2025-06-30 | End: 2025-06-30

## 2025-06-30 RX ORDER — LIDOCAINE HYDROCHLORIDE 10 MG/ML
INJECTION, SOLUTION EPIDURAL; INFILTRATION; INTRACAUDAL; PERINEURAL AS NEEDED
Status: DISCONTINUED | OUTPATIENT
Start: 2025-06-30 | End: 2025-06-30

## 2025-06-30 RX ORDER — ESCITALOPRAM OXALATE 5 MG/1
5 TABLET ORAL DAILY
Status: DISCONTINUED | OUTPATIENT
Start: 2025-06-30 | End: 2025-07-01 | Stop reason: HOSPADM

## 2025-06-30 RX ORDER — PANTOPRAZOLE SODIUM 40 MG/1
40 TABLET, DELAYED RELEASE ORAL DAILY
Qty: 42 TABLET | Refills: 0 | Status: SHIPPED | OUTPATIENT
Start: 2025-06-30

## 2025-06-30 RX ORDER — HYDROMORPHONE HCL/PF 1 MG/ML
0.5 SYRINGE (ML) INJECTION
Status: DISCONTINUED | OUTPATIENT
Start: 2025-06-30 | End: 2025-06-30 | Stop reason: HOSPADM

## 2025-06-30 RX ORDER — ALBUTEROL SULFATE 90 UG/1
2 INHALANT RESPIRATORY (INHALATION) EVERY 6 HOURS PRN
Status: DISCONTINUED | OUTPATIENT
Start: 2025-06-30 | End: 2025-07-01 | Stop reason: HOSPADM

## 2025-06-30 RX ORDER — TRANEXAMIC ACID 10 MG/ML
1000 INJECTION, SOLUTION INTRAVENOUS ONCE
Status: COMPLETED | OUTPATIENT
Start: 2025-06-30 | End: 2025-06-30

## 2025-06-30 RX ORDER — SODIUM CHLORIDE, SODIUM LACTATE, POTASSIUM CHLORIDE, CALCIUM CHLORIDE 600; 310; 30; 20 MG/100ML; MG/100ML; MG/100ML; MG/100ML
INJECTION, SOLUTION INTRAVENOUS CONTINUOUS PRN
Status: DISCONTINUED | OUTPATIENT
Start: 2025-06-30 | End: 2025-06-30

## 2025-06-30 RX ORDER — FENTANYL CITRATE/PF 50 MCG/ML
50 SYRINGE (ML) INJECTION
Status: DISCONTINUED | OUTPATIENT
Start: 2025-06-30 | End: 2025-06-30 | Stop reason: HOSPADM

## 2025-06-30 RX ORDER — GLYCOPYRROLATE 0.2 MG/ML
INJECTION INTRAMUSCULAR; INTRAVENOUS AS NEEDED
Status: DISCONTINUED | OUTPATIENT
Start: 2025-06-30 | End: 2025-06-30

## 2025-06-30 RX ORDER — SODIUM CHLORIDE, SODIUM LACTATE, POTASSIUM CHLORIDE, CALCIUM CHLORIDE 600; 310; 30; 20 MG/100ML; MG/100ML; MG/100ML; MG/100ML
75 INJECTION, SOLUTION INTRAVENOUS CONTINUOUS
Status: DISCONTINUED | OUTPATIENT
Start: 2025-06-30 | End: 2025-07-01 | Stop reason: HOSPADM

## 2025-06-30 RX ORDER — SODIUM CHLORIDE 9 MG/ML
INJECTION, SOLUTION INTRAVENOUS AS NEEDED
Status: DISCONTINUED | OUTPATIENT
Start: 2025-06-30 | End: 2025-06-30 | Stop reason: HOSPADM

## 2025-06-30 RX ORDER — ASPIRIN 81 MG/1
81 TABLET, CHEWABLE ORAL 2 TIMES DAILY
Qty: 84 TABLET | Refills: 0 | Status: SHIPPED | OUTPATIENT
Start: 2025-06-30 | End: 2025-08-11

## 2025-06-30 RX ORDER — ONDANSETRON 2 MG/ML
INJECTION INTRAMUSCULAR; INTRAVENOUS AS NEEDED
Status: DISCONTINUED | OUTPATIENT
Start: 2025-06-30 | End: 2025-06-30

## 2025-06-30 RX ORDER — OXYCODONE HYDROCHLORIDE 5 MG/1
TABLET ORAL
Qty: 40 TABLET | Refills: 0 | Status: SHIPPED | OUTPATIENT
Start: 2025-06-30 | End: 2025-07-07 | Stop reason: SDUPTHER

## 2025-06-30 RX ORDER — OXYCODONE HYDROCHLORIDE 5 MG/1
5 TABLET ORAL EVERY 4 HOURS PRN
Status: DISCONTINUED | OUTPATIENT
Start: 2025-06-30 | End: 2025-07-01 | Stop reason: HOSPADM

## 2025-06-30 RX ORDER — SODIUM CHLORIDE, SODIUM LACTATE, POTASSIUM CHLORIDE, CALCIUM CHLORIDE 600; 310; 30; 20 MG/100ML; MG/100ML; MG/100ML; MG/100ML
100 INJECTION, SOLUTION INTRAVENOUS CONTINUOUS
Status: CANCELLED | OUTPATIENT
Start: 2025-06-30

## 2025-06-30 RX ORDER — BUPIVACAINE HYDROCHLORIDE 5 MG/ML
INJECTION, SOLUTION EPIDURAL; INTRACAUDAL; PERINEURAL
Status: DISCONTINUED | OUTPATIENT
Start: 2025-06-30 | End: 2025-06-30

## 2025-06-30 RX ORDER — GABAPENTIN 100 MG/1
200 CAPSULE ORAL 2 TIMES DAILY
Status: DISCONTINUED | OUTPATIENT
Start: 2025-06-30 | End: 2025-07-01 | Stop reason: HOSPADM

## 2025-06-30 RX ORDER — CEFAZOLIN SODIUM 2 G/50ML
SOLUTION INTRAVENOUS AS NEEDED
Status: DISCONTINUED | OUTPATIENT
Start: 2025-06-30 | End: 2025-06-30

## 2025-06-30 RX ORDER — SODIUM CHLORIDE, SODIUM LACTATE, POTASSIUM CHLORIDE, CALCIUM CHLORIDE 600; 310; 30; 20 MG/100ML; MG/100ML; MG/100ML; MG/100ML
125 INJECTION, SOLUTION INTRAVENOUS CONTINUOUS
Status: DISCONTINUED | OUTPATIENT
Start: 2025-06-30 | End: 2025-06-30

## 2025-06-30 RX ORDER — ACETAMINOPHEN 325 MG/1
975 TABLET ORAL ONCE
Status: COMPLETED | OUTPATIENT
Start: 2025-06-30 | End: 2025-06-30

## 2025-06-30 RX ORDER — CEFAZOLIN SODIUM 2 G/50ML
2000 SOLUTION INTRAVENOUS ONCE
Status: DISCONTINUED | OUTPATIENT
Start: 2025-06-30 | End: 2025-06-30

## 2025-06-30 RX ORDER — DOCUSATE SODIUM 100 MG/1
100 CAPSULE, LIQUID FILLED ORAL 2 TIMES DAILY
Status: DISCONTINUED | OUTPATIENT
Start: 2025-06-30 | End: 2025-07-01 | Stop reason: HOSPADM

## 2025-06-30 RX ORDER — CEFAZOLIN SODIUM 2 G/50ML
2000 SOLUTION INTRAVENOUS EVERY 6 HOURS
Status: COMPLETED | OUTPATIENT
Start: 2025-06-30 | End: 2025-06-30

## 2025-06-30 RX ORDER — HYDROXYZINE HYDROCHLORIDE 25 MG/1
25 TABLET, FILM COATED ORAL
Status: DISCONTINUED | OUTPATIENT
Start: 2025-06-30 | End: 2025-07-01 | Stop reason: HOSPADM

## 2025-06-30 RX ORDER — MIDAZOLAM HYDROCHLORIDE 2 MG/2ML
INJECTION, SOLUTION INTRAMUSCULAR; INTRAVENOUS AS NEEDED
Status: DISCONTINUED | OUTPATIENT
Start: 2025-06-30 | End: 2025-06-30

## 2025-06-30 RX ORDER — PANTOPRAZOLE SODIUM 40 MG/1
40 TABLET, DELAYED RELEASE ORAL DAILY
Status: DISCONTINUED | OUTPATIENT
Start: 2025-06-30 | End: 2025-07-01 | Stop reason: HOSPADM

## 2025-06-30 RX ORDER — PROPOFOL 10 MG/ML
INJECTION, EMULSION INTRAVENOUS CONTINUOUS PRN
Status: DISCONTINUED | OUTPATIENT
Start: 2025-06-30 | End: 2025-06-30

## 2025-06-30 RX ORDER — ONDANSETRON 2 MG/ML
4 INJECTION INTRAMUSCULAR; INTRAVENOUS EVERY 6 HOURS PRN
Status: DISCONTINUED | OUTPATIENT
Start: 2025-06-30 | End: 2025-07-01 | Stop reason: HOSPADM

## 2025-06-30 RX ORDER — BUPIVACAINE HYDROCHLORIDE 2.5 MG/ML
INJECTION, SOLUTION EPIDURAL; INFILTRATION; INTRACAUDAL; PERINEURAL AS NEEDED
Status: DISCONTINUED | OUTPATIENT
Start: 2025-06-30 | End: 2025-06-30 | Stop reason: HOSPADM

## 2025-06-30 RX ORDER — CHLORHEXIDINE GLUCONATE ORAL RINSE 1.2 MG/ML
15 SOLUTION DENTAL ONCE
Status: COMPLETED | OUTPATIENT
Start: 2025-06-30 | End: 2025-06-30

## 2025-06-30 RX ORDER — HYDROMORPHONE HCL/PF 1 MG/ML
0.5 SYRINGE (ML) INJECTION EVERY 2 HOUR PRN
Status: DISCONTINUED | OUTPATIENT
Start: 2025-06-30 | End: 2025-07-01 | Stop reason: HOSPADM

## 2025-06-30 RX ORDER — BISOPROLOL FUMARATE 5 MG/1
5 TABLET, FILM COATED ORAL DAILY
Status: DISCONTINUED | OUTPATIENT
Start: 2025-07-01 | End: 2025-07-01 | Stop reason: HOSPADM

## 2025-06-30 RX ORDER — KETAMINE HCL IN NACL, ISO-OSM 100MG/10ML
SYRINGE (ML) INJECTION AS NEEDED
Status: DISCONTINUED | OUTPATIENT
Start: 2025-06-30 | End: 2025-06-30

## 2025-06-30 RX ORDER — FLUTICASONE PROPIONATE 50 MCG
1 SPRAY, SUSPENSION (ML) NASAL DAILY
Status: DISCONTINUED | OUTPATIENT
Start: 2025-07-01 | End: 2025-07-01 | Stop reason: HOSPADM

## 2025-06-30 RX ORDER — OXYCODONE HCL 10 MG/1
10 TABLET, FILM COATED, EXTENDED RELEASE ORAL ONCE
Status: COMPLETED | OUTPATIENT
Start: 2025-06-30 | End: 2025-06-30

## 2025-06-30 RX ORDER — ACETAMINOPHEN 325 MG/1
975 TABLET ORAL EVERY 8 HOURS
Status: DISCONTINUED | OUTPATIENT
Start: 2025-06-30 | End: 2025-07-01 | Stop reason: HOSPADM

## 2025-06-30 RX ORDER — ASPIRIN 81 MG/1
81 TABLET, CHEWABLE ORAL 2 TIMES DAILY
Status: DISCONTINUED | OUTPATIENT
Start: 2025-06-30 | End: 2025-07-01 | Stop reason: HOSPADM

## 2025-06-30 RX ORDER — DEXAMETHASONE SODIUM PHOSPHATE 10 MG/ML
INJECTION, SOLUTION INTRAMUSCULAR; INTRAVENOUS AS NEEDED
Status: DISCONTINUED | OUTPATIENT
Start: 2025-06-30 | End: 2025-06-30

## 2025-06-30 RX ORDER — IPRATROPIUM BROMIDE AND ALBUTEROL SULFATE 2.5; .5 MG/3ML; MG/3ML
3 SOLUTION RESPIRATORY (INHALATION) EVERY 6 HOURS PRN
Status: DISCONTINUED | OUTPATIENT
Start: 2025-06-30 | End: 2025-07-01 | Stop reason: HOSPADM

## 2025-06-30 RX ORDER — ACETAMINOPHEN 325 MG/1
975 TABLET ORAL EVERY 8 HOURS
Start: 2025-06-30

## 2025-06-30 RX ORDER — DOCUSATE SODIUM 100 MG/1
100 CAPSULE, LIQUID FILLED ORAL 2 TIMES DAILY
Qty: 60 CAPSULE | Refills: 0 | Status: SHIPPED | OUTPATIENT
Start: 2025-06-30

## 2025-06-30 RX ORDER — MAGNESIUM HYDROXIDE/ALUMINUM HYDROXICE/SIMETHICONE 120; 1200; 1200 MG/30ML; MG/30ML; MG/30ML
30 SUSPENSION ORAL EVERY 6 HOURS PRN
Status: DISCONTINUED | OUTPATIENT
Start: 2025-06-30 | End: 2025-07-01 | Stop reason: HOSPADM

## 2025-06-30 RX ORDER — ONDANSETRON 2 MG/ML
4 INJECTION INTRAMUSCULAR; INTRAVENOUS ONCE AS NEEDED
Status: DISCONTINUED | OUTPATIENT
Start: 2025-06-30 | End: 2025-06-30 | Stop reason: HOSPADM

## 2025-06-30 RX ORDER — BUPIVACAINE HYDROCHLORIDE 7.5 MG/ML
INJECTION, SOLUTION INTRASPINAL AS NEEDED
Status: DISCONTINUED | OUTPATIENT
Start: 2025-06-30 | End: 2025-06-30

## 2025-06-30 RX ADMIN — CEFAZOLIN SODIUM 2000 MG: 2 SOLUTION INTRAVENOUS at 13:33

## 2025-06-30 RX ADMIN — Medication 30 MG: at 07:55

## 2025-06-30 RX ADMIN — OXYCODONE HYDROCHLORIDE 10 MG: 5 TABLET ORAL at 12:37

## 2025-06-30 RX ADMIN — OXYCODONE HYDROCHLORIDE 10 MG: 10 TABLET, FILM COATED, EXTENDED RELEASE ORAL at 06:50

## 2025-06-30 RX ADMIN — PROPOFOL 20 MG: 10 INJECTION, EMULSION INTRAVENOUS at 08:12

## 2025-06-30 RX ADMIN — TRANEXAMIC ACID 1000 MG: 10 INJECTION, SOLUTION INTRAVENOUS at 07:50

## 2025-06-30 RX ADMIN — SODIUM CHLORIDE, SODIUM LACTATE, POTASSIUM CHLORIDE, AND CALCIUM CHLORIDE: .6; .31; .03; .02 INJECTION, SOLUTION INTRAVENOUS at 07:20

## 2025-06-30 RX ADMIN — ACETAMINOPHEN 975 MG: 325 TABLET, FILM COATED ORAL at 14:19

## 2025-06-30 RX ADMIN — FENTANYL CITRATE 50 MCG: 50 INJECTION, SOLUTION INTRAMUSCULAR; INTRAVENOUS at 08:36

## 2025-06-30 RX ADMIN — CHLORHEXIDINE GLUCONATE 15 ML: 1.2 RINSE ORAL at 06:50

## 2025-06-30 RX ADMIN — EPHEDRINE SULFATE 10 MG: 50 INJECTION, SOLUTION INTRAVENOUS at 09:02

## 2025-06-30 RX ADMIN — PHENYLEPHRINE HYDROCHLORIDE 100 MCG: 10 INJECTION INTRAVENOUS at 07:44

## 2025-06-30 RX ADMIN — DEXAMETHASONE SODIUM PHOSPHATE 10 MG: 10 INJECTION, SOLUTION INTRAMUSCULAR; INTRAVENOUS at 07:41

## 2025-06-30 RX ADMIN — Medication 20 MG: at 08:03

## 2025-06-30 RX ADMIN — BUPIVACAINE 10 ML: 13.3 INJECTION, SUSPENSION, LIPOSOMAL INFILTRATION at 10:13

## 2025-06-30 RX ADMIN — EPHEDRINE SULFATE 10 MG: 50 INJECTION, SOLUTION INTRAVENOUS at 07:50

## 2025-06-30 RX ADMIN — PROPOFOL 30 MG: 10 INJECTION, EMULSION INTRAVENOUS at 07:47

## 2025-06-30 RX ADMIN — BUPIVACAINE HYDROCHLORIDE 10 ML: 5 INJECTION, SOLUTION EPIDURAL; INTRACAUDAL; PERINEURAL at 10:13

## 2025-06-30 RX ADMIN — PROPOFOL 30 MG: 10 INJECTION, EMULSION INTRAVENOUS at 07:43

## 2025-06-30 RX ADMIN — CEFAZOLIN SODIUM 2000 MG: 2 SOLUTION INTRAVENOUS at 07:25

## 2025-06-30 RX ADMIN — PHENYLEPHRINE HYDROCHLORIDE 100 MCG: 10 INJECTION INTRAVENOUS at 07:47

## 2025-06-30 RX ADMIN — SODIUM CHLORIDE, SODIUM LACTATE, POTASSIUM CHLORIDE, AND CALCIUM CHLORIDE 125 ML/HR: .6; .31; .03; .02 INJECTION, SOLUTION INTRAVENOUS at 06:50

## 2025-06-30 RX ADMIN — FENTANYL CITRATE 25 MCG: 50 INJECTION, SOLUTION INTRAMUSCULAR; INTRAVENOUS at 08:15

## 2025-06-30 RX ADMIN — PROPOFOL 50 MG: 10 INJECTION, EMULSION INTRAVENOUS at 07:39

## 2025-06-30 RX ADMIN — PHENYLEPHRINE HYDROCHLORIDE 100 MCG: 10 INJECTION INTRAVENOUS at 08:16

## 2025-06-30 RX ADMIN — PROPOFOL 30 MG: 10 INJECTION, EMULSION INTRAVENOUS at 08:03

## 2025-06-30 RX ADMIN — PROPOFOL 20 MG: 10 INJECTION, EMULSION INTRAVENOUS at 08:36

## 2025-06-30 RX ADMIN — ACETAMINOPHEN 975 MG: 325 TABLET, FILM COATED ORAL at 06:50

## 2025-06-30 RX ADMIN — GLYCOPYRROLATE 0.1 MG: 0.2 INJECTION, SOLUTION INTRAMUSCULAR; INTRAVENOUS at 07:40

## 2025-06-30 RX ADMIN — ONDANSETRON 4 MG: 2 INJECTION INTRAMUSCULAR; INTRAVENOUS at 07:47

## 2025-06-30 RX ADMIN — PHENYLEPHRINE HYDROCHLORIDE 100 MCG: 10 INJECTION INTRAVENOUS at 08:05

## 2025-06-30 RX ADMIN — MIDAZOLAM 2 MG: 1 INJECTION INTRAMUSCULAR; INTRAVENOUS at 07:25

## 2025-06-30 RX ADMIN — EPHEDRINE SULFATE 10 MG: 50 INJECTION, SOLUTION INTRAVENOUS at 08:08

## 2025-06-30 RX ADMIN — PROPOFOL 30 MG: 10 INJECTION, EMULSION INTRAVENOUS at 07:55

## 2025-06-30 RX ADMIN — PROPOFOL 100 MCG/KG/MIN: 10 INJECTION, EMULSION INTRAVENOUS at 07:39

## 2025-06-30 RX ADMIN — LIDOCAINE HYDROCHLORIDE 50 MG: 10 INJECTION, SOLUTION EPIDURAL; INFILTRATION; INTRACAUDAL at 07:39

## 2025-06-30 RX ADMIN — BUPIVACAINE HYDROCHLORIDE IN DEXTROSE 1.4 ML: 7.5 INJECTION, SOLUTION SUBARACHNOID at 07:33

## 2025-06-30 RX ADMIN — SODIUM CHLORIDE, SODIUM LACTATE, POTASSIUM CHLORIDE, AND CALCIUM CHLORIDE: .6; .31; .03; .02 INJECTION, SOLUTION INTRAVENOUS at 08:34

## 2025-06-30 RX ADMIN — GABAPENTIN 300 MG: 300 CAPSULE ORAL at 06:50

## 2025-06-30 RX ADMIN — FENTANYL CITRATE 25 MCG: 50 INJECTION, SOLUTION INTRAMUSCULAR; INTRAVENOUS at 09:29

## 2025-06-30 RX ADMIN — PROPOFOL 30 MG: 10 INJECTION, EMULSION INTRAVENOUS at 07:51

## 2025-06-30 NOTE — ANESTHESIA POSTPROCEDURE EVALUATION
Post-Op Assessment Note    CV Status:  Stable  Pain Score: 0    Pain management: adequate    Multimodal analgesia used between 6 hours prior to anesthesia start to PACU discharge    Mental Status:  Alert   Hydration Status:  Stable   PONV Controlled:  None   Airway Patency:  Patent  Two or more mitigation strategies used for obstructive sleep apnea   Post Op Vitals Reviewed: Yes    No anethesia notable event occurred.            Last Filed PACU Vitals:  Vitals Value Taken Time   Temp     Pulse 75    /62    Resp 16    SpO2  99

## 2025-06-30 NOTE — PROGRESS NOTES
Patient alert and awake, dressing clean, dry and intact, vital signs WNL. Patient transferred to APU via bed. Bedside report given to ZOEY Syed. Bed in lowest position and locked, side rails up, call bell within reach.

## 2025-06-30 NOTE — TELEPHONE ENCOUNTER
Caller: Shantanu from [facility]    Doctor: Dr. Mathias    Reason for call: Wants to know if you are aware the patient takes Lorazepam and you are ordering Pantoprazole // Please advise is you would like this medication filled and thank you   [id number] - [place] [id number] [place] [address]     Call back#: [phone]    Fa:[phone]

## 2025-06-30 NOTE — OP NOTE
OPERATIVE REPORT  PATIENT NAME: Casey Machado  : 1955  MRN: [id number]  Pt Location:  [facility] OR ROOM 01    Surgery Date: 2025    Surgeons and Role:     * Roman Rudd, DO - Primary     * Russell Mathias PA-C - Assisting, no qualified resident was available an assistant was needed for patient positioning, prepping and draping, soft tissue retraction, protection of vital structures throughout the case, exposure of the femur and tibia for robotic assisted resection and implantation of final prosthesis, superficial closure, and to complete the case safely.     * Justo Palma,  ATC/OTC - 2nd Assist    Preop Diagnosis:  Primary osteoarthritis of right knee  Chronic pain right knee    Postop diagnosis:   Primary osteoarthritis right knee  Chronic pain right knee    Procedure(s):  Right - ARTHROPLASTY KNEE TOTAL W ROBOT - CEMENTED    Specimens:  * No specimens in log *    Estimated Blood Loss:   50 mL    Drains: None    Anesthesia Type:   Spinal with sedation, postoperative adductor canal block with Exparel    Intravenous fluids: 1 L    Antibiotics: Ancef 2 g    Urine output: No Jackson    Tourniquet time: 66 minutes at 250 mmHg    Implant Name Type Inv. Item Serial No.  Lot No. LRB No. Used Action   CEMENT BN 40 GM PALACOS RADIOPAQUE W/O ANTIBIOTIC - [id number]  CEMENT BN 40 GM PALACOS RADIOPAQUE W/O ANTIBIOTIC  [employer] [id number] Right 1 Implanted   CEMENT BN 40 GM PALACOS RADIOPAQUE W/O ANTIBIOTIC - [id number]  CEMENT BN 40 GM PALACOS RADIOPAQUE W/O ANTIBIOTIC  [employer] [id number] Right 1 Implanted   INSERT TIB SZ 6 8MM RT MED STAB ATTUNE - [id number]  INSERT TIB SZ 6 8MM RT MED STAB ATTUNE  DEPUY [id number] Right 1 Implanted   COMPONENT PATELLA 35MM MEDIAL DOME ATTUNE - [id number]  COMPONENT PATELLA 35MM MEDIAL DOME ATTUNE  DEPUY [id number] Right 1 Implanted   COMPONENT FEM SZ 6 NRW RT CMNT CR ATTUNE - [id number]  COMPONENT FEM SZ 6 NRW RT CMNT CR ATTUNE  DEPUY [id number]  Right 1 Implanted   BASEPLATE TIBIAL SZ 6 CMNT FX BRNG ATTUNE S PLUS - [id number]  BASEPLATE TIBIAL SZ 6 CMNT FX BRNG ATTUNE S PLUS  DEPUY [id number] Right 1 Implanted     Operative Indications:  Primary osteoarthritis of right knee  Chronic pain right knee  Patient is a very pleasant 70-year-old female known to me for treatment of activity related right knee pain.  The patient has attempted multiple forms conservative measure treatment and all have failed to provide long-lasting relief of her discomfort.  With failed conservative treatment, persistent activity related pain, and end-stage osteoarthritis of the right knee I recommended that she undergo robotic assisted right total knee arthroplasty.  She was agreeable to this.  Consents were signed and placed in the chart.  Patient was optimized by her medical subspecialist in preparation for the procedure.  Ultimately the patient was deemed optimized and she underwent robotic assisted right total knee arthroplasty on 6/30/2025.    Operative Findings:  Intraoperatively she was found to have range of motion from 20 degrees of flexion to 105 degrees of flexion with a 5 degree varus alignment of the right lower extremity.  There was grade 4 degenerative change in all 3 compartments of the knee.  There is robust osteophyte formation around the distal femur, proximal tibia, intercondylar notch, and patella.  Robotic assisted right total knee arthroplasty was successfully performed without complication.  The final construct had excellent range of motion from 0 to 120 degrees with excellent stability at 0 degrees 30 degrees and 90 degrees.  The overall limb alignment was restored to 1 degree of varus alignment.  The patella tracked midline and flat.  After closure of the arthrotomy range of motion, stability, patellar tracking were unchanged.  Patient tolerated the procedure well.  There were no complications.    Complications:   None    Knee Approach: Medial  Parapatellar    Chronic Narcotic Use:  No    Procedure and Technique:  The patient was identified and marked in the preoperative holding area. Final questions were addressed. Consents were visualized for correct laterality and the intended procedure. Patient was taken back to the operating room where they were transferred to the operating room table and administered spinal anesthesia by the anesthesia staff. Tourniquet was then applied to the right thigh. The right lower extremity was prepped and draped in the standard sterile fashion with the addition of the knee positioner.  The patient was administered 2 g of IV Ancef. Tranexamic acid was administered by the anesthesia staff.  A final timeout was performed and all members of the operating room staff were in agreement of the intended procedure and the correct laterality was confirmed.  An anterior knee incision was marked over the anterior right knee and carried over the medial portion of the patella down medial to the tibial tubercle.  The leg was exsanguinated and the tourniquet was inflated to 250 mmHg.  An incision was made over the anterior knee using a scalpel, and sharp dissection was carried deep through Tiera's fascia creating full thickness flaps.  The extensor mechanism was identified.  A standard medial parapatellar arthrotomy was performed using a scalpel, and upon doing so benign-appearing yellow intra-articular fluid was expressed.  The anterior horn of the medial and lateral meniscus was removed. 50% of the patellar fat pad was removed for proper exposure. The distal arthrotomy was used to initiate a medial release around the proximal tibia at the joint line to the mid coronal plane.  On inspection there was diffuse grade 4 degenerative change in the medial compartment, lateral compartment, and patellofemoral compartment.  Preparations were made for robotic assisted total knee arthroplasty.  The periarticular osteophytes were removed from around  the distal femur, proximal tibia, and intercondylar notch.  These osteophyte complexes were quite large around the distal femur, proximal tibia, patella, and intercondylar notch.  A partial synovectomy was performed.  The remnants of the ACL and the PCL were removed with electrocautery.  The visualized portions of the medial and lateral meniscus were removed.  The distal femur and proximal tibial arrays were placed without complication.  The checkpoints were created the distal femur proximal tibia.  The hip center was captured.  Anatomic registration was carried out in sequence.  Range of motion was evaluated and the knee was in 5 degrees of varus, and the range of motion was from 20 degrees-105 degrees.  The Proadjust graph was created.  Through manipulating the position of the femoral and tibial implants a well-balanced Proadjust graph was created and this was excepted as the intraoperative plan.  The robot was brought into the surgical field.  The retractors were placed around the distal femur proximal tibia.  Robotic assisted resection was performed of the distal femur in sequence without damage to the periarticular tissues.  The tibia was subluxed anteriorly and the proximal tibia was resected without complication.  All bony fragments were removed and resection appeared to be complete.  The knee was brought out into the full extension in the posterior compartment was evaluated.  The remnants of the medial and lateral meniscus were removed with electrocautery.  The large posterior condylar osteophytes took a significant amount of effort to remove from the medial and lateral posterior compartment.  This was done with a combination of an osteotome and rongeur.  Based on the intraoperative plan a size 6 femoral notch guide was placed and the trochlea was prepared.  It was clear that the patient would benefit from a size 6 narrow femoral prosthesis.  The size 6 right CR trial was placed upon the distal femur and a  size 8 polyethylene medial stabilized insert was placed into the articulation.  The knee was cycled through a range of motion and is range of motion was evaluated.  The overall alignment of the limb was restored to 1 degree of varus, and range of motion was from 0-120 degrees where the leg was limited by body habitus.  This construct had excellent stability at 0 degrees, 30 degrees, 90 degrees.  This was deemed to be the final construct.  The arrays were removed from the distal femur proximal tibia without complication.  The lug holes in the femur were drilled.  The trials were removed and the tibia was subluxed anteriorly.  The tibia was sized and was found that a size 6 base plate would be appropriate.  This was aligned with the medial 1/3 of the proximal tibial tubercle and pinned into place.  The tibia was then reamed, broached, and finished in sequence.  The base plate was removed.    Attention was then turned to the patella. The osteo synovial reflection was revealed using a Bovie. The patella height measured 21 millimeters prior to resection.  The cutting guide was set for the appropriate depth and the patella was evenly resected using oscillating saw. The patella was sized and found to be a 35 mm patellar button.  The 35 mm patellar button was then medialized over the patella and the lug holes were drilled. A trial patella button was placed upon the patella and the pre osteotomy patellar height was restored.  A lateral facetectomy of the patella was performed. The soft tissues of the posterior capsule were cauterized using Bovie to ensure hemostasis. The posterior capsule and medial and lateral periarticular soft tissues were injected with a periarticular analgesic cocktail.     The knee was again irrigated in preparation for cementation.  The tibia was subluxed and all retractors were in place for cementation when final implants were confirmed and placed upon the back table.  2 bags of Palacos cement  without gentamicin were mixed. And the final size 6 S+ tibial implant, 8 mm AOX CR medial stabilized polyethylene insert, size 6 narrow right CR femur, and 35 mm patella button were cemented in sequence.  Excess cement was removed after each implant was in place.  As the cement cured the remainder of the periarticular pain cocktail was injected into the soft tissues around the knee.  Irrigation then followed with normal saline followed by Biasurge irrigation.  After the cement had cured the joint was inspected for any residual loose bodies of cement and these were removed. The tourniquet was released after 66 minutes at 250 mmHg.     The tracking and stability of the final components were assessed. The patella tracked midline without tilt, and the knee was stable in both flexion and extension, coronal stability was unchanged, and the knee demonstrated range of motion from 0-120°.  Hemostasis was achieved using electrocautery.       Closure began using a #1 barbed bidirectional suture for the arthrotomy.  Portions of the arthrotomy were oversewn with a #2 Ethibond suture.  After closure of the arthrotomy range of motion to gravity was tested and was found to be 0-120° of flexion. Quarter percent Marcaine plain was injected in the subcutaneous soft tissues.  The deep subcutaneous tissues were reapproximated with undyed 0 Vicryl, the superficial subcutaneous tissues were reapproximated with undyed 2-0 Vicryl, the skin edges reapproximated with a running 3-0 bidirectional barbed Monocryl suture, and the skin edges were sealed with Exofin.  After the Exofin had dried a silver impregnated Mepilex dressing was placed over the incision.  The drapes were removed and HEIDI stockings were placed on the bilateral lower extremities. Patient was then awakened from anesthesia without difficulty, and transferred to PACU in stable condition.      I was present for all critical portions of the procedure.    Patient Disposition:  PACU        SIGNATURE: Roman Rudd, DO  DATE: June 30, 2025  TIME: 4:07 PM

## 2025-06-30 NOTE — ANESTHESIA POSTPROCEDURE EVALUATION
Post-Op Assessment Note    CV Status:  Stable  Pain Score: 1    Pain management: adequate       Mental Status:  Alert and awake   Hydration Status:  Euvolemic   PONV Controlled:  Controlled   Airway Patency:  Patent     Post Op Vitals Reviewed: Yes    No anethesia notable event occurred.    Staff: Anesthesiologist           Last Filed PACU Vitals:  Vitals Value Taken Time   Temp 97.9 °F (36.6 °C) 06/30/25 09:50   Pulse 66 06/30/25 10:15   /55 06/30/25 10:15   Resp 18 06/30/25 10:15   SpO2 95 % 06/30/25 10:15       Modified Neno:     Vitals Value Taken Time   Activity 2 06/30/25 09:50   Respiration 2 06/30/25 09:50   Circulation 2 06/30/25 09:50   Consciousness 2 06/30/25 09:50   Oxygen Saturation 2 06/30/25 09:50     Modified Neno Score: 10

## 2025-06-30 NOTE — PERIOPERATIVE NURSING NOTE
Received from Pac[facility]. Awake,alert. Denies pain at present. Pt able to move right leg. Right knee dsg dry, intact. Ice pack to right knee. Right pedal pulse palpable, marked. Call bell in reach, siderails up. Lunch ordered.

## 2025-06-30 NOTE — OCCUPATIONAL THERAPY NOTE
Occupational Therapy Evaluation/Treatment       06/30/25 1415   OT Last Visit   OT Visit Date 06/30/25   Note Type   Note type Evaluation   Pain Assessment   Pain Assessment Tool 0-10   Pain Score 4  (at rest; worse with movement)   Pain Location/Orientation Orientation: Right;Location: Knee   Hospital Pain Intervention(s) Cold applied;Repositioned;Elevated;Rest;Ambulation/increased activity  (RN Dinah aware of incr pain at end of session)   Restrictions/Precautions   Weight Bearing Precautions Per Order Yes   RLE Weight Bearing Per Order WBAT   Other Precautions Fall Risk;Pain   Home Living   Type of Home House   Home Layout Two level;1/2 bath on main level;Bed/bath upstairs   Bathroom Shower/Tub Walk-in shower   Bathroom Toilet Raised   Home Equipment Walker;Cane   Prior Function   Level of [place] Independent with ADLs;Independent with functional mobility;Independent with IADLS   Lives With Spouse;Daughter   IADLs Independent with driving;Independent with medication management;Independent with meal prep   Falls in the last 6 months 0   Vocational Full time employment  ()   General   Additional Pertinent History Pt is POD #0 R TKA, anticipate d/c home later today   Family/Caregiver Present No   Subjective   Subjective Pt agreeable to OT evaluation   ADL   Eating Assistance 7  Independent   Grooming Assistance 5  Supervision/Setup   UB Bathing Assistance 5  Supervision/Setup   LB Bathing Assistance 5  Supervision/Setup   UB Dressing Assistance 5  Supervision/Setup   LB Dressing Assistance 5  Supervision/Setup   Toileting Assistance  5  Supervision/Setup   Bed Mobility   Supine to Sit Unable to assess   Sit to Supine Unable to assess   Additional Comments pt sitting OOB in chair upon OT arrival and returned to chair at end of session   Transfers   Sit to Stand 4  Minimal assistance  (progressing to supervision)   Stand to Sit 5  Supervision   Functional Mobility   Functional Mobility 4   Minimal assistance  (progressing to supervision)   Additional Comments short household distances   Additional items Rolling walker   Balance   Static Sitting Good   Dynamic Sitting Fair   Static Standing Fair   Dynamic Standing Fair -   Activity Tolerance   Activity Tolerance Patient tolerated treatment well;Patient limited by fatigue;Patient limited by pain   Nurse Made Aware RN Dinah   RUE Assessment   RUE Assessment WFL   LUE Assessment   LUE Assessment WFL   Cognition   Overall Cognitive Status WFL   Arousal/Participation Alert;Cooperative   Attention Within functional limits   Orientation Level Oriented X4   Memory Within functional limits   Following Commands Follows all commands and directions without difficulty   Assessment   Limitation Decreased ADL status;Decreased endurance;Decreased self-care trans;Decreased high-level ADLs  (decreased balance and mobility)   Prognosis Good   Assessment Patient evaluated by Occupational Therapy.  Patient admitted with S/P total knee replacement using cement, right.  The patients occupational profile, medical and therapy history includes a extensive additional review of physical, cognitive, or psychosocial history related to current functional performance.  Comorbidities affecting functional mobility and ADLS include: asthma, anxiety, depression, OP, OA B knees.  Prior to admission, patient was independent with functional mobility without assistive device, independent with ADLS, independent with IADLS, and works full time.  The evaluation identifies the following performance deficits: decreased ROM, weakness, decreased endurance, increased fall risk, new onset of impairment of functional mobility, decreased ADLS, decreased IADLS, pain, decreased activity tolerance, and decreased strength, that result in activity limitations and/or participation restrictions. This evaluation requires clinical decision making of high complexity, because the patient presents with comorbidites  that affect occupational performance and required significant modification of tasks or assistance with consideration of multiple treatment options.  The Barthel Index was used as a functional outcome tool presenting with a score of Barthel Index Score: 70, indicating moderate limitations of functional mobility and ADLS.  The patient's raw score on the AM-PAC Daily Activity Inpatient Short Form is 19. A raw score of greater than or equal to 19 suggests the patient may benefit from discharge to home. Please refer to the recommendation of the Occupational Therapist for safe discharge planning.  Patient will benefit from skilled Occupational Therapy services to address above deficits and facilitate a safe return to prior level of function.   Goals   Patient Goals to get back to normal   STG Time Frame   (1-7 days)   Short Term Goal  Goals established to promote Patient Goals: to get back to normal: Grooming: independent standing at sink; Bathing: independent; Upper Body Dressing independent; Lower Body Dressing: independent; Toileting: independent; Patient will increase ambulatory standard toilet transfer to independent with rolling walker to increase performance and safety with ADLS and functional mobility; Patient will increase standing tolerance to 5 minutes during ADL task to decrease assistance level and decrease fall risk; Patient will increase bed mobility to independent in preparation for ADLS and transfers; Patient will increase functional mobility to and from bathroom with rolling walker independently to increase performance with ADLS and to use a toilet; Patient will tolerate 8 minutes of UE ROM/strengthening to increase general activity tolerance and performance in ADLS/IADLS; Patient will improve functional activity tolerance to 10 minutes of sustained functional tasks to increase participation in basic self-care and decrease assistance level; Patient will increase dynamic sitting balance to fair+ to  improve the ability to sit at edge of bed or on a chair for ADLS;  Patient will increase dynamic standing balance to fair to improve postural stability and decrease fall risk during standing ADLS and transfers.  Patient will tolerate out of bed to chair for all meals to increase ability to feed self and actively engage in ADLS.   LTG Time Frame   (8-14 days)   Long Term Goal Patient will increase standing tolerance to 10 minutes during ADL task to decrease assistance level and decrease fall risk; Patient will tolerate 15 minutes of UE ROM/strengthening to increase general activity tolerance and performance in ADLS/IADLS; Patient will improve functional activity tolerance to 20 minutes of sustained functional tasks to increase participation in basic self-care and decrease assistance level; Patient will increase dynamic sitting balance to good to improve the ability to sit at edge of bed or on a chair for ADLS;  Patient will increase dynamic standing balance to fair+ to improve postural stability and decrease fall risk during standing ADLS and transfers.  Pt will score >/= 23/24 on AM-PAC Daily Activity Inpatient scale to promote safe independence with ADLs and functional mobility; Pt will score >/= 100/100 on Barthel Index in order to decrease caregiver assistance needed and increase ability to perform ADLs and functional mobility.   Plan   Treatment Interventions ADL retraining;Functional transfer training;UE strengthening/ROM;Endurance training;Patient/family training;Equipment evaluation/education;Activityengagement;Energy conservation   Goal Expiration Date 07/14/25   OT Frequency 3-5x/wk   Discharge Recommendation   Rehab Resource Intensity Level, OT No post-acute rehabilitation needs  (OPPT only)   AM-PAC Daily Activity Inpatient   Lower Body Dressing 3   Bathing 3   Toileting 3   Upper Body Dressing 3   Grooming 3   Eating 4   Daily Activity Raw Score 19   Daily Activity Standardized Score (Calc for Raw Score  >=11) 40.22   AM-PAC Applied Cognition Inpatient   Following a Speech/Presentation 4   Understanding Ordinary Conversation 4   Taking Medications 4   Remembering Where Things Are Placed or Put Away 4   Remembering List of 4-5 Errands 4   Taking Care of Complicated Tasks 4   Applied Cognition Raw Score 24   Applied Cognition Standardized Score 62.21   Barthel Index   Feeding 10   Bathing 0   Grooming Score 5   Dressing Score 5   Bladder Score 10   Bowels Score 10   Toilet Use Score 5   Transfers (Bed/Chair) Score 10   Mobility (Level Surface) Score 10   Stairs Score 5   Barthel Index Score 70   Additional Treatment Session   Start Time 1405   End Time 1415   Treatment Assessment Pt completed standard toilet transfer using RW with supervision. Voided bladder, clothing mgmt and hygiene with supervision. Sit to stand supervision. Additional fxl mobility using RW with supervision. Completed LB dressing with supervision and +time, UB dressing with set-up. Educated on and reviewed safe dressing strategies to increase ease and independence with pt verbalizing understanding. Educated pt on safe car transfer technique with pt verbalizing understanding. All questions and concerns addressed appropriately at this time. Pt tolerated OT treatment session well. Anticipate no OT needs once d/c'd.     Erendira Morris OTR/L   [place] License # [id number]  [place] License # [id number]

## 2025-06-30 NOTE — PHYSICAL THERAPY NOTE
"       PHYSICAL THERAPY EVALUATION/TREATMENT     06/30/25 1315   PT Last Visit   PT Visit Date 06/30/25   Note Type   Note type Evaluation   Pain Assessment   Pain Assessment Tool 0-10   Pain Score 6   Pain Location/Orientation Orientation: Right;Location: Knee  (Pt had pain meds prior to PT.)   Restrictions/Precautions   Weight Bearing Precautions Per Order Yes   RLE Weight Bearing Per Order WBAT   Other Precautions Pain;Fall Risk   Home Living   Type of Home House   Home Layout Two level;1/2 bath on main level;Bed/bath upstairs  (4 AYUSH with L rail)   Home Equipment Walker;Cane   Prior Function   Level of [place] Independent with ADLs;Independent with functional mobility   Lives With Spouse   Vocational   (Pt is an .)   General   Additional Pertinent History Pt is POD zero s/p R TKA with anticipated DC home  same today.   Family/Caregiver Present No   Cognition   Overall Cognitive Status WFL   Arousal/Participation Alert   Orientation Level Oriented X4   Memory Within functional limits   Following Commands Follows all commands and directions without difficulty   Subjective   Subjective \"I feel better than I thought\"   RLE Assessment   RLE Assessment   (knee AAROM 10-90, quad set fair+, MMT hip 3/5, knee 3-/5, ankle 4/5)   LLE Assessment   LLE Assessment WNL   Bed Mobility   Supine to Sit 5  Supervision   Transfers   Sit to Stand 4  Minimal assistance   Stand to Sit 4  Minimal assistance   Stand pivot 4  Minimal assistance   Additional items   (with walker)   Ambulation/Elevation   Gait pattern   (flexed posture, antalgic R, R knee flexed in stance however no buckling.)   Gait Assistance 4  Minimal assist   Assistive Device Rolling walker   Distance 150 feet   Stair Management Assistance 5  Supervision   Additional items Verbal cues;Tactile cues   Stair Management Technique One rail L;Step to pattern;With cane   Number of Stairs 4   Balance   Static Sitting Good   Static Standing Fair "   Ambulatory Fair -   Activity Tolerance   Activity Tolerance Patient tolerated treatment well;Patient limited by fatigue;Patient limited by pain   Nurse Made Aware Dinah RN that pt is ready to go home from the PT point of view.   Assessment   Prognosis Good   Problem List Decreased strength;Decreased mobility;Impaired balance;Decreased range of motion;Pain   Assessment Pt is 70 y.o. female seen for PT evaluation s/p admit to [facility] on 6/30/2025 w/ S/P total knee replacement using cement, right. PT consulted to assess pt's functional mobility and d/c needs. Order placed for PT eval and tx, w/ WBAT R LE order.  Co-morbidities affecting patient's physical performance include: asthma, anxiety, depression, OP, OA B knees.  Personal factors affecting patient at time of initial evaluation include: ambulating with assistive device and inability to navigate community distances.         Prior to admission, patient was independent with functional mobility with cane and independent with ADLS.  Upon evaluation: Pt was able to ambulate with a walker 150 feet with supervision/min assist.          Please find objective findings from Physical Therapy assessment regarding body systems outlined above with impairments and limitations including weakness, decreased ROM, gait deviations, pain, decreased functional mobility tolerance, and fall risk.The Barthel Index was used as a functional outcome tool presenting with a score of Barthel Index Score: 70 today indicating moderate limitations of functional mobility and ADLS.  Patient's clinical presentation is currently unstable/unpredictable as seen in patient's presentation of changing level of pain, increased fall risk, new onset of impairment of functional mobility, and new onset of weakness. Pt would benefit from continued Physical Therapy treatment to address deficits as defined above and maximize level of functional mobility.     As demonstrated by objective findings, the  "assigned level of complexity for this evaluation is high.    The patient's AM-PAC Basic Mobility Inpatient Short Form Raw Score is 20. A Raw score of greater than 16 suggests the patient may benefit from discharge to home. Please also refer to the recommendation of the Physical Therapist for safe discharge planning.   Goals   Patient Goals \"be able to walk normally\"   STG Expiration Date 07/07/25   Short Term Goal #1 1. independent bed mobility,   2. independent transfers,   3. independent ambulation with walker 150 feet ,   4. independent up and down 1 flight of steps with a rail and a cane,   5. independent with a HEP designed to improve pt's R knee ROM and strength   LTG Expiration Date 07/14/25   Long Term Goal #1 1. improve R knee ROM to at least 5-100,   2. improve R LE strength to at least 4/5.   3.Pt will be independently ambulatory outdoor surfaces with least restrictive device with a steady non antalgic gait x 250 feet.   Plan   Treatment/Interventions Therapeutic exercise;LE strengthening/ROM;Functional transfer training;Gait training;Equipment eval/education;Patient/family training;Spoke to nursing   PT Frequency Twice a day   Discharge Recommendation   Rehab Resource Intensity Level, PT III (Minimum Resource Intensity)   Equipment Recommended   (Pt has a cane and walker.)   AM-PAC Basic Mobility Inpatient   Turning in Flat Bed Without Bedrails 4   Lying on Back to Sitting on Edge of Flat Bed Without Bedrails 4   Moving Bed to Chair 3   Standing Up From Chair Using Arms 3   Walk in Room 3   Climb 3-5 Stairs With Railing 3   Basic Mobility Inpatient Raw Score 20   Basic Mobility Standardized Score 43.99   [facility] Highest Level Of Mobility   -HLM Goal 6: Walk 10 steps or more   -HLM Achieved 7: Walk 25 feet or more   Barthel Index   Feeding 10   Bathing 0   Grooming Score 5   Dressing Score 5   Bladder Score 10   Bowels Score 10   Toilet Use Score 5   Transfers (Bed/Chair) Score 10   Mobility " "(Level Surface) Score 10   Stairs Score 5   Barthel Index Score 70   Additional Treatment Session   Start Time 1300   End Time 1315   Treatment Assessment S:  \"I have been doing these exercises\"   O:  Pt issued a written HEP of ankle pumps, quad set, glut set, heel slides, LAQ.  Pt performed x 10 each of the same.  Ice to knee after PT.  Pt educated to perform her HEP 3x per day 1-3 sets of 10 and to walk every 1-2 hours with the walker.   A:  Pt demonstrates good function POD zero s/p R TKA.    P:  Pt to follow up with OP PT later this week.   End of Consult   Patient Position at End of Consult All needs within reach   Licensure   [place] License Number  Erendira Camar[place] PT  [id number]     "

## 2025-06-30 NOTE — INTERVAL H&P NOTE
H&P reviewed. After examining the patient I find no changes in the patients condition since the H&P had been written.  In addition the patient was seen and examined at bedside.  She states her activity related right knee pain continues to be severe.  Please see today's exam below.  Patient denies any recent fever, chills, nausea, vomiting, headache, chest pain, trouble breathing.  The patient has been seen and cleared by medical subspecialist in preparation for the procedure.  The patient be a good candidate for aspirin 81 mg p.o. twice daily with Protonix postoperatively for DVT prophylaxis.  The patient be a good candidate for outpatient physical therapy following her discharge from the hospital today.  All questions addressed this morning.  Proceed the OR for robotic assisted right total knee arthroplasty.    Vitals:    06/30/25 0637   BP: 134/65   Pulse: 72   Resp: 18   Temp: 97.9 °F (36.6 °C)   SpO2: 96%     Right Knee Exam      Muscle Strength   The patient has normal right knee strength.     Tenderness   The patient is experiencing tenderness in the lateral joint line and medial joint line.     Range of Motion   Extension:  5   Flexion:  110      Tests   Varus: negative Valgus: negative  Lachman:  Anterior - negative      Drawer:  Anterior - negative      Patellar apprehension: negative     Other   Erythema: absent  Scars: absent  Sensation: normal  Pulse: present  Swelling: none  Effusion: no effusion present     Comments:  Varus deformity passively correctable  Stable at 0, 30 and 90 degrees  Neurovascularly in tact distally  No warmth or erythema  Parapatellar crepitance noted  PF grind: positive  Skin diffusely intact    Roman Rudd D.O.  Division of Adult Reconstruction  Department of Orthopaedic Surgery  [facility]

## 2025-06-30 NOTE — ANESTHESIA PROCEDURE NOTES
Peripheral Block    Patient location during procedure: holding area  Start time: 6/30/2025 10:13 AM  Reason for block: at surgeon's request and post-op pain management  Staffing  Performed by: Ellen Herrera MD  Authorized by: Ellen Herrera MD    Preanesthetic Checklist  Completed: patient identified, IV checked, site marked, risks and benefits discussed, surgical consent, monitors and equipment checked, pre-op evaluation and timeout performed  Peripheral Block  Prep: ChloraPrep  Patient monitoring: frequent blood pressure checks, continuous pulse oximetry and heart rate  Block type: Adductor Canal  Laterality: right  Injection technique: single-shot  Procedures: ultrasound guided, Ultrasound guidance required for the procedure to increase accuracy and safety of medication placement and decrease risk of complications.  Ultrasound permanent image saved  bupivacaine (PF) (MARCAINE) 0.5 % injection 20 mL - Perineural   10 mL - 6/30/2025 10:13:00 AM  bupivacaine liposomal (EXPAREL) 1.3 % injection 20 mL - Perineural   10 mL - 6/30/2025 10:13:00 AM  Needle  Needle type: Stimuplex   Needle gauge: 20 G  Needle length: 4 in  Needle localization: anatomical landmarks and ultrasound guidance  Needle insertion depth: 6 cm  Assessment  Injection assessment: incremental injection, frequent aspiration, injected with ease, negative aspiration, negative for heart rate change, no paresthesia on injection, no symptoms of intraneural/intravenous injection and needle tip visualized at all times  Paresthesia pain: none  Post-procedure:  site cleaned  patient tolerated the procedure well with no immediate complications

## 2025-06-30 NOTE — ANESTHESIA PREPROCEDURE EVALUATION
Procedure:  ARTHROPLASTY KNEE TOTAL W ROBOT - RIGHT - SAME DAY - CEMENTED (Right: Knee)    Relevant Problems   ANESTHESIA (within normal limits)      CARDIO   (+) Benign essential hypertension      ENDO (within normal limits)      MUSCULOSKELETAL   (+) Osteoarthritis of both knees      NEURO/PSYCH   (+) Anxiety   (+) Moderate major depression (HCC)      PULMONARY   (+) Mild intermittent asthma without complication        Physical Exam    Airway     Mallampati score: II  TM Distance: >3 FB  Neck ROM: full  Mouth opening: >= 4 cm      Cardiovascular  Rhythm: regular, Rate: normal, Pulse is palpable.     Dental   No notable dental hx     Pulmonary   Breath sounds clear to auscultation    Neurological    She appears awake, alert and oriented x3.      Other Findings  post-pubertal.      Anesthesia Plan  ASA Score- 2     Anesthesia Type- regional, IV sedation with anesthesia and spinal with ASA Monitors.         Additional Monitors:     Airway Plan:            Plan Factors-Exercise tolerance (METS): >4 METS.    Chart reviewed. EKG reviewed.   Patient summary reviewed.    Patient is not a current smoker.              Induction- intravenous.    Postoperative Plan- .   Monitoring Plan - Monitoring plan - standard ASA monitoring  Post Operative Pain Plan - non-opiod analgesics        Informed Consent- Anesthetic plan and risks discussed with patient.  I personally reviewed this patient with the CRNA. Discussed and agreed on the Anesthesia Plan with the CRNA..      NPO Status:  Vitals Value Taken Time   Date of last liquid 06/30/25 06/30/25 06:19   Time of last liquid 0515 06/30/25 06:19   Date of last solid 06/29/25 06/30/25 06:19   Time of last solid 1900 06/30/25 06:19

## 2025-07-01 ENCOUNTER — TELEPHONE (OUTPATIENT)
Dept: OBGYN CLINIC | Facility: HOSPITAL | Age: 70
End: 2025-07-01

## 2025-07-01 RX ORDER — FLUTICASONE PROPIONATE 50 MCG
1 SPRAY, SUSPENSION (ML) NASAL DAILY
Qty: 16 ML | Refills: 2 | Status: SHIPPED | OUTPATIENT
Start: 2025-07-01

## 2025-07-01 NOTE — TELEPHONE ENCOUNTER
"Patient contacted for a postoperative follow up assessment. Patient states current pain level of a \"a little of a struggle, but going OK\" and is walking with RW.  Patient reports swelling and dressing is Dressing C/D/I. Patient is icing the site regularly. NN educated patient on post-op swelling, icing, and constipation and bruising.    Confirmed upcoming appointments w/ patient:   PT 7/3  Postop 7/14     We reviewed patients AVS medication list. Patient is taking Tylenol 975mg every 8 hours, ASA 81mg BID, Colace 100mg BID, and Protonix 40 mg daily, and Oxy 5 mg PRN. Patient has not had a BM but is taking prescribed Colace. Discussed postop constipation, increasing fluids/fiber, prunes or prune juice, adding Miralax daily into regimen if needed. Pt verbalizes understanding.      Patient denies nausea, vomiting, abdominal pain, chest pain, shortness of breath, fever, dizziness, and calf pain. Patient does not have any other questions or concerns at this time. Pt was encouraged to call with any questions, concerns or issues.  "

## 2025-07-03 ENCOUNTER — OFFICE VISIT (OUTPATIENT)
Dept: PHYSICAL THERAPY | Facility: CLINIC | Age: 70
End: 2025-07-03
Payer: COMMERCIAL

## 2025-07-03 DIAGNOSIS — G89.29 CHRONIC PAIN OF RIGHT KNEE: Primary | ICD-10-CM

## 2025-07-03 DIAGNOSIS — M25.561 CHRONIC PAIN OF RIGHT KNEE: Primary | ICD-10-CM

## 2025-07-03 PROCEDURE — 97110 THERAPEUTIC EXERCISES: CPT | Performed by: PHYSICAL THERAPIST

## 2025-07-03 PROCEDURE — 97530 THERAPEUTIC ACTIVITIES: CPT | Performed by: PHYSICAL THERAPIST

## 2025-07-03 NOTE — PROGRESS NOTES
Daily Note     Today's date: 7/3/2025  Patient name: Casey Machado  : 1955  MRN: [id number]  Referring provider: Roman Rudd DO  Dx:   Encounter Diagnosis     ICD-10-CM    1. Chronic pain of right knee  M25.561     G89.29       Start Time: 1310  Stop Time: 1350  Total time in clinic (min): 40 minutes  Subjective: Patient reports doing pretty well overall and has had BM. Reports pain at night and hard time sleeping. Denies calf pain and is walking every hour.     Patient Goals  Patient goals for therapy: decreased pain, increased motion, increased strength, independence with ADLs/IADLs and return to work     Pain  Current pain ratin  At best pain ratin  At worst pain ratin  Quality: dull ache and throbbing  Relieving factors: ice     Social Support     Employment status: working  Treatments  Previous treatment: injection treatment        Short Term: FROM DOS  Pt will report decreased levels of pain by at least 2 subjective ratings in 4 weeks  Pt will demonstrate improved ROM by at least 10 degrees in 4 weeks  Pt will demonstrate improved strength by 1/2 grade in 4 weeks.  Pt will be able to ambulate without AD in 4 weeks  Long Term: FROM DOS  Pt will be independent in their HEP in 8 weeks  Pt will be pain free with IADL's in 8 weeks.  Pt will display 5/5 quad strength MMT in 8 weeks.  Pt will be able to perform normal stair navigation in 8 weeks      Objective     GAIT: step thru gait pattern with RW with decreased step length  Squat assess: 25% depth  TUG: >30 sec. With RW               MMT     Hip         R          L   Flex. 3 4                  MMT          AROM          PROM     Knee      R          L         R          L           R         L   Flex. 4 5 100 115 105     Extn. 3 5 -5 -3 -3                  MMT     Ankle         R          L   PF 2 4   DF. 5 5      Knee: quad activation = fair    Assessment: Tolerated treatment well. Patient reported that she was surprised but she was able  "to do today during treatment. Good ROM and strength demonstrated today 3 days post op at this time. No calf pain reported and patient educated to continue to perform HEP as instructed.     Plan: Continue per plan of care.        Insurance:  AMA/CMS Eval/ Re-eval POC expires Auth #/ Referral # Total    Start date  Expiration date Extension  Visit limitation?  PT only or  PT+OT? Co-Insurance   CMS 6.23 9.15 needed                         AUTH #:  Date 6.23 7.3             Authed: Used 1 2              Remaining  5 4               Precautions: HTN, OP  Patient provided verbal consent to treatment plan and recommended interventions.  DOS: 6/30/25.    HEP: LAQ, SAQ, glute set, quad set, ankle pumps, heel slides    Manuals 6.23 7.3                                           Neuro Re-Ed                                                                        Ther Ex         Quad set  3x10, 3\"       Glute set  HEP       Ankle pump  HEP       LAQ  3x10       SAQ  2x10       Heel slides  PT flexion       Nu step  7' lvl 1       Calf raises  2x10       Stand march  3x10                                           Ther Activity         Pt ed  FB                Gait Training         stairs                       "

## 2025-07-06 ENCOUNTER — TELEPHONE (OUTPATIENT)
Dept: OBGYN CLINIC | Facility: CLINIC | Age: 70
End: 2025-07-06

## 2025-07-06 DIAGNOSIS — Z96.651 S/P TOTAL KNEE REPLACEMENT USING CEMENT, RIGHT: ICD-10-CM

## 2025-07-07 DIAGNOSIS — Z96.651 S/P TOTAL KNEE REPLACEMENT USING CEMENT, RIGHT: ICD-10-CM

## 2025-07-07 RX ORDER — OXYCODONE HYDROCHLORIDE 5 MG/1
TABLET ORAL
Qty: 40 TABLET | Refills: 0 | Status: SHIPPED | OUTPATIENT
Start: 2025-07-07 | End: 2025-07-14 | Stop reason: SDUPTHER

## 2025-07-07 RX ORDER — OXYCODONE HYDROCHLORIDE 5 MG/1
TABLET ORAL
Qty: 40 TABLET | Refills: 0 | OUTPATIENT
Start: 2025-07-07

## 2025-07-07 NOTE — TELEPHONE ENCOUNTER
Patient called the office very upset and in tears. She stated she has called multiple times for a refill and nobody is getting back to her. She stated he in so much pain and requesting a refill as soon as possible.         Doctor Name: Dr. Rudd      Medication Name: oxyCODONE (Roxicodone) 5 immediate release tablet       Pharmacy and Location: [facility] [address] [[id number]]         Pt. Preferred Callback Phone Number: [phone]      Thank you.        PLEASE ADVISE PATIENTS:    REFILL REQUESTS WILL BE PROCESSED WITHIN 24-48 HOURS.

## 2025-07-07 NOTE — TELEPHONE ENCOUNTER
Caller: Patient    Doctor: Dr. Rudd    Reason for call: Patient in a lot of pain asking for medication refill she had a total knee replacement done on 6/30    Call back#: [phone]

## 2025-07-07 NOTE — TELEPHONE ENCOUNTER
Oxy refill sent in by Dr. Rudd, called pt and notified. Encouraged her to f/u with pharmacy asap for . Pt verbalizes understanding and is appreciative of c/b. No additional needs at this time.

## 2025-07-07 NOTE — TELEPHONE ENCOUNTER
Spoke to patient. She reports taking the Tylenol 975mg TID and Oxycodone PRN (about every 6 hours). She is completely out of the oxycodone, she states she did take 10mg at times immediately postop.     She reports increased swelling today and bruising, and states the thigh to foot area of swelling, is causing increased pain.     We discussed rest/ice and elevation, and rotating ICE to all areas of pain/swelling.

## 2025-07-08 ENCOUNTER — OFFICE VISIT (OUTPATIENT)
Dept: PHYSICAL THERAPY | Facility: CLINIC | Age: 70
End: 2025-07-08
Payer: COMMERCIAL

## 2025-07-08 DIAGNOSIS — G89.29 CHRONIC PAIN OF RIGHT KNEE: Primary | ICD-10-CM

## 2025-07-08 DIAGNOSIS — M25.561 CHRONIC PAIN OF RIGHT KNEE: Primary | ICD-10-CM

## 2025-07-08 PROCEDURE — 97110 THERAPEUTIC EXERCISES: CPT | Performed by: PHYSICAL THERAPIST

## 2025-07-08 NOTE — PROGRESS NOTES
"Daily Note     Today's date: 2025  Patient name: Casey Machado  : 1955  MRN: [id number]  Referring provider: Roman Rudd DO  Dx:   Encounter Diagnosis     ICD-10-CM    1. Chronic pain of right knee  M25.561     G89.29       Start Time: 1145  Stop Time: 1230  Total time in clinic (min): 45 minutes  Subjective: Patient reports increased swelling yesterday and being without pain medication for majority of day yesterday. Reports continued trouble sleeping at this time.     Objective: see treatment diary    Assessment: Tolerated treatment well. Patient did well with treatment progression today incorporating LE strengthening.     Plan: Continue per plan of care.        Insurance:  AMA/CMS Eval/ Re-eval POC expires Auth #/ Referral # Total    Start date  Expiration date Extension  Visit limitation?  PT only or  PT+OT? Co-Insurance   CMS  9.15 needed                         AUTH #:  Date  7.3 7.8            Authed: Used 1 2 3             Remaining  5 4 3              Precautions: HTN, OP  Patient provided verbal consent to treatment plan and recommended interventions.  DOS: 25.    HEP: LAQ, SAQ, glute set, quad set, ankle pumps, heel slides    Manuals 6. 7.3 7.8      visit 1 2 3      Knee flexion   100 102 AROM      Knee extn  -5 -2                Neuro Re-Ed                                             Ther Ex         Quad set  3x10, 3\" 2x10, 3\" towel under knee, 2x10 without towel      LAQ  3x10 3x10      SAQ  2x10 3x10      Heel slides  PT flexion Peanut roll in 3x10      Nu step  7' lvl 1 7' lvl 1      Calf raises  2x10 2x10      Stand march  3x10       squats   2x10      Step up   2x10, 4\"                        Ther Activity         Pt ed  FB                Gait Training         stairs                       "

## 2025-07-10 ENCOUNTER — OFFICE VISIT (OUTPATIENT)
Dept: PHYSICAL THERAPY | Facility: CLINIC | Age: 70
End: 2025-07-10
Payer: COMMERCIAL

## 2025-07-10 DIAGNOSIS — G89.29 CHRONIC PAIN OF RIGHT KNEE: Primary | ICD-10-CM

## 2025-07-10 DIAGNOSIS — M25.561 CHRONIC PAIN OF RIGHT KNEE: Primary | ICD-10-CM

## 2025-07-10 PROCEDURE — 97110 THERAPEUTIC EXERCISES: CPT | Performed by: PHYSICAL THERAPIST

## 2025-07-10 NOTE — PROGRESS NOTES
"Daily Note     Today's date: 7/10/2025  Patient name: Casey Machado  : 1955  MRN: [id number]  Referring provider: Roman Rudd DO  Dx:   Encounter Diagnosis     ICD-10-CM    1. Chronic pain of right knee  M25.561     G89.29       Start Time: 1100  Stop Time: 1145  Total time in clinic (min): 45 minutes  Subjective: Patient reports doing well, some soreness entering treatment.     Objective: see treatment diary    Assessment: Tolerated treatment well. Patient educated on roll of feeling stretch during knee extension exercise and when lying supine for terminal knee extension. Progressed LE strengthening per patient's tolerance.     Plan: Continue per plan of care.        Insurance:  AMA/CMS Eval/ Re-eval POC expires Auth #/ Referral # Total    Start date  Expiration date Extension  Visit limitation?  PT only or  PT+OT? Co-Insurance   CMS  9.15 needed                         AUTH #:  Date  7.3 7.8 7.10           Authed: Used 1 2 3 4            Remaining  5 4 3 2             Precautions: HTN, OP  Patient provided verbal consent to treatment plan and recommended interventions.  DOS: 25.    HEP: LAQ, SAQ, glute set, quad set, ankle pumps, heel slides    Manuals 6.23 7.3 7.8 7.10     visit 1 2 3 4     Knee flexion   100 102 AROM 99 AROM     Knee extn  -5 -2  -3              Neuro Re-Ed                                             Ther Ex         Quad set  3x10, 3\" 2x10, 3\" towel under knee, 2x10 without towel 2x10, 3\" towel under knee, 2x10 without towel     LAQ  3x10 3x10 3x10     SAQ  2x10 3x10      Heel slides  PT flexion Peanut roll in 3x10 Peanut roll in 3x10     Nu step  7' lvl 1 7' lvl 1 7' lvl 1     Calf raises  2x10 2x10 3x10     Stand TKE    3x10, 3\"     squats   2x10 3x10     Step up   2x10, 4\" 3x10, 4\"     Knee flexion stret. On step    10x, 8\" step     SLR flexion    2x6 PT assist                                                  Ther Activity         Pt ed  FB                Gait " Training         stairs

## 2025-07-11 ENCOUNTER — EVALUATION (OUTPATIENT)
Dept: PHYSICAL THERAPY | Facility: CLINIC | Age: 70
End: 2025-07-11
Payer: COMMERCIAL

## 2025-07-11 DIAGNOSIS — G89.29 CHRONIC PAIN OF RIGHT KNEE: Primary | ICD-10-CM

## 2025-07-11 DIAGNOSIS — M25.561 CHRONIC PAIN OF RIGHT KNEE: Primary | ICD-10-CM

## 2025-07-11 PROCEDURE — 97110 THERAPEUTIC EXERCISES: CPT | Performed by: PHYSICAL THERAPIST

## 2025-07-11 NOTE — PROGRESS NOTES
Re-evaluation    Today's date: 2025  Patient name: Casey Machado  : 1955  MRN: [id number]  Referring provider: Roman Rudd DO  Dx:   Encounter Diagnosis     ICD-10-CM    1. Chronic pain of right knee  M25.561     G89.29       Start Time: 1100  Stop Time: 1145  Total time in clinic (min): 45 minutes  Subjective: Patient is 11 days post op Right TKA at this time. Patient reports sleeping is improving, able to perform stairs with step to pattern, and using SPC at times in the home.     Objective: see treatment diary    Patient Goals  Patient goals for therapy: decreased pain, increased motion, increased strength, independence with ADLs/IADLs and return to work- not met     Pain  Current pain ratin  At best pain ratin  At worst pain ratin when tired  Quality: dull ache and throbbing  Relieving factors: ice and medication     Social Support     Employment status: working     Short Term: FROM DOS- not met  Pt will report decreased levels of pain by at least 2 subjective ratings in 4 weeks  Pt will demonstrate improved ROM by at least 10 degrees in 4 weeks  Pt will demonstrate improved strength by 1/2 grade in 4 weeks.  Pt will be able to ambulate without AD in 4 weeks  Long Term: FROM DOS not met  Pt will be independent in their HEP in 8 weeks  Pt will be pain free with IADL's in 8 weeks.  Pt will display 5/5 quad strength MMT in 8 weeks.  Pt will be able to perform normal stair navigation in 8 weeks      Objective     GAIT: step thru gait pattern with RW with decreased step length  Squat assess: 50% BUE assist  TU sec. With RW               MMT     Hip         R          L   Flex. 3+ 4                  MMT          AROM          PROM     Knee      R          L         R          L           R         L   Flex. 4 5 101 115 104     Extn. 3+ 5 -2 -3 -1                  MMT     Ankle         R          L   PF 3 4   DF. 5 5        Assessment: Tolerated treatment well. Patient is progressing  "well at this time for being 11 days post op. Pain in controlled, strength is improving as well as ROM. Progression to SPC expected over next few visits. Patient will continue to benefit from treatment to address functional deficits.    Plan: 6-8 weeks, 2-3 visits per week.       Insurance:  AMA/CMS Eval/ Re-eval POC expires Auth #/ Referral # Total    Start date  Expiration date Extension  Visit limitation?  PT only or  PT+OT? Co-Insurance   CMS 6.23 9.15 needed                         AUTH #:  Date 6.23 7.3 7.8 7.10 7.11          Authed: Used 1 2 3 4 5           Remaining  5 4 3 2 1            Precautions: HTN, OP  Patient provided verbal consent to treatment plan and recommended interventions.  DOS: 6/30/25.    HEP: LAQ, SAQ, glute set, quad set, ankle pumps, heel slides    Manuals 6.23 7.3 7.8 7.10 7.11    visit 1 2 3 4 5    Knee flexion   100 102 AROM 99 AROM 101 AROM    Knee extn  -5 -2  -3 -2             Neuro Re-Ed         SLS     5x10'' UE assist                               Ther Ex         Quad set  3x10, 3\" 2x10, 3\" towel under knee, 2x10 without towel 2x10, 3\" towel under knee, 2x10 without towel 2x10, 3\" towel under knee, 2x10 without towel    LAQ  3x10 3x10 3x10 2x15    SAQ  2x10 3x10  3x10    Heel slides  PT flexion Peanut roll in 3x10 Peanut roll in 3x10 Peanut roll in 3x10    Nu step  7' lvl 1 7' lvl 1 7' lvl 1 7' lvl 1    Calf raises  2x10 2x10 3x10     Stand TKE    3x10, 3\" 3x10, 3\"    squats   2x10 3x10 3x10    Step up   2x10, 4\" 3x10, 4\" 2x10, 6\"    Knee flexion stret. On step    10x, 8\" step HEP    SLR flexion    2x6 PT assist 2x6 PT assist    Stand calf stretch     5x10''                                        Ther Activity         Pt ed  FB                Gait Training         stairs                       "

## 2025-07-14 ENCOUNTER — APPOINTMENT (OUTPATIENT)
Dept: RADIOLOGY | Facility: CLINIC | Age: 70
End: 2025-07-14
Attending: PHYSICIAN ASSISTANT
Payer: COMMERCIAL

## 2025-07-14 ENCOUNTER — OFFICE VISIT (OUTPATIENT)
Dept: OBGYN CLINIC | Facility: CLINIC | Age: 70
End: 2025-07-14

## 2025-07-14 DIAGNOSIS — Z96.651 AFTERCARE FOLLOWING RIGHT KNEE JOINT REPLACEMENT SURGERY: ICD-10-CM

## 2025-07-14 DIAGNOSIS — Z47.1 AFTERCARE FOLLOWING RIGHT KNEE JOINT REPLACEMENT SURGERY: ICD-10-CM

## 2025-07-14 DIAGNOSIS — Z96.651 S/P TOTAL KNEE REPLACEMENT USING CEMENT, RIGHT: ICD-10-CM

## 2025-07-14 DIAGNOSIS — Z96.651 S/P TOTAL KNEE REPLACEMENT USING CEMENT, RIGHT: Primary | ICD-10-CM

## 2025-07-14 PROCEDURE — 73562 X-RAY EXAM OF KNEE 3: CPT

## 2025-07-14 PROCEDURE — 99024 POSTOP FOLLOW-UP VISIT: CPT | Performed by: PHYSICIAN ASSISTANT

## 2025-07-14 RX ORDER — OXYCODONE HYDROCHLORIDE 5 MG/1
TABLET ORAL
Qty: 40 TABLET | Refills: 0 | Status: SHIPPED | OUTPATIENT
Start: 2025-07-14 | End: 2025-07-18 | Stop reason: SDUPTHER

## 2025-07-14 NOTE — PROGRESS NOTES
Name: Casey Mahcado      : 1955      MRN: [id number]  Encounter Provider: Russell Mathias PA-C  Encounter Date: 2025   Encounter department: [facility]  :  Assessment & Plan  S/P total knee replacement using cement, right    Orders:    XR knee 3 vw right non injury; Future    Aftercare following right knee joint replacement surgery              Casey Machado is a pleasant 70 y.o. female presenting today for follow-up 2 weeks after a robotic assisted cemented right total knee arthroplasty.  The prosthesis remains stable on imaging and exam.  Her incision is healing nicely with no sign of infection or dehiscence.  She may now get it wet in the shower, but should avoid direct scrubbing or saturation.  She will continue with aspirin and Protonix for DVT prophylaxis for the next 4 weeks.  We did discuss strategies to help reduce edema and help with sleeping.  She may continue with oxycodone and Tylenol for pain.  She will continue efforts at physical therapy and at home.  We will plan to see her back in 4 weeks for clinical reevaluation.  She expressed understanding all of her questions were addressed today    I have personally reviewed pertinent films in PACS of the updated imaging taken today of her right knee and compared them to previous postoperative films.  There is been no change in the cemented right total knee arthroplasty that is well aligned and well-fixed.  There is no signs of loosening, periprosthetic fracture, or other interval complication from previous films.      Subjective: 2 weeks status post robotic assisted cemented right total knee arthroplasty    History: Casey Machado is a 70 y.o. female presenting today for follow-up 2 weeks from surgery.  She reports that she is doing fairly well.  She has been taking the oxycodone and Tylenol for pain and oxycodone was refilled for this morning.  She has been compliant with the aspirin for DVT  "prophylaxis as well as Protonix.  She has been working with physical therapy and doing her home exercises as well    Estimated body mass index is 38.78 kg/m² as calculated from the following:    Height as of 6/30/25: 5' 3\" (1.6 m).    Weight as of 6/30/25: 99.3 kg (218 lb 14.7 oz).    Lab Results   Component Value Date    HGBA1C 5.7 (H) 06/12/2025       Social History     Occupational History    Not on file   Tobacco Use    Smoking status: Never     Passive exposure: Never    Smokeless tobacco: Never   Vaping Use    Vaping status: Never Used   Substance and Sexual Activity    Alcohol use: Yes     Comment: Socially    Drug use: No    Sexual activity: Not on file       Objective:  Right Knee Exam     Muscle Strength   The patient has normal right knee strength.    Tenderness   The patient is experiencing tenderness in the medial retinaculum and lateral retinaculum.    Range of Motion   Extension:  abnormal Right knee extension: 2.  Flexion:  abnormal Right knee flexion: 105.    Tests   Varus: negative Valgus: negative  Drawer:  Anterior - negative      Patellar apprehension: negative    Other   Erythema: absent  Scars: present  Sensation: normal  Pulse: present  Swelling: mild  Effusion: no effusion present    Comments:  Anterior incision well-approximated without erythema, warmth, drainage, or dehiscence.  No sign of infection  Prosthesis stable to varus and valgus stressing throughout available range of motion  Thigh and calf soft and nontender  Ambulates with an antalgic gait on the right with a walker  Grossly distally neurovascularly intact  Mild edema normal for this stage postoperatively          Observations     Right Knee   Negative for effusion.       There were no vitals filed for this visit.    Past Medical History[1]    Past Surgical History[2]    Family History[3]    Current Medications[4]    Allergies[5]      Review of Systems   Constitutional:  Positive for activity change.   HENT: Negative.     Eyes: " Negative.    Respiratory: Negative.     Cardiovascular:  Positive for leg swelling.   Gastrointestinal: Negative.    Endocrine: Negative.    Genitourinary: Negative.    Musculoskeletal:  Positive for arthralgias, gait problem, joint swelling and myalgias.   Skin: Negative.    Allergic/Immunologic: Negative.    Hematological: Negative.    Psychiatric/Behavioral: Negative.       Physical Exam  Vitals and nursing note reviewed.   Constitutional:       Appearance: Normal appearance. She is well-developed.      Comments: There is no height or weight on file to calculate BMI.   HENT:      Head: Normocephalic and atraumatic.      Right Ear: External ear normal.      Left Ear: External ear normal.     Eyes:      Extraocular Movements: Extraocular movements intact.      Conjunctiva/sclera: Conjunctivae normal.       Cardiovascular:      Rate and Rhythm: Normal rate.      Pulses: Normal pulses.   Pulmonary:      Effort: Pulmonary effort is normal.   Abdominal:      Palpations: Abdomen is soft.     Musculoskeletal:      Cervical back: Normal range of motion.      Right knee: No effusion.      Comments: See ortho exam     Skin:     General: Skin is warm and dry.     Neurological:      General: No focal deficit present.      Mental Status: She is alert and oriented to person, place, and time. Mental status is at baseline.     Psychiatric:         Mood and Affect: Mood normal.         Behavior: Behavior normal.         Thought Content: Thought content normal.         Judgment: Judgment normal.         This document was created using speech voice recognition software.   Grammatical errors, random word insertions, pronoun errors, and incomplete sentences are an occasional consequence of this system due to software limitations, ambient noise, and hardware issues.   Any formal questions or concerns about content, text, or information contained within the body of this dictation should be directly addressed to the provider for  "clarification.         [1]   Past Medical History:  Diagnosis Date    Acute non-recurrent maxillary sinusitis 01/15/2023    Allergic rhinitis     last assessed 14, resolved 12/22/15    Anxiety     Arthritis     resolved 12/22/15    Asthma     Asthmatic bronchitis without complication 2018    Added automatically from request for surgery [id number]    Bariatric surgery status     Cancer (HCC)     derma fibroid sarcoma protuberance    Depression     \"situational\"    Environmental and seasonal allergies     \"smoke,perfume and mold some of my triggers\"    GERD (gastroesophageal reflux disease)     Hiatal hernia     pt not aware of this issue    History of cellulitis     Hyperlipidemia     \"a little high\"    Hypertension     Benign essential     Joint stiffness of knee     Low back pain     Lumbar disc disease     Morbid obesity (HCC)     Osteoarthritis     Postgastrectomy malabsorption     Pre-operative laboratory examination     Right knee pain     Shortness of breath     \"occas \"    Use of cane as ambulatory aid     \"for long distances\"   [2]   Past Surgical History:  Procedure Laterality Date     SECTION      last assessed 14    MOHS SURGERY Right     upper arm    DE ARTHRP KNE CONDYLE&PLATU MEDIAL&LAT COMPARTMENTS Right 2025    Procedure: ARTHROPLASTY KNEE TOTAL W ROBOT - RIGHT - SAME DAY - CEMENTED;  Surgeon: Roman Rudd DO;  Location: [facility];  Service: Orthopedics    DE EGD TRANSORAL BIOPSY SINGLE/MULTIPLE N/A 2018    Procedure: ESOPHAGOGASTRODUODENOSCOPY (EGD) with bx;  Surgeon: Matty Munoz MD;  Location: [facility];  Service: Bariatrics    DE LAPS GSTR RSTCV PX W/BYP AMISH-EN-Y LIMB <150 CM N/A 2018    Procedure: LAPAROSCOPIC AMISH-EN-Y GASTRIC BYPASS AND INTRAOPERATIVE EGD;  Surgeon: Matty Munoz MD;  Location: [facility];  Service: Bariatrics   [3]   Family History  Problem Relation Name Age of Onset    Hypertension Mother      Heart disease Father      Cancer Neg " Hx      Diabetes Neg Hx      Thyroid disease Neg Hx     [4]   Current Outpatient Medications:     acetaminophen (TYLENOL) 325 mg tablet, Take 3 tablets (975 mg total) by mouth every 8 (eight) hours, Disp: , Rfl:     albuterol (PROVENTIL HFA,VENTOLIN HFA) 90 mcg/act inhaler, Inhale 2 puffs every 6 (six) hours as needed for wheezing or shortness of breath, Disp: 18 g, Rfl: 0    alendronate (Fosamax) 70 mg tablet, Take 1 tablet (70 mg total) by mouth every 7 days, Disp: 12 tablet, Rfl: 3    aspirin 81 mg chewable tablet, Chew 1 tablet (81 mg total) in the morning and 1 tablet (81 mg total) before bedtime., Disp: 84 tablet, Rfl: 0    azelastine (ASTELIN) 0.1 % nasal spray, 1 spray into each nostril 2 (two) times a day Use in each nostril as directed, Disp: 1 mL, Rfl: 2    bisoprolol (ZEBETA) 5 mg tablet, Take 1 tablet (5 mg total) by mouth in the morning., Disp: 90 tablet, Rfl: 1    docusate sodium (COLACE) 100 mg capsule, Take 1 capsule (100 mg total) by mouth 2 (two) times a day, Disp: 60 capsule, Rfl: 0    escitalopram (LEXAPRO) 5 mg tablet, TAKE ONE TABLET BY MOUTH EVERY DAY, Disp: 30 tablet, Rfl: 5    fluticasone (FLONASE) 50 mcg/act nasal spray, USE 1 SPRAY IN EACH NOSTRIL DAILY, Disp: 16 mL, Rfl: 2    hydrOXYzine HCL (ATARAX) 25 mg tablet, Take 1 tablet (25 mg total) by mouth daily at bedtime, Disp: 30 tablet, Rfl: 3    ipratropium-albuterol (DUO-NEB) 0.5-2.5 mg/3 mL nebulizer solution, Take 3 mL by nebulization every 6 (six) hours as needed for wheezing or shortness of breath, Disp: 60 mL, Rfl: 0    levocetirizine (XYZAL) 5 MG tablet, TAKE ONE TABLET BY MOUTH EVERY DAY DURING SEASONAL ALLERGY SEASON (GENERIC FOR XYZAL), Disp: 90 tablet, Rfl: 1    LORazepam (ATIVAN) 0.5 mg tablet, TAKE ONE TABLET BY MOUTH EVERY DAY AS NEEDED FOR ANXIETY, Disp: 30 tablet, Rfl: 0    losartan (COZAAR) 100 MG tablet, TAKE ONE TABLET BY MOUTH EVERY DAY, Disp: 90 tablet, Rfl: 1    Multiple Vitamins-Minerals (WOMENS MULTIVITAMIN PO),  Take 1 tablet by mouth daily at bedtime, Disp: , Rfl:     oxyCODONE (Roxicodone) 5 immediate release tablet, May take 1 tablet (5 mg total) by mouth every 6 (six) hours as needed for moderate pain or severe pain. May also take 2 tablets (10 mg total) every 6 (six) hours as needed for moderate pain or severe pain. Max Daily Amount: 60 mg., Disp: 40 tablet, Rfl: 0    pantoprazole (PROTONIX) 40 mg tablet, Take 1 tablet (40 mg total) by mouth daily, Disp: 42 tablet, Rfl: 0    [Paused] omeprazole (PriLOSEC) 20 mg delayed release capsule, Take 1 capsule (20 mg total) by mouth daily (Patient not taking: Reported on 7/14/2025), Disp: 100 capsule, Rfl: 1  [5] No Known Allergies

## 2025-07-15 ENCOUNTER — OFFICE VISIT (OUTPATIENT)
Dept: PHYSICAL THERAPY | Facility: CLINIC | Age: 70
End: 2025-07-15
Payer: COMMERCIAL

## 2025-07-15 DIAGNOSIS — G89.29 CHRONIC PAIN OF RIGHT KNEE: Primary | ICD-10-CM

## 2025-07-15 DIAGNOSIS — M25.561 CHRONIC PAIN OF RIGHT KNEE: Primary | ICD-10-CM

## 2025-07-15 PROCEDURE — 97110 THERAPEUTIC EXERCISES: CPT | Performed by: PHYSICAL THERAPIST

## 2025-07-17 ENCOUNTER — OFFICE VISIT (OUTPATIENT)
Dept: PHYSICAL THERAPY | Facility: CLINIC | Age: 70
End: 2025-07-17
Payer: COMMERCIAL

## 2025-07-17 DIAGNOSIS — G89.29 CHRONIC PAIN OF RIGHT KNEE: Primary | ICD-10-CM

## 2025-07-17 DIAGNOSIS — M25.561 CHRONIC PAIN OF RIGHT KNEE: Primary | ICD-10-CM

## 2025-07-17 PROCEDURE — 97110 THERAPEUTIC EXERCISES: CPT | Performed by: PHYSICAL THERAPIST

## 2025-07-17 NOTE — PROGRESS NOTES
"Daily Note     Today's date: 2025  Patient name: Casey Machado  : 1955  MRN: [id number]  Referring provider: Roman Rudd DO  Dx:   Encounter Diagnosis     ICD-10-CM    1. Chronic pain of right knee  M25.561     G89.29           Start Time: 1140  Stop Time: 1230  Total time in clinic (min): 50 minutes    Subjective: Patient reports her knee is sore and itchy entering treatment. Has been using cane at home for little distances and feels okay with this.     Objective: See treatment diary below    Assessment: Tolerated treatment well. Patient did better with manual intervention with stretching when in seated position. CCG needed with ambulation within clinic.    Plan: Continue per plan of care.        Insurance:  AMA/CMS Eval/ Re-eval POC expires Auth #/ Referral # Total    Start date  Expiration date Extension  Visit limitation?  PT only or  PT+OT? Co-Insurance   CMS 6.23 9.15 needed              16V 7.16 9.10         AUTH #:  Date               Authed: 16V Used 1               Remaining  15                Precautions: HTN, OP  Patient provided verbal consent to treatment plan and recommended interventions.  DOS: 25.    HEP: LAQ, SAQ, glute set, quad set, ankle pumps, heel slides    Manuals 7.8 7.10 7.11 7.15 7.17   visit 3 4 5 6 7   Knee flexion  102 AROM 99 AROM 101 AROM 102 AROM    Knee extn -2  -3 -2 -1            Neuro Re-Ed        SLS   5x10'' UE assist  5x10'' UE assist                           Ther Ex        Quad set 2x10, 3\" towel under knee, 2x10 without towel 2x10, 3\" towel under knee, 2x10 without towel 2x10, 3\" towel under knee, 2x10 without towel 3x10, 3\" no towel under knee 3x10, 3\" no towel under knee   LAQ 3x10 3x10 2x15 HEP    SAQ 3x10  3x10 3x10, 3\" 3x10, 3\"   Heel slides Peanut roll in 3x10 Peanut roll in 3x10 Peanut roll in 3x10  Peanut roll in 3x10, 3\" hold   Nu step 7' lvl 1 7' lvl 1 7' lvl 1 7' lvl 1 St 7 7' lvl 1 st 7   Stand TKE  3x10, 3\" 3x10, 3\" 2x10, 3\" 2x10, 3\" " "  squats 2x10 3x10 3x10 3x10 3x10   Step up 2x10, 4\" 3x10, 4\" 2x10, 6\" 3x10, 6\" 3x10, 6\"   Knee flexion stret. On step  10x, 8\" step HEP 2x10, 6\" step 2x10, 6\" step   SLR flexion  2x6 PT assist 2x6 PT assist 2x10 PT assist 3x8, no assist   Stand calf stretch   5x10''     PROM     Seated knee flexion   Seated hip flexion     3x10                     Ther Activity        Pt ed                Gait Training        SPC    5' 4'                  "

## 2025-07-18 ENCOUNTER — OFFICE VISIT (OUTPATIENT)
Dept: PHYSICAL THERAPY | Facility: CLINIC | Age: 70
End: 2025-07-18
Payer: COMMERCIAL

## 2025-07-18 DIAGNOSIS — G89.29 CHRONIC PAIN OF RIGHT KNEE: Primary | ICD-10-CM

## 2025-07-18 DIAGNOSIS — Z96.651 S/P TOTAL KNEE REPLACEMENT USING CEMENT, RIGHT: ICD-10-CM

## 2025-07-18 DIAGNOSIS — M25.561 CHRONIC PAIN OF RIGHT KNEE: Primary | ICD-10-CM

## 2025-07-18 PROCEDURE — 97110 THERAPEUTIC EXERCISES: CPT | Performed by: PHYSICAL THERAPIST

## 2025-07-18 RX ORDER — OXYCODONE HYDROCHLORIDE 5 MG/1
TABLET ORAL
Qty: 40 TABLET | Refills: 0 | Status: SHIPPED | OUTPATIENT
Start: 2025-07-19 | End: 2025-07-25 | Stop reason: SDUPTHER

## 2025-07-18 NOTE — PROGRESS NOTES
"Daily Note     Today's date: 2025  Patient name: Casey Machado  : 1955  MRN: [id number]  Referring provider: Roman Rudd DO  Dx:   Encounter Diagnosis     ICD-10-CM    1. Chronic pain of right knee  M25.561     G89.29         Start Time: 1140  Stop Time: 1230  Total time in clinic (min): 50 minutes  Subjective: Patient reports that she is not having as much itchiness but her low back continues to be aggravating limiting walking.     Objective: See treatment diary below    Assessment: Tolerated treatment well. Patient reported feeling more loose post session. Cueing for TKE with standing and ambulation.     Plan: Continue per plan of care.        Insurance:  AMA/CMS Eval/ Re-eval POC expires Auth #/ Referral # Total    Start date  Expiration date Extension  Visit limitation?  PT only or  PT+OT? Co-Insurance   CMS 6.23 9.15 needed 6V         CMS    16V 7.16 9.10         AUTH #:  Date 18             Authed: 16V Used 1 2              Remaining  15 14               Precautions: HTN, OP  Patient provided verbal consent to treatment plan and recommended interventions.  DOS: 25.    HEP: LAQ, SAQ, glute set, quad set, ankle pumps, heel slides    Manuals 7.10 7.11 7.15 7.17 7.18.25   visit 4 5 6 7 8   Knee flexion  99 AROM 101 AROM 102 AROM  104 ROM   Knee extn -3 -2 -1             Neuro Re-Ed        SLS  5x10'' UE assist  5x10'' UE assist                            Ther Ex        Quad set 2x10, 3\" towel under knee, 2x10 without towel 2x10, 3\" towel under knee, 2x10 without towel 3x10, 3\" no towel under knee 3x10, 3\" no towel under knee 2x15, 5\" hold   LAQ 3x10 2x15 HEP  2x15   Heel slides Peanut roll in 3x10 Peanut roll in 3x10  Peanut roll in 3x10, 3\" hold Peanut roll in 3x10, 3\" w/SOS   Nu step 7' lvl 1 7' lvl 1 7' lvl 1 St 7 7' lvl 1 st 7 7' lvl 1 st 7   Stand TKE 3x10, 3\" 3x10, 3\" 2x10, 3\" 2x10, 3\" 3x10, 3\" ball   squats 3x10 3x10 3x10 3x10 3x10   Step up 3x10, 4\" 2x10, 6\" 3x10, 6\" 3x10, 6\" " "1x5, 8\" for assessment   Knee flexion stret. On step 10x, 8\" step HEP 2x10, 6\" step 2x10, 6\" step 3x10, 8\" step   SLR flexion 2x6 PT assist 2x6 PT assist 2x10 PT assist 3x8, no assist 3x8, no assist   Heel raise     2x10   PROM    Seated knee flexion Seated knee flexion   Seated hip flexion    3x10 3x10                     Ther Activity        Pt ed                Gait Training        SPC   5' 4'                   "

## 2025-07-22 ENCOUNTER — OFFICE VISIT (OUTPATIENT)
Dept: PHYSICAL THERAPY | Facility: CLINIC | Age: 70
End: 2025-07-22
Payer: COMMERCIAL

## 2025-07-22 DIAGNOSIS — G89.29 CHRONIC PAIN OF RIGHT KNEE: Primary | ICD-10-CM

## 2025-07-22 DIAGNOSIS — M25.561 CHRONIC PAIN OF RIGHT KNEE: Primary | ICD-10-CM

## 2025-07-22 PROCEDURE — 97110 THERAPEUTIC EXERCISES: CPT | Performed by: PHYSICAL THERAPIST

## 2025-07-22 NOTE — PROGRESS NOTES
"Daily Note     Today's date: 2025  Patient name: Casey Machado  : 1955  MRN: [id number]  Referring provider: Roman Rudd DO  Dx:   Encounter Diagnosis     ICD-10-CM    1. Chronic pain of right knee  M25.561     G89.29         Start Time: 1140  Stop Time: 1225  Total time in clinic (min): 45 minutes  Subjective: Patient reports doing well with regards to her knee but still is itchy. Also reporting low back pain impacting her walking.    Objective: See treatment diary below    Assessment: Tolerated treatment well. Cueing for TKE in standing and with ambulation with SPC around clinic. Discomfort reported when trying to perform PROM in supine and better tolerated in seated position.     Plan: Continue per plan of care.        Insurance:  AMA/CMS Eval/ Re-eval POC expires Auth #/ Referral # Total    Start date  Expiration date Extension  Visit limitation?  PT only or  PT+OT? Co-Insurance   CMS 6.23 9.15 needed 6V         CMS    16V 7.16 9.10         AUTH #:  Date 717 7.18 7.22            Authed: 16V Used 1 2 3             Remaining  15 14 13              Precautions: HTN, OP  Patient provided verbal consent to treatment plan and recommended interventions.  DOS: 25.    HEP: LAQ, SAQ, glute set, quad set, ankle pumps, heel slides    Manuals 7.11 7.15 7.17 7.18.25 7.22.25   visit 5 6 7 8 9   Knee flexion  101 AROM 102 AROM  104 ROM    Knee extn -2 -1              Neuro Re-Ed        SLS 5x10'' UE assist  5x10'' UE assist                             Ther Ex        Quad set 2x10, 3\" towel under knee, 2x10 without towel 3x10, 3\" no towel under knee 3x10, 3\" no towel under knee 2x15, 5\" hold 2x15, 3\" hold   LAQ 2x15 HEP  2x15 HEP   Heel slides Peanut roll in 3x10  Peanut roll in 3x10, 3\" hold Peanut roll in 3x10, 3\" w/SOS Peanut roll in 3x12, 3\" w/SOS   Nu step 7' lvl 1 7' lvl 1 St 7 7' lvl 1 st 7 7' lvl 1 st 7    Stand TKE 3x10, 3\" 2x10, 3\" 2x10, 3\" 3x10, 3\" ball 3x10, 3\" ball   squats 3x10 3x10 3x10 3x10 " "3x10   Step up 2x10, 6\" 3x10, 6\" 3x10, 6\" 1x5, 8\" for assessment 3x10, 8\"    Knee flexion stretch On step HEP 2x10, 6\" step 2x10, 6\" step 3x10, 8\" step    SLR flexion 2x6 PT assist 2x10 PT assist 3x8, no assist 3x8, no assist 3x8-10   Heel raise    2x10    PROM   Seated knee flexion Seated knee flexion Knee flexion in sitting   Seated hip flexion   3x10 3x10 3x10                     Ther Activity        Pt ed                Gait Training        SPC  5' 4'                    "

## 2025-07-24 ENCOUNTER — OFFICE VISIT (OUTPATIENT)
Dept: PHYSICAL THERAPY | Facility: CLINIC | Age: 70
End: 2025-07-24
Payer: COMMERCIAL

## 2025-07-24 DIAGNOSIS — M25.561 CHRONIC PAIN OF RIGHT KNEE: Primary | ICD-10-CM

## 2025-07-24 DIAGNOSIS — G89.29 CHRONIC PAIN OF RIGHT KNEE: Primary | ICD-10-CM

## 2025-07-24 PROCEDURE — 97110 THERAPEUTIC EXERCISES: CPT

## 2025-07-24 NOTE — PROGRESS NOTES
"Daily Note     Today's date: 2025  Patient name: Casey Machado  : 1955  MRN: [id number]  Referring provider: Roman Rudd DO  Dx:   Encounter Diagnosis     ICD-10-CM    1. Chronic pain of right knee  M25.561     G89.29                      Subjective: Patient reports her knee is more sore today because of increased activity level yesterday. Patient reports a \"pinch\" in her low back today which she thinks is from sleeping in the recliner.       Objective: See treatment diary below      Assessment: Tolerated treatment well. Patient given repeated lumbar extension with posterior table support to address reported back symptoms.Patient reported one instances of sharp pain during step ups but patient reported it only occurred once. Continue to progress patient as tolerated.  Patient demonstrated fatigue post treatment, exhibited good technique with therapeutic exercises, and would benefit from continued PT      Plan: Continue per plan of care.         Insurance:  AMA/CMS Eval/ Re-eval POC expires Auth #/ Referral # Total    Start date  Expiration date Extension  Visit limitation?  PT only or  PT+OT? Co-Insurance   CMS 6.23 9.15 needed 6V         CMS    16V 7.16 9.10         AUTH #:  Date 717 7.18 7.22            Authed: 16V Used 1 2 3             Remaining  15 14 13              Precautions: HTN, OP  Patient provided verbal consent to treatment plan and recommended interventions.  DOS: 25.    HEP: LAQ, SAQ, glute set, quad set, ankle pumps, heel slides    Manuals 7.11 7.15 7.17 7.18.25 7.22.25 25   visit 5 6 7 8 9 10   Knee flexion  101 AROM 102 AROM  104 ROM  103    Knee extn -2 -1                Neuro Re-Ed         SLS 5x10'' UE assist  5x10'' UE assist   Sls                               Ther Ex         Quad set 2x10, 3\" towel under knee, 2x10 without towel 3x10, 3\" no towel under knee 3x10, 3\" no towel under knee 2x15, 5\" hold 2x15, 3\" hold 2x15 5s hold    LAQ 2x15 HEP  2x15 HEP    Heel " "slides Peanut roll in 3x10  Peanut roll in 3x10, 3\" hold Peanut roll in 3x10, 3\" w/SOS Peanut roll in 3x12, 3\" w/SOS Peanut roll in 3x12, 3\" w/SOS   Nu step 7' lvl 1 7' lvl 1 St 7 7' lvl 1 st 7 7' lvl 1 st 7  6 min s7 lvl 1    Stand TKE 3x10, 3\" 2x10, 3\" 2x10, 3\" 3x10, 3\" ball 3x10, 3\" ball 3x10 3s hold    squats 3x10 3x10 3x10 3x10 3x10 3x10    Sit to stands      3x10 on one airex    Step up 2x10, 6\" 3x10, 6\" 3x10, 6\" 1x5, 8\" for assessment 3x10, 8\"  3x10 8\"   Knee flexion stretch On step HEP 2x10, 6\" step 2x10, 6\" step 3x10, 8\" step  3x10 8 inch     Knee flexion step stretch 20x   SLR flexion 2x6 PT assist 2x10 PT assist 3x8, no assist 3x8, no assist 3x8-10 3x10    Heel raise    2x10     PROM   Seated knee flexion Seated knee flexion Knee flexion in sitting Knee flexion in sitting    Seated hip flexion   3x10 3x10 3x10    Repeated lumbar ext      2x10               Ther Activity         Pt ed                  Gait Training         SPC  5' 4'                        "

## 2025-07-25 ENCOUNTER — OFFICE VISIT (OUTPATIENT)
Dept: PHYSICAL THERAPY | Facility: CLINIC | Age: 70
End: 2025-07-25
Payer: COMMERCIAL

## 2025-07-25 DIAGNOSIS — G89.29 CHRONIC PAIN OF RIGHT KNEE: Primary | ICD-10-CM

## 2025-07-25 DIAGNOSIS — M25.561 CHRONIC PAIN OF RIGHT KNEE: Primary | ICD-10-CM

## 2025-07-25 DIAGNOSIS — Z96.651 S/P TOTAL KNEE REPLACEMENT USING CEMENT, RIGHT: ICD-10-CM

## 2025-07-25 PROCEDURE — 97110 THERAPEUTIC EXERCISES: CPT | Performed by: PHYSICAL THERAPIST

## 2025-07-25 RX ORDER — OXYCODONE HYDROCHLORIDE 5 MG/1
5 TABLET ORAL EVERY 6 HOURS PRN
Qty: 25 TABLET | Refills: 0 | Status: SHIPPED | OUTPATIENT
Start: 2025-07-25

## 2025-07-25 NOTE — PROGRESS NOTES
"Daily Note     Today's date: 2025  Patient name: Casey Machado  : 1955  MRN: [id number]  Referring provider: Roman Rudd DO  Dx:   Encounter Diagnosis     ICD-10-CM    1. Chronic pain of right knee  M25.561     G89.29       Start Time: 1140  Stop Time: 1230  Total time in clinic (min): 50 minutes    Subjective: Patient reports that she did not sleep well last night and is itchy today around her knee. Entering clinic with SPC.     Objective: See treatment diary below    Assessment: Tolerated treatment well. Patient did well with ambulation around clinic today with SPC. Improved knee ROM compared to prior session.       Plan: Continue per plan of care.         Insurance:  AMA/CMS Eval/ Re-eval POC expires Auth #/ Referral # Total    Start date  Expiration date Extension  Visit limitation?  PT only or  PT+OT? Co-Insurance   CMS 6.23 9.15 needed 6V         CMS    16V 7.16 9.10         AUTH #:  Date 7.17 7.18 7.22 7.24 7.25          Authed: 16V Used 1 2 3 4 5           Remaining  15 14 13 12 11            Precautions: HTN, OP  Patient provided verbal consent to treatment plan and recommended interventions.  DOS: 25.    HEP: LAQ, SAQ, glute set, quad set, ankle pumps, heel slides    Manuals 7.17 7.18.25 7.22.25 25   visit 7 8 9 10 11   Knee flexion   104 ROM  103  106   Knee extn                Neuro Re-Ed        SLS 5x10'' UE assist   Sls                             Ther Ex        Heel slides Peanut roll in 3x10, 3\" hold Peanut roll in 3x10, 3\" w/SOS Peanut roll in 3x12, 3\" w/SOS Peanut roll in 3x12, 3\" w/SOS Peanut roll in 3x12, 3\" w/SOS   Nu step 7' lvl 1 st 7 7' lvl 1 st 7  6 min s7 lvl 1  8' lvl 2 seat 7   Stand TKE 2x10, 3\" 3x10, 3\" ball 3x10, 3\" ball 3x10 3s hold     squats 3x10 3x10 3x10 3x10  3x10   Sit to stands    3x10 on one airex  3x10 on one airex   Step up 3x10, 6\" 1x5, 8\" for assessment 3x10, 8\"  3x10 8\" 3x10, 8\"   Knee flexion stretch On step 2x10, 6\" step 3x10, 8\" step  " 3x10 8 inch     Knee flexion step stretch 20x 3x10 8 inch     Knee flexion step stretch 20x   SLR flexion 3x8, no assist 3x8, no assist 3x8-10 3x10     Heel raise  2x10      PROM Seated knee flexion Seated knee flexion Knee flexion in sitting Knee flexion in sitting     Seated hip flexion 3x10 3x10 3x10     Repeated lumbar ext    2x10               Ther Activity        Pt ed                Gait Training        SPC 4'

## 2025-07-29 ENCOUNTER — OFFICE VISIT (OUTPATIENT)
Dept: PHYSICAL THERAPY | Facility: CLINIC | Age: 70
End: 2025-07-29
Payer: COMMERCIAL

## 2025-07-29 DIAGNOSIS — M25.561 CHRONIC PAIN OF RIGHT KNEE: Primary | ICD-10-CM

## 2025-07-29 DIAGNOSIS — G89.29 CHRONIC PAIN OF RIGHT KNEE: Primary | ICD-10-CM

## 2025-07-29 PROCEDURE — 97110 THERAPEUTIC EXERCISES: CPT | Performed by: PHYSICAL THERAPIST

## 2025-07-31 ENCOUNTER — OFFICE VISIT (OUTPATIENT)
Dept: PHYSICAL THERAPY | Facility: CLINIC | Age: 70
End: 2025-07-31
Payer: COMMERCIAL

## 2025-07-31 DIAGNOSIS — G89.29 CHRONIC PAIN OF RIGHT KNEE: Primary | ICD-10-CM

## 2025-07-31 DIAGNOSIS — M25.561 CHRONIC PAIN OF RIGHT KNEE: Primary | ICD-10-CM

## 2025-07-31 PROCEDURE — 97110 THERAPEUTIC EXERCISES: CPT | Performed by: PHYSICAL THERAPIST

## 2025-08-05 ENCOUNTER — OFFICE VISIT (OUTPATIENT)
Dept: PHYSICAL THERAPY | Facility: CLINIC | Age: 70
End: 2025-08-05
Payer: COMMERCIAL

## 2025-08-05 DIAGNOSIS — Z96.651 S/P TOTAL KNEE REPLACEMENT USING CEMENT, RIGHT: ICD-10-CM

## 2025-08-05 DIAGNOSIS — M25.561 CHRONIC PAIN OF RIGHT KNEE: Primary | ICD-10-CM

## 2025-08-05 DIAGNOSIS — G89.29 CHRONIC PAIN OF RIGHT KNEE: Primary | ICD-10-CM

## 2025-08-05 PROCEDURE — 97110 THERAPEUTIC EXERCISES: CPT | Performed by: PHYSICAL THERAPIST

## 2025-08-05 RX ORDER — OXYCODONE HYDROCHLORIDE 5 MG/1
5 TABLET ORAL EVERY 6 HOURS PRN
Qty: 25 TABLET | Refills: 0 | Status: SHIPPED | OUTPATIENT
Start: 2025-08-05 | End: 2025-08-11 | Stop reason: SDUPTHER

## 2025-08-07 ENCOUNTER — OFFICE VISIT (OUTPATIENT)
Dept: PHYSICAL THERAPY | Facility: CLINIC | Age: 70
End: 2025-08-07
Payer: COMMERCIAL

## 2025-08-07 DIAGNOSIS — G89.29 CHRONIC PAIN OF RIGHT KNEE: Primary | ICD-10-CM

## 2025-08-07 DIAGNOSIS — M25.561 CHRONIC PAIN OF RIGHT KNEE: Primary | ICD-10-CM

## 2025-08-07 PROCEDURE — 97110 THERAPEUTIC EXERCISES: CPT | Performed by: PHYSICAL THERAPIST

## 2025-08-12 ENCOUNTER — OFFICE VISIT (OUTPATIENT)
Dept: PHYSICAL THERAPY | Facility: CLINIC | Age: 70
End: 2025-08-12
Payer: COMMERCIAL

## 2025-08-13 ENCOUNTER — OFFICE VISIT (OUTPATIENT)
Dept: OBGYN CLINIC | Facility: CLINIC | Age: 70
End: 2025-08-13

## 2025-08-14 ENCOUNTER — OFFICE VISIT (OUTPATIENT)
Dept: PHYSICAL THERAPY | Facility: CLINIC | Age: 70
End: 2025-08-14
Payer: COMMERCIAL

## 2025-08-21 ENCOUNTER — OFFICE VISIT (OUTPATIENT)
Dept: PHYSICAL THERAPY | Facility: CLINIC | Age: 70
End: 2025-08-21
Payer: COMMERCIAL

## 2025-08-21 DIAGNOSIS — G89.29 CHRONIC PAIN OF RIGHT KNEE: Primary | ICD-10-CM

## 2025-08-21 DIAGNOSIS — M25.561 CHRONIC PAIN OF RIGHT KNEE: Primary | ICD-10-CM

## 2025-08-21 PROCEDURE — 97110 THERAPEUTIC EXERCISES: CPT | Performed by: PHYSICAL THERAPIST

## (undated) DEVICE — WEBRIL 6 IN UNSTERILE

## (undated) DEVICE — ELECTRODE LAP SPATULA STR E-Z CLEAN 33CM -0018

## (undated) DEVICE — SKIN MARKER DUAL TIP WITH RULER CAP, FLEXIBLE RULER AND LABELS: Brand: DEVON

## (undated) DEVICE — CHLORAPREP HI-LITE 26ML ORANGE

## (undated) DEVICE — DISPOSABLE EQUIPMENT COVER: Brand: LARGE TOWEL DRAPE

## (undated) DEVICE — TIBURON LAPAROSCOPIC ABDOMINAL DRAPE: Brand: CONVERTORS

## (undated) DEVICE — COVIDIEN ENDO GIA PURPLE (MED) RELOAD 60MM

## (undated) DEVICE — Device

## (undated) DEVICE — SINGLE-USE BIOPSY FORCEPS: Brand: RADIAL JAW 4

## (undated) DEVICE — BAG WOUND LAVAGE 1L BIASURGE ADV

## (undated) DEVICE — STAPLER ENDO GIA ROTICULATOR 60-2.5

## (undated) DEVICE — ENDOPATH XCEL BLADELESS TROCARS WITH STABILITY SLEEVES: Brand: ENDOPATH XCEL

## (undated) DEVICE — ADHESIVE SKIN HIGH VISCOSITY EXOFIN 1ML

## (undated) DEVICE — SCD SEQUENTIAL COMPRESSION COMFORT SLEEVE MEDIUM KNEE LENGTH: Brand: KENDALL SCD

## (undated) DEVICE — ENDOPATH 5MM CURVED SCISSORS WITH MONOPOLAR CAUTERY: Brand: ENDOPATH

## (undated) DEVICE — 2000CC GUARDIAN II: Brand: GUARDIAN

## (undated) DEVICE — BETHLEHEM UNIVERSAL MINOR GEN: Brand: CARDINAL HEALTH

## (undated) DEVICE — 3000CC GUARDIAN II: Brand: GUARDIAN

## (undated) DEVICE — GLOVE SRG BIOGEL 8

## (undated) DEVICE — ABSORBABLE WOUND CLOSURE DEVICE: Brand: V-LOC 90

## (undated) DEVICE — GLOVE SRG BIOGEL 7.5

## (undated) DEVICE — DUAL CUT SAGITTAL BLADE

## (undated) DEVICE — SURGICAL GOWN, XL SMARTSLEEVE: Brand: CONVERTORS

## (undated) DEVICE — SUT ETHIBOND 2 V-37 30 IN MX69GA

## (undated) DEVICE — 3M™ IOBAN™ 2 ANTIMICROBIAL INCISE DRAPE 6651EZ: Brand: IOBAN™ 2

## (undated) DEVICE — URETERAL CATHETER ADAPTOR TIP

## (undated) DEVICE — HARMONIC ACE +7 LAPAROSCOPIC SHEARS ADVANCED HEMOSTASIS 5MM DIAMETER 36CM SHAFT LENGTH  FOR USE WITH GRAY HAND PIECE ONLY: Brand: HARMONIC ACE

## (undated) DEVICE — GAUZE SPONGES,16 PLY: Brand: CURITY

## (undated) DEVICE — SEAMGUARD STPL REINF ENDO GIA ULTRA UNIV 60 PURPLE

## (undated) DEVICE — VELYS ROBOTIC-ASSISTED SOLUTION ARRAY SET - KNEE: Brand: VELYS

## (undated) DEVICE — VIOLET BRAIDED (POLYGLACTIN 910), SYNTHETIC ABSORBABLE SUTURE: Brand: COATED VICRYL

## (undated) DEVICE — BANDAGE, ESMARK LF STR 6"X9' (20/CS): Brand: CYPRESS

## (undated) DEVICE — DRESSING MEPILEX AG BORDER 4 X 12 IN

## (undated) DEVICE — TUBING SUCTION 5MM X 12 FT

## (undated) DEVICE — ENDOPATH PNEUMONEEDLE INSUFFLATION NEEDLES WITH LUER LOCK CONNECTORS 120MM: Brand: ENDOPATH

## (undated) DEVICE — 3M™ STERI-DRAPE™ U-DRAPE 1015: Brand: STERI-DRAPE™

## (undated) DEVICE — STR UTILITY DRAPE PK, 4 PK: Brand: CARDINAL HEALTH

## (undated) DEVICE — NEPTUNE E-SEP SMOKE EVACUATION PENCIL, COATED, 70MM BLADE, PUSH BUTTON SWITCH: Brand: NEPTUNE E-SEP

## (undated) DEVICE — DRAPE TOWEL: Brand: CONVERTORS

## (undated) DEVICE — BLUE HEAT SCOPE WARMER

## (undated) DEVICE — 3 BONE CEMENT MIXER: Brand: MIXEVAC

## (undated) DEVICE — SUT MONOCRYL 4-0 PS-2 27 IN Y426H

## (undated) DEVICE — TIBURON EXTREMITY SHEET: Brand: CONVERTORS

## (undated) DEVICE — INTRODUCER ANAL ABDOM EEA25 DISP STRL

## (undated) DEVICE — PENCIL ELECTROSURG E-Z CLEAN -0035H

## (undated) DEVICE — ENDOPATH XCEL UNIVERSAL TROCAR STABLILITY SLEEVES: Brand: ENDOPATH XCEL

## (undated) DEVICE — STAPLER GIA HANDLE STAND 4MM

## (undated) DEVICE — SYRINGE 30ML LL

## (undated) DEVICE — PMI DISPOSABLE PUNCTURE CLOSURE DEVICE / SUTURE GRASPER: Brand: PMI

## (undated) DEVICE — TRAVELKIT CONTAINS FIRST STEP KIT (200ML EP-4 KIT) AND SOILED SCOPE BAG - 1 KIT: Brand: TRAVELKIT CONTAINS FIRST STEP KIT AND SOILED SCOPE BAG

## (undated) DEVICE — STAPLER EEA 25 MM COVIDIEN

## (undated) DEVICE — VELYS ROBOT DRAPE

## (undated) DEVICE — INTENDED FOR TISSUE SEPARATION, AND OTHER PROCEDURES THAT REQUIRE A SHARP SURGICAL BLADE TO PUNCTURE OR CUT.: Brand: BARD-PARKER SAFETY BLADES SIZE 15, STERILE

## (undated) DEVICE — IRRIG ENDO FLO TUBING

## (undated) DEVICE — VELYS SATEL DRAPE

## (undated) DEVICE — REPEL CUT REST SURGICAL GLV LNRS LG: Brand: REPEL

## (undated) DEVICE — HANDPIECE SET WITH HIGH FLOW TIP AND SUCTION TUBE: Brand: INTERPULSE

## (undated) DEVICE — ANTIBACTERIAL UNDYED BRAIDED (POLYGLACTIN 910), SYNTHETIC ABSORBABLE SUTURE: Brand: COATED VICRYL

## (undated) DEVICE — STERILE DOUBLE BASIN SET PACK: Brand: CARDINAL HEALTH

## (undated) DEVICE — NEEDLE SPINAL 22G X 3.5IN  QUINCKE

## (undated) DEVICE — SUT STRATAFIX SPIRAL 3-0 PGA/PCL 30 X 30 CM SXMD2B410

## (undated) DEVICE — VELYS ROBOT-ASSISTED SOLUTION ARRAY DRILL PIN 125 MM X 4 MM: Brand: VELYS

## (undated) DEVICE — Device: Brand: DEFENDO AIR/WATER/SUCTION AND BIOPSY VALVE

## (undated) DEVICE — 10 MM BABCOCKS WITH RATCHET HANDLES: Brand: ENDOPATH

## (undated) DEVICE — HOOD: Brand: FLYTE, SURGICOOL

## (undated) DEVICE — INSTRUMENT POUCH: Brand: CONVERTORS

## (undated) DEVICE — VELYS ROBOT-ASSISTED SOLUTION ARRAY DRILL PIN 100 MM X 4 MM: Brand: VELYS

## (undated) DEVICE — [HIGH FLOW INSUFFLATOR,  DO NOT USE IF PACKAGE IS DAMAGED,  KEEP DRY,  KEEP AWAY FROM SUNLIGHT,  PROTECT FROM HEAT AND RADIOACTIVE SOURCES.]: Brand: PNEUMOSURE

## (undated) DEVICE — SUT STRATFIX SPIRAL PDS PLUS 1 CT-1/CT-1 12IN SXPP2B403

## (undated) DEVICE — INTENDED FOR TISSUE SEPARATION, AND OTHER PROCEDURES THAT REQUIRE A SHARP SURGICAL BLADE TO PUNCTURE OR CUT.: Brand: BARD-PARKER ® CARBON RIB-BACK BLADES

## (undated) DEVICE — ORTHOPEDIC PACK: Brand: CARDINAL HEALTH

## (undated) DEVICE — SUT ETHIBOND 0 CT-1 30 IN X424H

## (undated) DEVICE — TUBING SMOKE EVAC W/FILTRATION DEVICE PLUMEPORT ACTIV

## (undated) DEVICE — ADHESIVE SKIN CLSR DERMABOND NX

## (undated) DEVICE — ENDOPOUCH RETRIEVER SPECIMEN RETRIEVAL BAGS: Brand: ENDOPOUCH RETRIEVER

## (undated) DEVICE — ANTI-FOG SOLUTION WITH FOAM PAD: Brand: DEVON

## (undated) DEVICE — ASTOUND SURGICAL GOWN, XXX LARGE, X-LONG: Brand: CONVERTORS